# Patient Record
Sex: FEMALE | Race: WHITE | NOT HISPANIC OR LATINO | URBAN - METROPOLITAN AREA
[De-identification: names, ages, dates, MRNs, and addresses within clinical notes are randomized per-mention and may not be internally consistent; named-entity substitution may affect disease eponyms.]

---

## 2017-03-06 ENCOUNTER — OFFICE (AMBULATORY)
Dept: URBAN - METROPOLITAN AREA CLINIC 64 | Facility: CLINIC | Age: 77
End: 2017-03-06

## 2017-03-06 VITALS
HEART RATE: 57 BPM | WEIGHT: 145 LBS | DIASTOLIC BLOOD PRESSURE: 84 MMHG | SYSTOLIC BLOOD PRESSURE: 153 MMHG | HEIGHT: 61 IN

## 2017-03-06 DIAGNOSIS — K21.9 GASTRO-ESOPHAGEAL REFLUX DISEASE WITHOUT ESOPHAGITIS: ICD-10-CM

## 2017-03-06 DIAGNOSIS — F45.8 OTHER SOMATOFORM DISORDERS: ICD-10-CM

## 2017-03-06 PROCEDURE — 99213 OFFICE O/P EST LOW 20 MIN: CPT | Performed by: INTERNAL MEDICINE

## 2017-03-06 RX ORDER — SUCRALFATE 1 G/1
TABLET ORAL
Qty: 90 | Refills: 1 | Status: COMPLETED
End: 2017-03-06

## 2017-03-21 ENCOUNTER — ON CAMPUS - OUTPATIENT (AMBULATORY)
Dept: URBAN - METROPOLITAN AREA HOSPITAL 2 | Facility: HOSPITAL | Age: 77
End: 2017-03-21

## 2017-03-21 VITALS
RESPIRATION RATE: 16 BRPM | DIASTOLIC BLOOD PRESSURE: 59 MMHG | DIASTOLIC BLOOD PRESSURE: 48 MMHG | HEART RATE: 65 BPM | OXYGEN SATURATION: 95 % | DIASTOLIC BLOOD PRESSURE: 60 MMHG | WEIGHT: 138 LBS | TEMPERATURE: 98.3 F | DIASTOLIC BLOOD PRESSURE: 54 MMHG | OXYGEN SATURATION: 94 % | OXYGEN SATURATION: 99 % | OXYGEN SATURATION: 100 % | RESPIRATION RATE: 18 BRPM | HEART RATE: 49 BPM | HEART RATE: 61 BPM | SYSTOLIC BLOOD PRESSURE: 125 MMHG | OXYGEN SATURATION: 97 % | SYSTOLIC BLOOD PRESSURE: 140 MMHG | DIASTOLIC BLOOD PRESSURE: 79 MMHG | SYSTOLIC BLOOD PRESSURE: 122 MMHG | HEART RATE: 58 BPM | SYSTOLIC BLOOD PRESSURE: 139 MMHG | HEIGHT: 59 IN | SYSTOLIC BLOOD PRESSURE: 130 MMHG | DIASTOLIC BLOOD PRESSURE: 63 MMHG | SYSTOLIC BLOOD PRESSURE: 120 MMHG

## 2017-03-21 DIAGNOSIS — F45.8 OTHER SOMATOFORM DISORDERS: ICD-10-CM

## 2017-03-21 DIAGNOSIS — K22.2 ESOPHAGEAL OBSTRUCTION: ICD-10-CM

## 2017-03-21 DIAGNOSIS — K44.9 DIAPHRAGMATIC HERNIA WITHOUT OBSTRUCTION OR GANGRENE: ICD-10-CM

## 2017-03-21 DIAGNOSIS — K31.89 OTHER DISEASES OF STOMACH AND DUODENUM: ICD-10-CM

## 2017-03-21 DIAGNOSIS — K20.9 ESOPHAGITIS, UNSPECIFIED: ICD-10-CM

## 2017-03-21 DIAGNOSIS — K21.9 GASTRO-ESOPHAGEAL REFLUX DISEASE WITHOUT ESOPHAGITIS: ICD-10-CM

## 2017-03-21 PROCEDURE — 43249 ESOPH EGD DILATION <30 MM: CPT | Performed by: INTERNAL MEDICINE

## 2017-03-21 RX ORDER — PANTOPRAZOLE SODIUM 40 MG/1
TABLET, DELAYED RELEASE ORAL
Qty: 30 | Refills: 11 | Status: COMPLETED
End: 2017-09-18

## 2017-03-21 RX ADMIN — PROPOFOL: 10 INJECTION, EMULSION INTRAVENOUS at 13:02

## 2017-03-23 ENCOUNTER — HOSPITAL ENCOUNTER (OUTPATIENT)
Dept: GENERAL RADIOLOGY | Facility: HOSPITAL | Age: 77
Discharge: HOME | End: 2017-03-23
Attending: INTERNAL MEDICINE | Admitting: INTERNAL MEDICINE

## 2017-03-27 ENCOUNTER — HOSPITAL ENCOUNTER (OUTPATIENT)
Dept: GENERAL RADIOLOGY | Facility: HOSPITAL | Age: 77
Discharge: HOME | End: 2017-03-27
Attending: INTERNAL MEDICINE | Admitting: INTERNAL MEDICINE

## 2017-05-02 ENCOUNTER — HOSPITAL ENCOUNTER (OUTPATIENT)
Dept: GENERAL RADIOLOGY | Facility: HOSPITAL | Age: 77
Discharge: HOME | End: 2017-05-02
Attending: INTERNAL MEDICINE | Admitting: INTERNAL MEDICINE

## 2017-06-12 ENCOUNTER — OFFICE (AMBULATORY)
Dept: URBAN - METROPOLITAN AREA CLINIC 64 | Facility: CLINIC | Age: 77
End: 2017-06-12

## 2017-06-12 VITALS
DIASTOLIC BLOOD PRESSURE: 81 MMHG | HEIGHT: 59 IN | SYSTOLIC BLOOD PRESSURE: 155 MMHG | HEART RATE: 56 BPM | WEIGHT: 145 LBS

## 2017-06-12 DIAGNOSIS — K44.9 DIAPHRAGMATIC HERNIA WITHOUT OBSTRUCTION OR GANGRENE: ICD-10-CM

## 2017-06-12 DIAGNOSIS — K59.00 CONSTIPATION, UNSPECIFIED: ICD-10-CM

## 2017-06-12 PROCEDURE — 99214 OFFICE O/P EST MOD 30 MIN: CPT | Performed by: INTERNAL MEDICINE

## 2017-07-25 ENCOUNTER — OFFICE (AMBULATORY)
Dept: URBAN - METROPOLITAN AREA CLINIC 64 | Facility: CLINIC | Age: 77
End: 2017-07-25

## 2017-07-25 VITALS
WEIGHT: 138 LBS | HEIGHT: 59 IN | SYSTOLIC BLOOD PRESSURE: 151 MMHG | HEART RATE: 59 BPM | DIASTOLIC BLOOD PRESSURE: 77 MMHG

## 2017-07-25 DIAGNOSIS — K31.84 GASTROPARESIS: ICD-10-CM

## 2017-07-25 DIAGNOSIS — K22.0 ACHALASIA OF CARDIA: ICD-10-CM

## 2017-07-25 PROCEDURE — 99213 OFFICE O/P EST LOW 20 MIN: CPT | Performed by: NURSE PRACTITIONER

## 2017-08-17 ENCOUNTER — OFFICE VISIT (OUTPATIENT)
Dept: GASTROENTEROLOGY | Facility: CLINIC | Age: 77
End: 2017-08-17

## 2017-08-17 VITALS
BODY MASS INDEX: 27.5 KG/M2 | WEIGHT: 136.4 LBS | HEIGHT: 59 IN | DIASTOLIC BLOOD PRESSURE: 80 MMHG | SYSTOLIC BLOOD PRESSURE: 126 MMHG | TEMPERATURE: 98.7 F

## 2017-08-17 DIAGNOSIS — R68.81 EARLY SATIETY: ICD-10-CM

## 2017-08-17 DIAGNOSIS — R10.13 DYSPEPSIA: Primary | ICD-10-CM

## 2017-08-17 PROCEDURE — 99203 OFFICE O/P NEW LOW 30 MIN: CPT | Performed by: INTERNAL MEDICINE

## 2017-08-17 RX ORDER — AMLODIPINE BESYLATE 5 MG/1
TABLET ORAL
Refills: 10 | COMMUNITY
Start: 2017-08-15

## 2017-08-17 RX ORDER — ESCITALOPRAM OXALATE 10 MG/1
TABLET ORAL
Refills: 3 | COMMUNITY
Start: 2017-08-03 | End: 2017-08-31 | Stop reason: DRUGHIGH

## 2017-08-17 RX ORDER — BRIMONIDINE TARTRATE AND TIMOLOL MALEATE 2; 5 MG/ML; MG/ML
SOLUTION OPHTHALMIC EVERY 12 HOURS
COMMUNITY
End: 2021-05-12 | Stop reason: SDUPTHER

## 2017-08-17 RX ORDER — LEVOTHYROXINE SODIUM 100 MCG
TABLET ORAL
COMMUNITY
Start: 2017-05-19 | End: 2017-08-31 | Stop reason: DRUGHIGH

## 2017-08-17 RX ORDER — CHLORAL HYDRATE 500 MG
1000 CAPSULE ORAL
COMMUNITY
End: 2021-05-12 | Stop reason: HOSPADM

## 2017-08-17 NOTE — PROGRESS NOTES
Chief Complaint   Patient presents with   • Hiatal Hernia   • Difficulty Swallowing     Anitra Nobles is a 76 y.o. female who presents with a History of GERD large hiatal hernia and dysphagia   HPI     Patient 76-year-old female with history of hypertension hypothyroid and GERD presenting for evaluation and second opinion concerning her issues.  Patient with early satiety and full feeling as well as occasional sensation of food in the esophagus.  Patient is oriented undergone EGD colonoscopy as well as esophageal manometry revealing a large hiatal hernia with a third of her stomach in the chest cavity as well as poor distal esophageal motility.  Apparently manometry performed to rule out achalasia and as far as patient knows no achalasia was seen.  Paperwork brought did not include the manometry so we will attempt to get the results when available to confirm no achalasia.  Patient reports significant early satiety and dyspepsia as well as occasional globus sensation.  Patient here for second opinion is told no further intervention may be indicated and has been hearing from other people concerning possible side effects of repair of the hiatal hernia.  Patient is scheduled to follow up with original gastroenterologist in [place] next month.    Past Medical History:   Diagnosis Date   • Anemia    • Anxiety    • Arthritis    • GERD (gastroesophageal reflux disease)    • Hypertension    • Raynauds disease    • Thyroid disorder        Current Outpatient Prescriptions:   •  amLODIPine (NORVASC) 5 MG tablet, TK 1 T PO QD, Disp: , Rfl: 10  •  Ascorbic Acid (VITAMIN C IMMUNE HEALTH PO), Take  by mouth., Disp: , Rfl:   •  brimonidine-timolol (COMBIGAN) 0.2-0.5 % ophthalmic solution, Every 12 (Twelve) Hours., Disp: , Rfl:   •  Calcium Citrate-Vitamin D (CALCIUM + D PO), Take  by mouth., Disp: , Rfl:   •  escitalopram (LEXAPRO) 10 MG tablet, TK 1 T PO QD, Disp: , Rfl: 3  •  esomeprazole (nexIUM) 20 MG capsule, Take 20 mg by  mouth Every Morning Before Breakfast., Disp: , Rfl:   •  Glucosamine-Chondroitin (GLUCOSAMINE CHONDR COMPLEX PO), Take  by mouth., Disp: , Rfl:   •  Multiple Vitamin (MULTI VITAMIN DAILY PO), Take  by mouth., Disp: , Rfl:   •  Omega-3 Fatty Acids (FISH OIL) 1000 MG capsule capsule, Take  by mouth Daily With Breakfast., Disp: , Rfl:   •  SYNTHROID 100 MCG tablet, , Disp: , Rfl:   •  Tetrahydrozoline HCl (EYE DROPS OP), Apply  to eye., Disp: , Rfl:   No Known Allergies  Social History     Social History   • Marital status:      Spouse name: N/A   • Number of children: N/A   • Years of education: N/A     Occupational History   • Not on file.     Social History Main Topics   • Smoking status: Not on file   • Smokeless tobacco: Not on file   • Alcohol use Not on file   • Drug use: Not on file   • Sexual activity: Not on file     Other Topics Concern   • Not on file     Social History Narrative   • No narrative on file     No family history on file.  Review of Systems   Constitutional: Negative.    HENT: Positive for trouble swallowing. Negative for sore throat and voice change.    Eyes: Negative.    Respiratory: Negative.    Cardiovascular: Negative.    Gastrointestinal: Negative.    Endocrine: Negative.    Skin: Negative.    Hematological: Negative.      Vitals:    08/17/17 1529   BP: 126/80   Temp: 98.7 °F (37.1 °C)     Physical Exam   Constitutional: She is oriented to person, place, and time. She appears well-developed and well-nourished.   HENT:   Head: Normocephalic and atraumatic.   Eyes: Pupils are equal, round, and reactive to light. No scleral icterus.   Cardiovascular: Normal rate, regular rhythm and normal heart sounds.  Exam reveals no gallop and no friction rub.    No murmur heard.  Pulmonary/Chest: Effort normal and breath sounds normal. She has no wheezes. She has no rales.   Abdominal: Soft. Bowel sounds are normal. She exhibits no shifting dullness, no distension, no pulsatile liver, no fluid  wave, no abdominal bruit, no ascites, no pulsatile midline mass and no mass. There is no hepatosplenomegaly. There is no tenderness. There is no rigidity and no guarding. No hernia.   Musculoskeletal: Normal range of motion.   Neurological: She is alert and oriented to person, place, and time.   Skin: Skin is warm and dry. No rash noted.   Psychiatric: She has a normal mood and affect. Her behavior is normal. Thought content normal.   Nursing note and vitals reviewed.    Diagnoses and all orders for this visit:    Dyspepsia  -     Ambulatory Referral to General Surgery    Early satiety  -     Ambulatory Referral to General Surgery    Other orders  -     amLODIPine (NORVASC) 5 MG tablet; TK 1 T PO QD  -     escitalopram (LEXAPRO) 10 MG tablet; TK 1 T PO QD  -     SYNTHROID 100 MCG tablet;   -     esomeprazole (nexIUM) 20 MG capsule; Take 20 mg by mouth Every Morning Before Breakfast.  -     Multiple Vitamin (MULTI VITAMIN DAILY PO); Take  by mouth.  -     Omega-3 Fatty Acids (FISH OIL) 1000 MG capsule capsule; Take  by mouth Daily With Breakfast.  -     Glucosamine-Chondroitin (GLUCOSAMINE CHONDR COMPLEX PO); Take  by mouth.  -     Calcium Citrate-Vitamin D (CALCIUM + D PO); Take  by mouth.  -     Ascorbic Acid (VITAMIN C IMMUNE HEALTH PO); Take  by mouth.  -     brimonidine-timolol (COMBIGAN) 0.2-0.5 % ophthalmic solution; Every 12 (Twelve) Hours.  -     Tetrahydrozoline HCl (EYE DROPS OP); Apply  to eye.    Patient 76-year-old female with history of hypertension hypothyroid GERD presenting with early satiety and dyspepsia.  Patient status post EGD colonoscopy esophageal manometry revealing large hiatal hernia with a third of the stomach in the chest with poor distal esophageal motility per report.  Unfortunately manometry results not currently available and packet she brought to us but will get the results when available.  View of above findings with patient with symptomatic early satiety and pain along with globus  sensation would recommend patient have hiatal hernia repair with toupee repair this will both improve esophageal emptying by straightening the esophagus as well as bring the stomach back into the abdominal cavity.  Modified repair due to poor distal motility will prevent esophageal impaction.  We'll refer to Dr. Hassan for further evaluation and discussion of the repair.

## 2017-08-31 ENCOUNTER — OFFICE VISIT (OUTPATIENT)
Dept: SURGERY | Facility: CLINIC | Age: 77
End: 2017-08-31

## 2017-08-31 VITALS — WEIGHT: 137.8 LBS | HEIGHT: 59 IN | OXYGEN SATURATION: 95 % | BODY MASS INDEX: 27.78 KG/M2 | HEART RATE: 58 BPM

## 2017-08-31 DIAGNOSIS — K44.9 PARAESOPHAGEAL HERNIA: Primary | ICD-10-CM

## 2017-08-31 PROCEDURE — 99204 OFFICE O/P NEW MOD 45 MIN: CPT | Performed by: SURGERY

## 2017-08-31 RX ORDER — LEVOTHYROXINE SODIUM 75 MCG
75 TABLET ORAL DAILY
COMMUNITY
Start: 2017-08-17

## 2017-08-31 RX ORDER — ESCITALOPRAM OXALATE 20 MG/1
20 TABLET ORAL DAILY
COMMUNITY

## 2017-09-02 NOTE — PROGRESS NOTES
SUMMARY (A/P):    76-year-old lady with principal symptom of early satiety and finding of large paraesophageal hiatal hernia on barium swallow and EGD.  I am not certain that she would really benefit in any meaningful way from repair of this paraesophageal hernia.  I did describe to her and her daughter the nature of the procedure and the risks including but not limited to bleeding, infection, conversion to open procedure, postoperative dysphagia, and the very real risk of recurrence.  I have requested copies of her manometry and gastric emptying scan which she did not have with her today.  I have also requested a disc with images from her upper GI and barium swallow from [facility].  Addendum will be issued after I had an opportunity to review these studies.    Addendum (9/14/17):  -Gastric emptying scan 6/30/2017 mildly prolonged gastric emptying  -Esophageal manometry 6/27/2017 indicates lower esophageal sphincter pressure of 20.5 mmHg, residual minimal pressure 7.7 mmHg.  Distal esophageal pressures 0 mmHg.  Effective esophageal peristalsis 0%.  This information is from the raw data, actual interpretation was not included with the report and I am requesting this.  -I reviewed the images from her upper GI and esophagram and her esophagus does appear distended but certainly there is no bird's beak appearance suggestive of achalasia.    Additional addendum will be issued once I get the actual manometry interpretation.    Addendum (9/14/17): obtained report which demonstrates normotensive lower esophageal sphincter with adequate relaxation. Poor esophageal body peristalsis.    Spoke with her at length regarding all results and feel she does not have a strong indication to proceed with repair.      CC:  Paraesophageal hernia    HPI:  76-year-old lady reports mild gastroesophageal reflux disease symptoms that are essentially completely relieved with over-the-counter dose of Nexium daily.  She also reports associated early  satiety since January of this year.  She has had some unexpected weight loss.  No abdominal pain, no chest pain, no dysphagia.    PHYSICAL EXAM:   Constitutional: Well-developed well-nourished, no acute distress   Heart rate 58   Weight (pounds) 137   BMI 27.8   Height (inches) 59  Eyes: Conjunctiva normal, sclera nonicteric  ENMT: Hearing grossly normal, oral mucosa moist  Neck: Supple, no palpable mass, normal thyroid, trachea midline  Respiratory: Clear to auscultation, normal inspiratory effort  Cardiovascular: Regular rate, no murmur, no carotid bruit, no peripheral edema, no jugular venous distention  Gastrointestinal: Soft, nontender, no palpable mass, no hepatosplenomegaly, negative for hernia, bowel sounds normal  Lymphatics (palpable nodes):  cervical-negative, axillary-negative  Skin:  Warm, dry, no rash on visualized skin surfaces  Musculoskeletal: Symmetric strength, normal gait  Psychiatric: Alert and oriented ×3, normal affect     ALLERGIES: reviewed, in Epic    MEDICATIONS: reviewed, in Epic    PMH:    Hypertension  Hypothyroidism  Gastroesophageal reflux disease  Arthritis  Raynaud's disease  Depression    PSH:    Thyroidectomy 2002  Cataract 2008  Breast biopsy, left, 2012  Colonoscopy 2014  Appendectomy 1985    FAMILY HISTORY:    Brother with colon cancer  Sr. with ovarian cancer    SOCIAL HISTORY:   Quit tobacco use in 1979  Denies alcohol use    ROS:  No chest pain or shortness of air.  All other systems reviewed and negative other than presenting complaints.    RADIOLOGY/ENDOSCOPY:    -EGD 3/21/2017 Dr. Huerta at [place]: Sukhdeep erosions, GE junction stricture which was dilated, large hiatal hernia.  -Barium swallow 3/23/17 [place]: Moderately distended distal esophagus with 16 cm hiatal hernia and free reflux.    HESHAM LOCO M.D.

## 2017-09-12 ENCOUNTER — TELEPHONE (OUTPATIENT)
Dept: SURGERY | Facility: CLINIC | Age: 77
End: 2017-09-12

## 2017-09-12 NOTE — TELEPHONE ENCOUNTER
Pt would like to know if you have had a chance to review upper GI and barium swallow images from  [facility]?

## 2017-09-14 NOTE — TELEPHONE ENCOUNTER
Please let her know that I have reviewed everything but the manometry report did not include a physician interpretation. I have requested this and am waiting for this report.

## 2017-09-18 ENCOUNTER — OFFICE (AMBULATORY)
Dept: URBAN - METROPOLITAN AREA CLINIC 64 | Facility: CLINIC | Age: 77
End: 2017-09-18

## 2017-09-18 VITALS
SYSTOLIC BLOOD PRESSURE: 135 MMHG | HEIGHT: 59 IN | HEART RATE: 70 BPM | DIASTOLIC BLOOD PRESSURE: 78 MMHG | WEIGHT: 141 LBS

## 2017-09-18 DIAGNOSIS — K22.0 ACHALASIA OF CARDIA: ICD-10-CM

## 2017-09-18 DIAGNOSIS — K31.84 GASTROPARESIS: ICD-10-CM

## 2017-09-18 DIAGNOSIS — K44.9 DIAPHRAGMATIC HERNIA WITHOUT OBSTRUCTION OR GANGRENE: ICD-10-CM

## 2017-09-18 DIAGNOSIS — K21.9 GASTRO-ESOPHAGEAL REFLUX DISEASE WITHOUT ESOPHAGITIS: ICD-10-CM

## 2017-09-18 PROCEDURE — 99213 OFFICE O/P EST LOW 20 MIN: CPT | Performed by: INTERNAL MEDICINE

## 2018-02-12 ENCOUNTER — HOSPITAL ENCOUNTER (OUTPATIENT)
Dept: CARDIOLOGY | Facility: HOSPITAL | Age: 78
Discharge: HOME | End: 2018-02-12
Attending: FAMILY MEDICINE | Admitting: FAMILY MEDICINE

## 2018-03-07 ENCOUNTER — HOSPITAL ENCOUNTER (OUTPATIENT)
Dept: RESPIRATORY THERAPY | Facility: HOSPITAL | Age: 78
Discharge: HOME | End: 2018-03-07
Attending: INTERNAL MEDICINE | Admitting: INTERNAL MEDICINE

## 2018-03-20 ENCOUNTER — HOSPITAL ENCOUNTER (OUTPATIENT)
Dept: OTHER | Facility: HOSPITAL | Age: 78
Discharge: HOME | End: 2018-03-20
Attending: INTERNAL MEDICINE | Admitting: INTERNAL MEDICINE

## 2018-08-20 ENCOUNTER — OFFICE (AMBULATORY)
Dept: URBAN - METROPOLITAN AREA CLINIC 64 | Facility: CLINIC | Age: 78
End: 2018-08-20

## 2018-08-20 VITALS
HEIGHT: 59 IN | HEART RATE: 74 BPM | SYSTOLIC BLOOD PRESSURE: 138 MMHG | DIASTOLIC BLOOD PRESSURE: 76 MMHG | WEIGHT: 150 LBS

## 2018-08-20 DIAGNOSIS — K62.3 RECTAL PROLAPSE: ICD-10-CM

## 2018-08-20 PROCEDURE — 99213 OFFICE O/P EST LOW 20 MIN: CPT | Performed by: NURSE PRACTITIONER

## 2019-04-09 ENCOUNTER — HOSPITAL ENCOUNTER (OUTPATIENT)
Dept: CARDIOLOGY | Facility: HOSPITAL | Age: 79
Discharge: HOME | End: 2019-04-09

## 2019-10-17 ENCOUNTER — OFFICE VISIT (OUTPATIENT)
Dept: CARDIOLOGY | Facility: CLINIC | Age: 79
End: 2019-10-17

## 2019-10-17 VITALS
HEIGHT: 59 IN | DIASTOLIC BLOOD PRESSURE: 74 MMHG | OXYGEN SATURATION: 97 % | SYSTOLIC BLOOD PRESSURE: 127 MMHG | WEIGHT: 134 LBS | HEART RATE: 68 BPM | BODY MASS INDEX: 27.01 KG/M2

## 2019-10-17 DIAGNOSIS — I34.0 NONRHEUMATIC MITRAL VALVE REGURGITATION: Primary | ICD-10-CM

## 2019-10-17 DIAGNOSIS — E03.9 HYPOTHYROIDISM (ACQUIRED): ICD-10-CM

## 2019-10-17 PROCEDURE — 93000 ELECTROCARDIOGRAM COMPLETE: CPT | Performed by: INTERNAL MEDICINE

## 2019-10-17 PROCEDURE — 99213 OFFICE O/P EST LOW 20 MIN: CPT | Performed by: INTERNAL MEDICINE

## 2019-10-17 RX ORDER — LATANOPROST 50 UG/ML
SOLUTION/ DROPS OPHTHALMIC
COMMUNITY
Start: 2012-12-13

## 2019-10-17 RX ORDER — FERROUS SULFATE 325(65) MG
325 TABLET ORAL
COMMUNITY
End: 2021-05-12 | Stop reason: HOSPADM

## 2019-10-17 RX ORDER — BRIMONIDINE TARTRATE AND TIMOLOL MALEATE 2; 5 MG/ML; MG/ML
SOLUTION OPHTHALMIC
COMMUNITY
Start: 2018-03-19

## 2019-10-17 NOTE — PROGRESS NOTES
Encounter Date:10/17/2019  Last seen 4/15/2019      Patient ID: Anitra Nobles is a 79 y.o. female.    Chief Complaint:  Mitral regurgitation  History of shortness of breath      History of Present Illness    Since I have last seen, the patient has been without any chest discomfort ,shortness of breath, palpitations, dizziness or syncope.  Denies having any headache ,abdominal pain ,nausea, vomiting , diarrhea constipation, loss of weight or loss of appetite.  Denies having any excessive bruising ,hematuria or blood in the stool.    Review of all systems negative except as indicated  Assessment and Plan         /////////////////////////////    Impression  ====================   -Shortness of breath-better    - history of 2 to 3+ mitral regurgitation.    Cardiac catheterization 03/23/2018 revealed normal coronary arteries.  No evidence for pulmonary hypertension.  Two to 3+ mitral regurgitation normal left ventricular function.    Fantasma 03/23/2018 revealed left atrial enlargement moderate mitral and tricuspid regurgitation mild aortic valve stenosis and pulmonary artery pressure of 50 mm of mercury.    Echocardiogram 02/12/2018 showed normal left ventricular function significant pulmonary hypertension with pulmonary artery pressure of 60 mm of mercury.  Mild aortic valve stenosis.  Left atrial enlargement.    -anemia    -anxiety depression    -History of esophageal stricture.    - status post partial thyroidectomy ganglionectomy and appendectomy    -former smoker    -hypothyroidism    -family history of coronary artery disease    - history of Raynaud's phenomena  ====================   Plan  ================  Patient is not having any angina pectoris or congestive heart failure.    EKG showed sinus rhythm without any ischemic changes  Medications were reviewed and updated.  Followup in the office in 6 months.  //////////////////////////////////////////       Diagnosis Plan   1. Nonrheumatic mitral valve  regurgitation  ECG 12 Lead   2. Hypothyroidism (acquired)     LAB RESULTS (LAST 7 DAYS)    CBC        BMP        CMP         BNP        TROPONIN        CoAg        Creatinine Clearance  CrCl cannot be calculated (Patient's most recent lab result is older than the maximum 30 days allowed.).    ABG        Radiology  No radiology results for the last day                The following portions of the patient's history were reviewed and updated as appropriate: allergies, current medications, past family history, past medical history, past social history, past surgical history and problem list.    Review of Systems   Constitution: Negative for chills, fever and malaise/fatigue.   Cardiovascular: Negative for chest pain, dyspnea on exertion, leg swelling and palpitations.   Respiratory: Negative for shortness of breath.    Skin: Negative for rash.   Neurological: Negative for dizziness, light-headedness and numbness.         Current Outpatient Medications:   •  amLODIPine (NORVASC) 5 MG tablet, TK 1 T PO QD, Disp: , Rfl: 10  •  Ascorbic Acid (VITAMIN C IMMUNE HEALTH PO), Take 1,000 mg by mouth Daily., Disp: , Rfl:   •  brimonidine-timolol (COMBIGAN) 0.2-0.5 % ophthalmic solution, Every 12 (Twelve) Hours., Disp: , Rfl:   •  brimonidine-timolol (COMBIGAN) 0.2-0.5 % ophthalmic solution, COMBIGORN, Disp: , Rfl:   •  Calcium Citrate-Vitamin D (CALCIUM + D PO), Take 1,200 mg by mouth Daily., Disp: , Rfl:   •  CRANBERRY PO, Take 1 tablet by mouth Daily., Disp: , Rfl:   •  escitalopram (LEXAPRO) 20 MG tablet, Take 20 mg by mouth Daily., Disp: , Rfl:   •  esomeprazole (nexIUM) 20 MG capsule, Take 20 mg by mouth Every Morning Before Breakfast., Disp: , Rfl:   •  ferrous sulfate 325 (65 FE) MG tablet, Take 325 mg by mouth Daily With Breakfast., Disp: , Rfl:   •  Glucosamine-Chondroitin (GLUCOSAMINE CHONDR COMPLEX PO), Take 2 tablets by mouth Daily., Disp: , Rfl:   •  latanoprost (XALATAN) 0.005 % ophthalmic solution, LATANOPROST 0.005  % SOLN, Disp: , Rfl:   •  Multiple Vitamin (MULTI VITAMIN DAILY PO), Take 1 tablet by mouth Daily., Disp: , Rfl:   •  Omega-3 Fatty Acids (FISH OIL) 1000 MG capsule capsule, Take 1,000 mg by mouth Daily With Breakfast., Disp: , Rfl:   •  SYNTHROID 75 MCG tablet, Take 75 mcg by mouth Daily., Disp: , Rfl:   •  Tetrahydrozoline HCl (EYE DROPS OP), Apply 1 drop to eye Daily., Disp: , Rfl:     No Known Allergies    Family History   Problem Relation Age of Onset   • Heart disease Father    • Ovarian cancer Sister    • Colon polyps Sister    • Colon cancer Brother    • Colon polyps Brother        Past Surgical History:   Procedure Laterality Date   • APPENDECTOMY N/A     Dr. Adame   • BLADDER REPAIR  2019   • BREAST BIOPSY Left     BENIGN   • CATARACT EXTRACTION      Dr. Shaffer   • COLONOSCOPY N/A    • RECTAL PROLAPSE REPAIR  2019   • THYROIDECTOMY, PARTIAL     • UPPER GASTROINTESTINAL ENDOSCOPY N/A     Dr. Huerta       Past Medical History:   Diagnosis Date   • Anemia    • Anxiety    • Arthritis    • Depression    • GERD (gastroesophageal reflux disease)    • Hypertension    • Raynauds disease    • Thyroid disorder        Family History   Problem Relation Age of Onset   • Heart disease Father    • Ovarian cancer Sister    • Colon polyps Sister    • Colon cancer Brother    • Colon polyps Brother        Social History     Socioeconomic History   • Marital status:      Spouse name: Not on file   • Number of children: Not on file   • Years of education: Not on file   • Highest education level: Not on file   Tobacco Use   • Smoking status: Former Smoker     Last attempt to quit: 1979     Years since quittin.8   • Smokeless tobacco: Never Used   Substance and Sexual Activity   • Alcohol use: No   • Sexual activity: Defer           ECG 12 Lead  Date/Time: 10/17/2019 1:57 PM  Performed by: Brian Malone MD  Authorized by: Brian Malone MD   Comparison: compared with previous ECG  "  Comments: Normal sinus rhythm nonspecific ST-T wave changes left anterior fascicular block 60/min no ectopy normal intervals.  No change from 4/15/2019              Objective:       Physical Exam    /74 (BP Location: Left arm, Patient Position: Sitting, Cuff Size: Adult)   Pulse 68   Ht 149.9 cm (59\")   Wt 60.8 kg (134 lb)   SpO2 97%   BMI 27.06 kg/m²   The patient is alert, oriented and in no distress.    Vital signs as noted above.    Head and neck revealed no carotid bruits or jugular venous distension.  No thyromegaly or lymphadenopathy is present.    Lungs clear.  No wheezing.  Breath sounds are normal bilaterally.    Heart normal first and second heart sounds.  No murmur..  No pericardial rub is present.  No gallop is present.    Abdomen soft and nontender.  No organomegaly is present.    Extremities revealed good peripheral pulses without any pedal edema.    Skin warm and dry.    Musculoskeletal system is grossly normal except for kyphoscoliosis.    CNS grossly normal.        "

## 2021-04-06 ENCOUNTER — OFFICE VISIT (OUTPATIENT)
Dept: CARDIOLOGY | Facility: CLINIC | Age: 81
End: 2021-04-06

## 2021-04-06 VITALS
OXYGEN SATURATION: 90 % | TEMPERATURE: 97.7 F | BODY MASS INDEX: 27.17 KG/M2 | SYSTOLIC BLOOD PRESSURE: 145 MMHG | WEIGHT: 134.5 LBS | HEART RATE: 70 BPM | DIASTOLIC BLOOD PRESSURE: 74 MMHG

## 2021-04-06 DIAGNOSIS — I34.0 NONRHEUMATIC MITRAL VALVE REGURGITATION: Primary | ICD-10-CM

## 2021-04-06 DIAGNOSIS — R06.02 SHORTNESS OF BREATH: ICD-10-CM

## 2021-04-06 PROCEDURE — 99214 OFFICE O/P EST MOD 30 MIN: CPT | Performed by: INTERNAL MEDICINE

## 2021-04-06 PROCEDURE — 93000 ELECTROCARDIOGRAM COMPLETE: CPT | Performed by: INTERNAL MEDICINE

## 2021-04-06 NOTE — PROGRESS NOTES
Encounter Date:04/06/2021  Last seen 10/17/2019    Patient ID: Anitra Nobles is a 80 y.o. female.    Chief Complaint:  Mitral regurgitation  History of shortness of breath  Hypothyroidism     History of Present Illness     Since I have last seen, the patient has been without any chest discomfort ,shortness of breath, palpitations, dizziness or syncope.  Denies having any headache ,abdominal pain ,nausea, vomiting , diarrhea constipation, loss of weight or loss of appetite.  Denies having any excessive bruising ,hematuria or blood in the stool.    Review of all systems negative except as indicated.    Reviewed ROS.    Assessment and Plan            /////////////////////////////    Impression  ====================   -Shortness of breath-better     - history of 2 to 3+ mitral regurgitation.     Cardiac catheterization 03/23/2018 revealed normal coronary arteries.  No evidence for pulmonary hypertension.  Two to 3+ mitral regurgitation normal left ventricular function.     Fantasma 03/23/2018 revealed left atrial enlargement moderate mitral and tricuspid regurgitation mild aortic valve stenosis and pulmonary artery pressure of 50 mm of mercury.     Echocardiogram 02/12/2018 showed normal left ventricular function significant pulmonary hypertension with pulmonary artery pressure of 60 mm of mercury.  Mild aortic valve stenosis.  Left atrial enlargement.     -anemia     -anxiety depression     -History of esophageal stricture.     - status post partial thyroidectomy ganglionectomy and appendectomy     -former smoker     -hypothyroidism     -family history of coronary artery disease     - history of Raynaud's phenomena  ====================   Plan  ================  Shortness of breath-better.  Patient has pulmonary consultation through [facility] pulmonary department.    Mitral regurgitation  Patient is not having any angina pectoris or congestive heart failure.    EKG showed sinus rhythm without any ischemic  changes.    Hypothyroidism-continue levothyroxine    Medications were reviewed and updated.  Followup in the office in 6 months with an echocardiogram.  Further plan will depend on patient's progress.  //////////////////////////////////////////               Diagnosis Plan   1. Nonrheumatic mitral valve regurgitation  ECG 12 Lead    Adult Transthoracic Echo Complete W/ Cont if Necessary Per Protocol   2. Shortness of breath  ECG 12 Lead    Adult Transthoracic Echo Complete W/ Cont if Necessary Per Protocol   LAB RESULTS (LAST 7 DAYS)    CBC        BMP        CMP         BNP        TROPONIN        CoAg        Creatinine Clearance  CrCl cannot be calculated (Patient's most recent lab result is older than the maximum 30 days allowed.).    ABG        Radiology  No radiology results for the last day                The following portions of the patient's history were reviewed and updated as appropriate: allergies, current medications, past family history, past medical history, past social history, past surgical history and problem list.    Review of Systems   Constitutional: Negative for malaise/fatigue.   Cardiovascular: Negative for chest pain, leg swelling, palpitations and syncope.   Respiratory: Positive for shortness of breath.    Skin: Negative for rash.   Gastrointestinal: Negative for nausea and vomiting.   Neurological: Negative for dizziness, light-headedness and numbness.         Current Outpatient Medications:   •  amLODIPine (NORVASC) 5 MG tablet, TK 1 T PO QD, Disp: , Rfl: 10  •  Ascorbic Acid (VITAMIN C IMMUNE HEALTH PO), Take 1,000 mg by mouth Daily., Disp: , Rfl:   •  brimonidine-timolol (COMBIGAN) 0.2-0.5 % ophthalmic solution, Every 12 (Twelve) Hours., Disp: , Rfl:   •  brimonidine-timolol (COMBIGAN) 0.2-0.5 % ophthalmic solution, COMBIGORN, Disp: , Rfl:   •  Calcium Citrate-Vitamin D (CALCIUM + D PO), Take 1,200 mg by mouth Daily., Disp: , Rfl:   •  CRANBERRY PO, Take 1 tablet by mouth Daily., Disp: ,  Rfl:   •  escitalopram (LEXAPRO) 20 MG tablet, Take 20 mg by mouth Daily., Disp: , Rfl:   •  esomeprazole (nexIUM) 20 MG capsule, Take 20 mg by mouth Every Morning Before Breakfast., Disp: , Rfl:   •  ferrous sulfate 325 (65 FE) MG tablet, Take 325 mg by mouth Daily With Breakfast., Disp: , Rfl:   •  Glucosamine-Chondroitin (GLUCOSAMINE CHONDR COMPLEX PO), Take 2 tablets by mouth Daily., Disp: , Rfl:   •  latanoprost (XALATAN) 0.005 % ophthalmic solution, LATANOPROST 0.005 % SOLN, Disp: , Rfl:   •  Multiple Vitamin (MULTI VITAMIN DAILY PO), Take 1 tablet by mouth Daily., Disp: , Rfl:   •  SYNTHROID 75 MCG tablet, Take 75 mcg by mouth Daily., Disp: , Rfl:   •  Tetrahydrozoline HCl (EYE DROPS OP), Apply 1 drop to eye Daily., Disp: , Rfl:   •  Omega-3 Fatty Acids (FISH OIL) 1000 MG capsule capsule, Take 1,000 mg by mouth Daily With Breakfast., Disp: , Rfl:     No Known Allergies    Family History   Problem Relation Age of Onset   • Heart disease Father    • Ovarian cancer Sister    • Colon polyps Sister    • Colon cancer Brother    • Colon polyps Brother        Past Surgical History:   Procedure Laterality Date   • APPENDECTOMY N/A 1985    Dr. Adame   • BLADDER REPAIR  04/2019   • BREAST BIOPSY Left 2012    BENIGN   • CATARACT EXTRACTION  2008    Dr. Shaffer   • COLONOSCOPY N/A 2014   • RECTAL PROLAPSE REPAIR  04/2019   • THYROIDECTOMY, PARTIAL  2002   • UPPER GASTROINTESTINAL ENDOSCOPY N/A 2017    Dr. Huerta       Past Medical History:   Diagnosis Date   • Anemia    • Anxiety    • Arthritis    • Depression    • GERD (gastroesophageal reflux disease)    • Hypertension    • Raynauds disease    • Thyroid disorder        Family History   Problem Relation Age of Onset   • Heart disease Father    • Ovarian cancer Sister    • Colon polyps Sister    • Colon cancer Brother    • Colon polyps Brother        Social History     Socioeconomic History   • Marital status:      Spouse name: Not on file   • Number of children: Not  on file   • Years of education: Not on file   • Highest education level: Not on file   Tobacco Use   • Smoking status: Former Smoker     Quit date: 1979     Years since quittin.2   • Smokeless tobacco: Never Used   Substance and Sexual Activity   • Alcohol use: No   • Sexual activity: Defer           ECG 12 Lead    Date/Time: 2021 1:53 PM  Performed by: Brian Malone MD  Authorized by: Brian Malone MD   Comparison: compared with previous ECG   Similar to previous ECG  Comparison to previous ECG: Normal sinus rhythm left anterior fascicular block 67/min nonspecific ST-T wave changes no ectopy no significant change from 10/17/2019                Objective:       Physical Exam    /74   Pulse 70   Temp 97.7 °F (36.5 °C)   Wt 61 kg (134 lb 8 oz)   SpO2 90%   BMI 27.17 kg/m²   The patient is alert, oriented and in no distress.    Vital signs as noted above.    Head and neck revealed no carotid bruits or jugular venous distension.  No thyromegaly or lymphadenopathy is present.    Lungs clear.  No wheezing.  Breath sounds are normal bilaterally.    Heart normal first and second heart sounds.  Grade 2/6 systolic murmur..  No pericardial rub is present.  No gallop is present.    Abdomen soft and nontender.  No organomegaly is present.    Extremities revealed good peripheral pulses without any pedal edema.    Skin warm and dry.    Musculoskeletal system-kyphoscoliosis    CNS grossly normal.    Reviewed and unchanged from last visit.

## 2021-04-07 ENCOUNTER — TELEPHONE (OUTPATIENT)
Dept: CARDIOLOGY | Facility: CLINIC | Age: 81
End: 2021-04-07

## 2021-04-07 DIAGNOSIS — R06.02 SHORTNESS OF BREATH: Primary | ICD-10-CM

## 2021-05-12 ENCOUNTER — OFFICE VISIT (OUTPATIENT)
Dept: PULMONOLOGY | Facility: HOSPITAL | Age: 81
End: 2021-05-12

## 2021-05-12 VITALS
WEIGHT: 134 LBS | SYSTOLIC BLOOD PRESSURE: 122 MMHG | HEIGHT: 59 IN | BODY MASS INDEX: 27.01 KG/M2 | DIASTOLIC BLOOD PRESSURE: 74 MMHG | TEMPERATURE: 97.5 F | OXYGEN SATURATION: 94 % | RESPIRATION RATE: 16 BRPM | HEART RATE: 58 BPM

## 2021-05-12 DIAGNOSIS — I27.20 PULMONARY HYPERTENSION (HCC): Primary | ICD-10-CM

## 2021-05-12 DIAGNOSIS — E03.9 ACQUIRED HYPOTHYROIDISM: ICD-10-CM

## 2021-05-12 PROBLEM — I35.0 AORTIC STENOSIS: Status: ACTIVE | Noted: 2018-03-19

## 2021-05-12 PROBLEM — K21.9 GASTROESOPHAGEAL REFLUX DISEASE: Status: ACTIVE | Noted: 2021-05-12

## 2021-05-12 PROBLEM — M19.90 OSTEOARTHRITIS: Status: ACTIVE | Noted: 2021-05-12

## 2021-05-12 PROBLEM — Z82.3 FAMILY HISTORY OF CEREBROVASCULAR ACCIDENT (CVA): Status: ACTIVE | Noted: 2021-05-12

## 2021-05-12 PROBLEM — E78.5 HYPERLIPIDEMIA: Status: ACTIVE | Noted: 2021-05-12

## 2021-05-12 PROBLEM — Z83.3 FAMILY HISTORY OF DIABETES MELLITUS: Status: ACTIVE | Noted: 2021-05-12

## 2021-05-12 PROCEDURE — G0463 HOSPITAL OUTPT CLINIC VISIT: HCPCS

## 2021-05-12 NOTE — PROGRESS NOTES
PULMONARY  CONSULT NOTE      PATIENT IDENTIFICATION:  Name: Anitra Nobles  Age: 80 y.o.  Sex: female  :  1940  MRN: [id number]    DATE OF CONSULTATION:  2021                     CHIEF COMPLAINT: Shortness of breath  History of Present Illness:   Anitra Nobles is a 80 y.o. female nice lady who smoked like for 8 years when she is it was in 20s, and she lived around smoker, over the last few years start having some difficulty breathing she was diagnosed with scleroderma and Raynaud's phenomena and later found that the patient has pulmonary hypertension we do not have previous records from [place], and she been followed there and she did not start on any medication,    Currently patient she is walking from one room to the other she is getting short winded, she does not hear any wheezing no significant coughing, no significant change in her weight, no nausea no vomiting    Review of Systems:   Constitutional:  As above   Eyes: negative   ENT/oropharynx: negative   Cardiovascular: negative   Respiratory:  As above   Gastrointestinal: negative   Genitourinary: negative   Neurological: negative   Musculoskeletal: negative   Integument/breast: negative   Endocrine: negative   Allergic/Immunologic: negative     Past Medical History:  Past Medical History:   Diagnosis Date   • Anemia    • Anxiety    • Arthritis    • Depression    • GERD (gastroesophageal reflux disease)    • Hypertension    • Raynauds disease    • Thyroid disorder        Past Surgical History:  Past Surgical History:   Procedure Laterality Date   • APPENDECTOMY N/A     Dr. Adame   • BLADDER REPAIR  2019   • BREAST BIOPSY Left     BENIGN   • CATARACT EXTRACTION      Dr. Shaffer   • COLONOSCOPY N/A    • RECTAL PROLAPSE REPAIR  2019   • THYROIDECTOMY, PARTIAL     • UPPER GASTROINTESTINAL ENDOSCOPY N/A     Dr. Huerta        Family History:  Family History   Problem Relation Age of Onset   • Heart disease Father     • Ovarian cancer Sister    • Colon polyps Sister    • Colon cancer Brother    • Colon polyps Brother         Social History:   Social History     Tobacco Use   • Smoking status: Former Smoker     Quit date: 1979     Years since quittin.3   • Smokeless tobacco: Never Used   Substance Use Topics   • Alcohol use: No        Allergies:  No Known Allergies    Home Meds:  (Not in a hospital admission)      Objective:    Vitals Ranges:   Temp:  [97.5 °F (36.4 °C)] 97.5 °F (36.4 °C)  Heart Rate:  [58] 58  Resp:  [16] 16  BP: (122)/(74) 122/74  Body mass index is 27.06 kg/m².     Exam:  General Appearance:  WDWN    HEENT:   without obvious abnormality,  Conjunctiva/corneas clear,  Normal external ear canals, no drainage    Clear orsalmucosa,  Mallampati score 3    Neck:  Supple, symmetrical, trachea midline. No JVD.  Lungs:   Bilateral basal rhonchi bilaterally, respirations unlabored symmetrical wall movement.    Chest wall:  No tenderness or deformity.    Heart:  Regular rate and rhythm, S1 and S2 normal.  Extremities: Trace edema no clubbing or Cyanosis        Data Review:  All labs (24hrs): No results found for this or any previous visit (from the past 24 hour(s)).     Imaging:  CT Head Without Contrast  DATE OF SERVICE:  2017 6:31 PM    PROCEDURE:  CT HEAD WITHOUT    HISTORY:  Dizziness    COMPARISON:  None.    TECHNIQUE:  Routine transaxial cuts were obtained through the head without the  administration of contrast.    DISCUSSION:  The cerebral sulci, fissures, ventricles, and basal cisterns are prominent  consistent with cerebral atrophy not unusual in a patient of this age.  No  intracranial mass, hemorrhage,or edema is apparent.  The orbital contents are  normal.  The visualized paranasal and mastoid sinuses are clear. No bony  abnormalities are seen.    IMPRESSION:    1. There is mild atrophy however there does not appear to be evidence to  suggest acute edema hemorrhage or gross mass effect.    Sincere  MD Rubi    DS: 07/16/2017 18:44  Report ID: [id number]  cc:  ALLEGRA MEYER (ARI)    FINAL AUTHENTICATED COPY  FL Upper GI Single Contrast Without KUB  DATE OF SERVICE:  5/2/2017 9:02 AM    PROCEDURE:  XR UGI (ROUTINE)    HISTORY:  globus sensation, gastroesophageal reflux disease , history of large hiatal  hernia.    COMPARISON:  Esophagram March 23, 2017.    TECHNIQUE:   radiograph of the abdomen was obtained. Patient subsequently ingested  effervescent crystals followed by high density barium for double contrast  imaging of the upper GI tract.  Patient then ingested medium density barium  for single-contrast imaging.    Fluoroscopic Time: 5 minutes 44 seconds.    DISCUSSION:  A  radiograph of the abdomen and pelvis was obtained.  There is retained  contrast material in the sigmoid colon region.  There is a nonobstructive  bowel gas pattern.  There is S-shaped scoliotic curvature of the thoracolumbar  spine.  Aortic vascular calcification is present.    The upper esophagus is normal caliber.  The distal esophagus is quite dilated,  and demonstrates limited to no peristalsis.  Barium flows freely from the  distal esophagus into a large hiatal hernia containing the proximal 3rd of the  stomach.  The majority of the barium holds up in the distal esophagus and  hiatal hernia, with sluggish passage into the large hiatal hernia, with some  hold up of barium before passing into the remainder of the stomach.  There is  somewhat limited coating of the stomach due to this.  The visualized portions  of the stomach demonstrate a grossly normal mucosal fold pattern.  There is no  evidence of peptic ulcer disease.  There is prominent gastroesophageal reflux  due to the hiatal hernia which occurred frequently and continuously regardless  of patient position.  There is a large multi lobed diverticulum or multiple  overlapping diverticula arising from the 2nd or 3rd portion of the duodenum.    IMPRESSION:    1. There is marked  dilatation of the distal esophagus again present, with  minimal to no peristalsis again demonstrated.  2. A very large hiatal hernia is again noted, as described in detail above.  There is continuous gastroesophageal reflux demonstrated during the exam.  3. Large duodenal diverticula.    Chino Hernandez MD    DS: 05/02/2017 14:39  Report ID: [id number]  cc: CAN MCCORMACK  FINAL AUTHENTICATED COPY  XR Abdomen 1 View  DATE OF SERVICE:  4/3/2017 8:14 AM    PROCEDURE:  KUB AT NO CHARGE    HISTORY:  76-year-old female with hiatal hernia and GERD.    COMPARISON:  Abdominal radiograph dated 03/27/2017.    TECHNIQUE:  Single radiographic view of the abdomen was obtained.    DISCUSSION:  The patient was scheduled for an upper GI series.  The previous study  demonstrated a very large amount of retained barium throughout the colon.  There is significant improvement on today's film but there is still  considerable barium in the region of the transverse colon and splenic flexure  which would likely interfere with upper GI series.  The patient will be  rescheduled for upper GI series.    IMPRESSION:  Upper GI series has again been rescheduled due to residual barium in the colon.    Pradeep Avery MD    DS: 04/03/2017 10:23  Report ID: [id number]  cc: CAN MCCORMACK  FINAL AUTHENTICATED COPY  XR Abdomen 1 View  DATE OF SERVICE:  3/27/2017 8:31 AM    PROCEDURE:  KUB AT NO CHARGE    HISTORY:  Hiatal hernia.    COMPARISON:  None.    TECHNIQUE:  Single radiographic view of the abdomen was obtained.    DISCUSSION:  Patient was scheduled for an upper GI series.  There is a very large amount of  retained barium throughout the colon which would preclude upper GI series at  this time.  Note is made of diverticulosis in the left side of the colon.    IMPRESSION:  The upper GI series was canceled due to large amount of retained barium  throughout the colon.   Physician's office will be notified and the exam will  be rescheduled.    Pradeep Avery MD    DS: 03/27/2017 09:21  Report ID: [id number]  cc: CAN BUCIO ZAYDA  CAN MCCORMACK  FINAL AUTHENTICATED COPY  FL Esophagram Complete  DATE OF SERVICE:  3/23/2017 10:45 AM    PROCEDURE:  XR BA SWALLOW/ESOPHAGUS    HISTORY:  hiatal hernia history of thyroid surgery 25 years ago, history of left breast  biopsy 5 years ago.  Patient feels like there is a lump in throw which fills  up quickly and eating solids quickly is a problem.  Patient states she can eat  breakfast with no problems.    COMPARISON:  None.    TECHNIQUE:  Patient ingested effervescent crystals followed by high density barium for  double contrast imaging of the esophagus. Patient subsequently ingested medium  density barium for single-contrast evaluation.    Fluoroscopic Time: 4.0 minutes    DISCUSSION:  Today's study the patient was able to swallow barium without difficulty a  large hiatal hernia is present along with a distended distal esophagus which  drapes over the hiatal hernia an then spills into the hiatal hernia.  There is  rapid spill from the hiatal hernia into the stomach.    The distal esophagus is distended to over 3-4 cm in diameter there is no  peristaltic activity seen in the entire esophagus during today's study.  The  barium fills the distended distal esophagus and after the that spaces filled  there is spill into the hiatal hernia which measures over 16 cm in diameter  and from the hiatal hernia there is spill into the stomach.  When the patient  was given a barium tablet the tablet would fall into the distal dilated  esophagus draped over the hiatal hernia and despite golfing multiple golfs of  water the tablet would not spill into the more distal esophagus and into the  hiatal hernia.  No obstruction was seen radiographically at this level or from  the hiatal hernia into the intra-abdominal stomach.    No ulceration  was seen within the esophagus.  There was free reflux to the  upper esophagus when patient was placed in Trendelenburg from the hiatal  hernia.    IMPRESSION:    1. Abnormal esophagram  2. Moderately distended distal esophagus which drapes over the large hiatal  hernia measuring 16 cm  3. No esophageal peristalsis noted on today's exam  4. Free reflux was seen marked in degree to the upper esophagus.  5. Series 10 demonstrates flow into the dilated distal esophagus an then  flowing into the hiatal hernia with flow into the intraabdominal stomach for  positioning of the openings for endoscopy.  6. Upper GI has been scheduled for Monday to allow this barium to pass through  the bowel.  7. No esophageal ulceration or change suggesting solid mass within the  esophagus is seen    Dave Meredith Jr., MD    DS: 03/23/2017 12:08  Report ID: [id number]  cc: CAN MCCORMACK  FINAL AUTHENTICATED COPY       ASSESSMENT:  Diagnoses and all orders for this visit:    Pulmonary hypertension (CMS/HCC)    Acquired hypothyroidism    Obstructive sleep apnea    PLAN:  Patient with pulmonary hypertension most likely related to the scleroderma and she received to get symptoms of shortness of breath going to arrange for cardiac echo, get PFT, get 6-minute walk  In the meanwhile might consider adding oxygen if needed    Patient has possibility of underlying obstructive sleep apnea will evaluate her later    It is recommended to keep thyroid function optimized to improve quality of sleep    Follow-up after the test    Kayla Diaz MD. D, ABSM.  5/12/2021  13:30 EDT

## 2021-06-03 ENCOUNTER — HOSPITAL ENCOUNTER (OUTPATIENT)
Dept: CARDIOLOGY | Facility: HOSPITAL | Age: 81
Discharge: HOME | End: 2021-06-03

## 2021-06-03 ENCOUNTER — HOSPITAL ENCOUNTER (OUTPATIENT)
Dept: RESPIRATORY THERAPY | Facility: HOSPITAL | Age: 81
Discharge: HOME | End: 2021-06-03

## 2021-06-03 VITALS — RESPIRATION RATE: 16 BRPM | HEART RATE: 64 BPM

## 2021-06-03 DIAGNOSIS — I27.20 PULMONARY HYPERTENSION (HCC): ICD-10-CM

## 2021-06-03 LAB
BH CV ECHO MEAS - ACS: 1.5 CM
BH CV ECHO MEAS - AO MAX PG (FULL): 15.7 MMHG
BH CV ECHO MEAS - AO MAX PG: 20.9 MMHG
BH CV ECHO MEAS - AO MEAN PG (FULL): 9.8 MMHG
BH CV ECHO MEAS - AO MEAN PG: 12.1 MMHG
BH CV ECHO MEAS - AO ROOT AREA (BSA CORRECTED): 2.2
BH CV ECHO MEAS - AO ROOT AREA: 9.3 CM^2
BH CV ECHO MEAS - AO ROOT DIAM: 3.4 CM
BH CV ECHO MEAS - AO V2 MAX: 228.6 CM/SEC
BH CV ECHO MEAS - AO V2 MEAN: 166 CM/SEC
BH CV ECHO MEAS - AO V2 VTI: 56.9 CM
BH CV ECHO MEAS - ASC AORTA: 3.5 CM
BH CV ECHO MEAS - AVA(I,A): 1.6 CM^2
BH CV ECHO MEAS - AVA(I,D): 1.6 CM^2
BH CV ECHO MEAS - AVA(V,A): 1.6 CM^2
BH CV ECHO MEAS - AVA(V,D): 1.6 CM^2
BH CV ECHO MEAS - BSA(HAYCOCK): 1.6 M^2
BH CV ECHO MEAS - BSA: 1.6 M^2
BH CV ECHO MEAS - BZI_BMI: 26.2 KILOGRAMS/M^2
BH CV ECHO MEAS - BZI_METRIC_HEIGHT: 152.4 CM
BH CV ECHO MEAS - BZI_METRIC_WEIGHT: 60.8 KG
BH CV ECHO MEAS - EDV(CUBED): 64 ML
BH CV ECHO MEAS - EDV(MOD-SP4): 54.8 ML
BH CV ECHO MEAS - EDV(TEICH): 70 ML
BH CV ECHO MEAS - EF(CUBED): 72.7 %
BH CV ECHO MEAS - EF(MOD-BP): 75 %
BH CV ECHO MEAS - EF(MOD-SP4): 75.5 %
BH CV ECHO MEAS - EF(TEICH): 65 %
BH CV ECHO MEAS - ESV(CUBED): 17.5 ML
BH CV ECHO MEAS - ESV(MOD-SP4): 13.5 ML
BH CV ECHO MEAS - ESV(TEICH): 24.5 ML
BH CV ECHO MEAS - FS: 35.1 %
BH CV ECHO MEAS - IVS/LVPW: 0.64
BH CV ECHO MEAS - IVSD: 1 CM
BH CV ECHO MEAS - LA DIMENSION(2D): 4.1 CM
BH CV ECHO MEAS - LV DIASTOLIC VOL/BSA (35-75): 34.8 ML/M^2
BH CV ECHO MEAS - LV MASS(C)D: 190.2 GRAMS
BH CV ECHO MEAS - LV MASS(C)DI: 120.8 GRAMS/M^2
BH CV ECHO MEAS - LV MAX PG: 5.2 MMHG
BH CV ECHO MEAS - LV MEAN PG: 2.4 MMHG
BH CV ECHO MEAS - LV SYSTOLIC VOL/BSA (12-30): 8.5 ML/M^2
BH CV ECHO MEAS - LV V1 MAX: 114.2 CM/SEC
BH CV ECHO MEAS - LV V1 MEAN: 69.8 CM/SEC
BH CV ECHO MEAS - LV V1 VTI: 28 CM
BH CV ECHO MEAS - LVIDD: 4 CM
BH CV ECHO MEAS - LVIDS: 2.6 CM
BH CV ECHO MEAS - LVOT AREA: 3.2 CM^2
BH CV ECHO MEAS - LVOT DIAM: 2 CM
BH CV ECHO MEAS - LVPWD: 1.6 CM
BH CV ECHO MEAS - MR MAX PG: 108.2 MMHG
BH CV ECHO MEAS - MR MAX VEL: 519.1 CM/SEC
BH CV ECHO MEAS - MV A MAX VEL: 45.4 CM/SEC
BH CV ECHO MEAS - MV DEC SLOPE: 673.1 CM/SEC^2
BH CV ECHO MEAS - MV DEC TIME: 0.18 SEC
BH CV ECHO MEAS - MV E MAX VEL: 119.8 CM/SEC
BH CV ECHO MEAS - MV E/A: 2.6
BH CV ECHO MEAS - MV MAX PG: 6.4 MMHG
BH CV ECHO MEAS - MV MEAN PG: 1.6 MMHG
BH CV ECHO MEAS - MV V2 MAX: 126.3 CM/SEC
BH CV ECHO MEAS - MV V2 MEAN: 54.9 CM/SEC
BH CV ECHO MEAS - MV V2 VTI: 28.3 CM
BH CV ECHO MEAS - MVA(VTI): 3.1 CM^2
BH CV ECHO MEAS - PA ACC TIME: 0.13 SEC
BH CV ECHO MEAS - PA MAX PG (FULL): 0.76 MMHG
BH CV ECHO MEAS - PA MAX PG: 3.1 MMHG
BH CV ECHO MEAS - PA MEAN PG (FULL): 0.59 MMHG
BH CV ECHO MEAS - PA MEAN PG: 1.7 MMHG
BH CV ECHO MEAS - PA PR(ACCEL): 19.8 MMHG
BH CV ECHO MEAS - PA V2 MAX: 87.5 CM/SEC
BH CV ECHO MEAS - PA V2 MEAN: 60.8 CM/SEC
BH CV ECHO MEAS - PA V2 VTI: 22.3 CM
BH CV ECHO MEAS - PI END-D VEL: 85.5 CM/SEC
BH CV ECHO MEAS - PULM A REVS VEL: 20.4 CM/SEC
BH CV ECHO MEAS - PULM DIAS VEL: 46.1 CM/SEC
BH CV ECHO MEAS - PULM S/D: 1.1
BH CV ECHO MEAS - PULM SYS VEL: 51.4 CM/SEC
BH CV ECHO MEAS - RAP SYSTOLE: 3 MMHG
BH CV ECHO MEAS - RV MAX PG: 2.3 MMHG
BH CV ECHO MEAS - RV MEAN PG: 1.1 MMHG
BH CV ECHO MEAS - RV V1 MAX: 75.9 CM/SEC
BH CV ECHO MEAS - RV V1 MEAN: 48.6 CM/SEC
BH CV ECHO MEAS - RV V1 VTI: 19.7 CM
BH CV ECHO MEAS - RVDD: 2.4 CM
BH CV ECHO MEAS - RVSP: 53.4 MMHG
BH CV ECHO MEAS - SI(AO): 335.5 ML/M^2
BH CV ECHO MEAS - SI(CUBED): 29.5 ML/M^2
BH CV ECHO MEAS - SI(LVOT): 56.2 ML/M^2
BH CV ECHO MEAS - SI(MOD-SP4): 26.3 ML/M^2
BH CV ECHO MEAS - SI(TEICH): 28.9 ML/M^2
BH CV ECHO MEAS - SV(AO): 528.3 ML
BH CV ECHO MEAS - SV(CUBED): 46.5 ML
BH CV ECHO MEAS - SV(LVOT): 88.4 ML
BH CV ECHO MEAS - SV(MOD-SP4): 41.4 ML
BH CV ECHO MEAS - SV(TEICH): 45.5 ML
BH CV ECHO MEAS - TR MAX VEL: 354.7 CM/SEC
LV EF 2D ECHO EST: 60 %

## 2021-06-03 PROCEDURE — 93306 TTE W/DOPPLER COMPLETE: CPT | Performed by: INTERNAL MEDICINE

## 2021-06-03 PROCEDURE — 93306 TTE W/DOPPLER COMPLETE: CPT

## 2021-06-03 PROCEDURE — A9270 NON-COVERED ITEM OR SERVICE: HCPCS | Performed by: INTERNAL MEDICINE

## 2021-06-03 PROCEDURE — 94060 EVALUATION OF WHEEZING: CPT

## 2021-06-03 PROCEDURE — 63710000001 ALBUTEROL SULFATE HFA 108 (90 BASE) MCG/ACT AEROSOL SOLUTION 6.7 G INHALER: Performed by: INTERNAL MEDICINE

## 2021-06-03 PROCEDURE — 94729 DIFFUSING CAPACITY: CPT

## 2021-06-03 PROCEDURE — 94726 PLETHYSMOGRAPHY LUNG VOLUMES: CPT

## 2021-06-03 RX ORDER — ALBUTEROL SULFATE 90 UG/1
2 AEROSOL, METERED RESPIRATORY (INHALATION) ONCE
Status: COMPLETED | OUTPATIENT
Start: 2021-06-03 | End: 2021-06-03

## 2021-06-03 RX ADMIN — ALBUTEROL SULFATE 2 PUFF: 108 AEROSOL, METERED RESPIRATORY (INHALATION) at 12:49

## 2021-06-03 NOTE — NURSING NOTE
Exercise Oximetry    Patient Name:Anitra Nobles   MRN: [id number]   Date: 06/03/21             ROOM AIR BASELINE   SpO2% 94   Heart Rate 64   Blood Pressure      EXERCISE ON ROOM AIR SpO2% EXERCISE ON O2 @  LPM SpO2%   1 MINUTE 94 1 MINUTE    2 MINUTES 95 2 MINUTES    3 MINUTES 93 3 MINUTES    4 MINUTES 93 4 MINUTES    5 MINUTES 92 5 MINUTES    6 MINUTES 92 6 MINUTES               Distance Walked   Distance Walked   Dyspnea (Rafa Scale)   Dyspnea (Rafa Scale)   Fatigue (Rafa Scale)   Fatigue (Rafa Scale)   SpO2% Post Exercise  95 SpO2% Post Exercise   HR Post Exercise  60 HR Post Exercise   Time to Recovery  IMMEDIATELY Time to Recovery     Comments: PATIENT WALKED ON ROOM AIR, O2 SAT REMAINED 92% AND ABOVE THRU-OUT THE WALK

## 2021-06-10 ENCOUNTER — OFFICE VISIT (OUTPATIENT)
Dept: PULMONOLOGY | Facility: HOSPITAL | Age: 81
End: 2021-06-10

## 2021-06-10 VITALS
TEMPERATURE: 98.4 F | WEIGHT: 134 LBS | RESPIRATION RATE: 13 BRPM | SYSTOLIC BLOOD PRESSURE: 121 MMHG | HEIGHT: 59 IN | OXYGEN SATURATION: 98 % | DIASTOLIC BLOOD PRESSURE: 73 MMHG | BODY MASS INDEX: 27.01 KG/M2 | HEART RATE: 64 BPM

## 2021-06-10 DIAGNOSIS — J96.21 ACUTE ON CHRONIC RESPIRATORY FAILURE WITH HYPOXIA (HCC): Primary | ICD-10-CM

## 2021-06-10 DIAGNOSIS — I27.20 PULMONARY HYPERTENSION (HCC): ICD-10-CM

## 2021-06-10 DIAGNOSIS — R06.02 SHORTNESS OF BREATH: ICD-10-CM

## 2021-06-10 PROBLEM — K62.3 RECTAL PROLAPSE: Status: ACTIVE | Noted: 2018-10-11

## 2021-06-10 PROBLEM — K44.9 HIATAL HERNIA: Status: ACTIVE | Noted: 2021-06-10

## 2021-06-10 PROBLEM — I73.00 RAYNAUD'S DISEASE: Status: ACTIVE | Noted: 2021-06-10

## 2021-06-10 PROBLEM — M34.1 CALCINOSIS, RAYNAUD PHENOMENON, ESOPHAGEAL DYSFUNCTION, SCLERODACTYLY, AND TELANGIECTASIA (CREST) SYNDROME: Status: ACTIVE | Noted: 2021-06-10

## 2021-06-10 PROBLEM — I10 HYPERTENSIVE DISORDER: Status: ACTIVE | Noted: 2018-06-19

## 2021-06-10 PROCEDURE — G0463 HOSPITAL OUTPT CLINIC VISIT: HCPCS

## 2021-06-10 RX ORDER — CHLORAL HYDRATE 500 MG
1 CAPSULE ORAL DAILY
COMMUNITY
End: 2021-10-13

## 2021-06-10 RX ORDER — DIPHENOXYLATE HYDROCHLORIDE AND ATROPINE SULFATE 2.5; .025 MG/1; MG/1
1 TABLET ORAL DAILY
COMMUNITY
End: 2021-10-13

## 2021-06-10 NOTE — PROGRESS NOTES
PULMONARY  CONSULT NOTE      PATIENT IDENTIFICATION:  Name: Anitra Nobles  Age: 80 y.o.  Sex: female  :  1940  MRN: [id number]    DATE OF CONSULTATION:  6/10/2021                     CHIEF COMPLAINT:  SOB    History of Present Illness:   Anitra Nobles is a 80 y.o. female patient came for follow-up on her pulmonary function test showing possible small airway obstructive lung disease patient still having significant dyspnea exertion, no chest pain no radiation no dizziness no lightheadedness no suspiciousness    She has pulmonary hypertension showing cardiac echo      Review of Systems:   Constitutional:  As above   Eyes: negative   ENT/oropharynx: negative   Cardiovascular: negative   Respiratory:  As above   Gastrointestinal: negative   Genitourinary: negative   Neurological: negative   Musculoskeletal: negative   Integument/breast: negative   Endocrine: negative   Allergic/Immunologic: negative     Past Medical History:  Past Medical History:   Diagnosis Date   • Anemia    • Anxiety    • Arthritis    • Depression    • GERD (gastroesophageal reflux disease)    • Hypertension    • Pulmonary hypertension (CMS/HCC) 3/19/2018   • Raynauds disease    • Shortness of breath 2016   • Thyroid disorder        Past Surgical History:  Past Surgical History:   Procedure Laterality Date   • APPENDECTOMY N/A     Dr. Adame   • BLADDER REPAIR  2019   • BREAST BIOPSY Left     BENIGN   • CATARACT EXTRACTION      Dr. Shaffer   • COLONOSCOPY N/A    • RECTAL PROLAPSE REPAIR  2019   • THYROIDECTOMY, PARTIAL     • UPPER GASTROINTESTINAL ENDOSCOPY N/A     Dr. Huerta        Family History:  Family History   Problem Relation Age of Onset   • Heart disease Father    • Ovarian cancer Sister    • Colon polyps Sister    • Colon cancer Brother    • Colon polyps Brother         Social History:   Social History     Tobacco Use   • Smoking status: Former Smoker     Quit date: 1979     Years since  "quittin.4   • Smokeless tobacco: Never Used   Substance Use Topics   • Alcohol use: No        Allergies:  No Known Allergies    Home Meds:  (Not in a hospital admission)      Objective:    Vitals Ranges:   Temp:  [98.4 °F (36.9 °C)] 98.4 °F (36.9 °C)  Heart Rate:  [64] 64  Resp:  [13] 13  BP: (121)/(73) 121/73  Body mass index is 27.06 kg/m².     Exam:  General Appearance:  WDWN    HEENT:   without obvious abnormality,  Conjunctiva/corneas clear,  Normal external ear canals, no drainage    Clear orsalmucosa,  Mallampati score 3    Neck:  Supple, symmetrical, trachea midline. No JVD.  Lungs:   Bilateral basal rhonchi bilaterally, respirations unlabored symmetrical wall movement.    Chest wall:  No tenderness or deformity.    Heart:  Regular rate and rhythm, S1 and S2 normal.  Extremities: Trace edema no clubbing or Cyanosis        Data Review:  All labs (24hrs): No results found for this or any previous visit (from the past 24 hour(s)).     Imaging:  Pulmonary Function Test  Procedure complete pulmonary function test    PATIENT IDENTIFICATION:  Name: Anitra Nobles  Age: 80 y.o.  Sex: female  :  1940  MRN: [id number]  Date Of Test: 6/3/2021  Indication: Shortness of breath      Weight 130 lb Height 59\" BSA 1.54    1. The spirometry showing the patient has   decreased FEV1 FVC ratio, FEV1   of 80% reflecting mild chronic obstructive airway disease there is no   significant improvement with bronchodilator, decrease minute ventilation   volume to 66% predicted    2. Lung volumes within normal limits     3. Diffusion capacity  decreased to 52 of predicted corrected to normal  For ventilated alveoli   4. Volume loops consistent with COPD    Impression: This pulmonary function test showing that  the patient has   mild chronic obstructive airway disease    Kayla Diaz MD. D, ABSM.  2021  19:26 EDT        ASSESSMENT:  Diagnoses and all orders for this visit:    Acute on chronic respiratory failure " with hypoxia (CMS/HCC)  -     CT Chest Without Contrast Diagnostic; Future    Pulmonary hypertension (CMS/HCC)    Shortness of breath    Other orders  -     Omega-3 Fatty Acids (fish oil) 1000 MG capsule capsule; Take 1 capsule by mouth Daily.  -     multivitamin (Multi Vitamin) tablet tablet; Take 1 tablet by mouth Daily.  -     Cranberry 450 MG tablet; Take 1 tablet by mouth Daily.  -     ascorbic acid (VITAMIN C) 1000 MG tablet; Take 1 tablet by mouth Daily.  -     Acetaminophen 500 MG capsule; Take  by mouth Every 6 (Six) Hours As Needed.        PLAN:  Get CT chest  Might consider starting a bronchodilator  And will start her on sildenafil next evaluation     It is recommended to keep thyroid function optimized to improve quality of sleep    Follow-up 3 months    Kayla Diaz MD. D, ABSM.  6/10/2021  14:44 EDT

## 2021-07-08 ENCOUNTER — OFFICE VISIT (OUTPATIENT)
Dept: PULMONOLOGY | Facility: HOSPITAL | Age: 81
End: 2021-07-08

## 2021-07-08 VITALS
WEIGHT: 132.8 LBS | OXYGEN SATURATION: 95 % | HEART RATE: 84 BPM | SYSTOLIC BLOOD PRESSURE: 109 MMHG | HEIGHT: 59 IN | RESPIRATION RATE: 15 BRPM | TEMPERATURE: 97.2 F | DIASTOLIC BLOOD PRESSURE: 67 MMHG | BODY MASS INDEX: 26.77 KG/M2

## 2021-07-08 DIAGNOSIS — I27.20 PULMONARY HYPERTENSION (HCC): Primary | ICD-10-CM

## 2021-07-08 DIAGNOSIS — J44.9 OBSTRUCTIVE CHRONIC BRONCHITIS WITHOUT EXACERBATION (HCC): ICD-10-CM

## 2021-07-08 PROBLEM — J44.89 OBSTRUCTIVE CHRONIC BRONCHITIS WITHOUT EXACERBATION: Status: ACTIVE | Noted: 2021-07-08

## 2021-07-08 PROCEDURE — G0463 HOSPITAL OUTPT CLINIC VISIT: HCPCS

## 2021-07-08 RX ORDER — SILDENAFIL CITRATE 20 MG/1
20 TABLET ORAL 3 TIMES DAILY
Qty: 90 TABLET | Refills: 10 | Status: SHIPPED | OUTPATIENT
Start: 2021-07-08 | End: 2021-10-13

## 2021-07-08 NOTE — PROGRESS NOTES
PULMONARY  CONSULT NOTE      PATIENT IDENTIFICATION:  Name: Anitra Nobles  Age: 80 y.o.  Sex: female  :  1940  MRN: [id number]    DATE OF CONSULTATION:  2021                     CHIEF COMPLAINT: Shortness of breath    History of Present Illness:   Anitra Nobles is a 80 y.o. female normocytic history of COPD, and increasing dyspnea on exertion and some dry coughing she had cardiac echo showed she has prior to pressure of 60 and she was diagnosed before with secondary pulmonary hypertension      Review of Systems:   Constitutional: negative   Eyes: negative   ENT/oropharynx: negative   Cardiovascular: negative   Respiratory: negative   Gastrointestinal: negative   Genitourinary: negative   Neurological: negative   Musculoskeletal: negative   Integument/breast: negative   Endocrine: negative   Allergic/Immunologic: negative     Past Medical History:  Past Medical History:   Diagnosis Date   • Anemia    • Anxiety    • Arthritis    • Depression    • GERD (gastroesophageal reflux disease)    • Hypertension    • Pulmonary hypertension (CMS/HCC) 3/19/2018   • Raynauds disease    • Shortness of breath 2016   • Thyroid disorder        Past Surgical History:  Past Surgical History:   Procedure Laterality Date   • APPENDECTOMY N/A     Dr. Adame   • BLADDER REPAIR  2019   • BREAST BIOPSY Left     BENIGN   • CATARACT EXTRACTION      Dr. Shaffer   • COLONOSCOPY N/A    • RECTAL PROLAPSE REPAIR  2019   • THYROIDECTOMY, PARTIAL     • UPPER GASTROINTESTINAL ENDOSCOPY N/A     Dr. Huerta        Family History:  Family History   Problem Relation Age of Onset   • Heart disease Father    • Ovarian cancer Sister    • Colon polyps Sister    • Colon cancer Brother    • Colon polyps Brother         Social History:   Social History     Tobacco Use   • Smoking status: Former Smoker     Quit date:      Years since quittin.5   • Smokeless tobacco: Never Used   Substance Use Topics   •  "Alcohol use: No        Allergies:  No Known Allergies    Home Meds:  (Not in a hospital admission)      Objective:    Vitals Ranges:   Temp:  [97.2 °F (36.2 °C)] 97.2 °F (36.2 °C)  Heart Rate:  [84] 84  Resp:  [15] 15  BP: (109)/(67) 109/67  Body mass index is 26.82 kg/m².     Exam:  General Appearance:  WDWN    HEENT:   without obvious abnormality,  Conjunctiva/corneas clear,  Normal external ear canals, no drainage    Clear orsalmucosa,  Mallampati score 3    Neck:  Supple, symmetrical, trachea midline. No JVD.  Lungs:   Bilateral basal rhonchi bilaterally, respirations unlabored symmetrical wall movement.    Chest wall:  No tenderness or deformity.    Heart:  Regular rate and rhythm, S1 and S2 normal.  Extremities: Trace edema no clubbing or Cyanosis        Data Review:  All labs (24hrs): No results found for this or any previous visit (from the past 24 hour(s)).     Imaging:  Pulmonary Function Test  Procedure complete pulmonary function test    PATIENT IDENTIFICATION:  Name: Anitra Nobles  Age: 80 y.o.  Sex: female  :  1940  MRN: [id number]  Date Of Test: 6/3/2021  Indication: Shortness of breath      Weight 130 lb Height 59\" BSA 1.54    1. The spirometry showing the patient has   decreased FEV1 FVC ratio, FEV1   of 80% reflecting mild chronic obstructive airway disease there is no   significant improvement with bronchodilator, decrease minute ventilation   volume to 66% predicted    2. Lung volumes within normal limits     3. Diffusion capacity  decreased to 52 of predicted corrected to normal  For ventilated alveoli   4. Volume loops consistent with COPD    Impression: This pulmonary function test showing that  the patient has   mild chronic obstructive airway disease    Kayla Diaz MD. D, ABSM.  2021  19:26 EDT        ASSESSMENT:  Diagnoses and all orders for this visit:    Pulmonary hypertension (CMS/HCC)    Obstructive chronic bronchitis without exacerbation (CMS/HCC)    Other " orders  -     sildenafil (REVATIO) 20 MG tablet; Take 1 tablet by mouth 3 (Three) Times a Day.        PLAN:  Discussed the patient about start using oxygen at home but she is very reluctant  We will add sildenafil 20 mg 3 times daily    Continue with pulmonary rehab    Follow-up 2 months    Kayla Diaz MD. D, ABSM.  7/8/2021  15:46 EDT

## 2021-10-13 ENCOUNTER — OFFICE VISIT (OUTPATIENT)
Dept: CARDIOLOGY | Facility: CLINIC | Age: 81
End: 2021-10-13

## 2021-10-13 ENCOUNTER — HOSPITAL ENCOUNTER (OUTPATIENT)
Dept: CARDIOLOGY | Facility: HOSPITAL | Age: 81
Discharge: HOME | End: 2021-10-13
Admitting: INTERNAL MEDICINE

## 2021-10-13 VITALS
HEIGHT: 59 IN | SYSTOLIC BLOOD PRESSURE: 158 MMHG | BODY MASS INDEX: 26.21 KG/M2 | DIASTOLIC BLOOD PRESSURE: 75 MMHG | HEART RATE: 141 BPM | WEIGHT: 130 LBS | OXYGEN SATURATION: 98 %

## 2021-10-13 VITALS
SYSTOLIC BLOOD PRESSURE: 140 MMHG | WEIGHT: 138 LBS | HEIGHT: 59 IN | BODY MASS INDEX: 27.82 KG/M2 | DIASTOLIC BLOOD PRESSURE: 65 MMHG

## 2021-10-13 DIAGNOSIS — E03.9 HYPOTHYROIDISM (ACQUIRED): ICD-10-CM

## 2021-10-13 DIAGNOSIS — R06.02 SHORTNESS OF BREATH: ICD-10-CM

## 2021-10-13 DIAGNOSIS — I27.20 PULMONARY HYPERTENSION (HCC): Primary | ICD-10-CM

## 2021-10-13 DIAGNOSIS — I34.0 NONRHEUMATIC MITRAL VALVE REGURGITATION: ICD-10-CM

## 2021-10-13 LAB
BH CV ECHO MEAS - ACS: 0.91 CM
BH CV ECHO MEAS - AI DEC SLOPE: 99.5 CM/SEC^2
BH CV ECHO MEAS - AI DEC TIME: 1.7 SEC
BH CV ECHO MEAS - AI MAX PG: 11 MMHG
BH CV ECHO MEAS - AI MAX VEL: 165.6 CM/SEC
BH CV ECHO MEAS - AI P1/2T: 487.5 MSEC
BH CV ECHO MEAS - AO MAX PG (FULL): 13.5 MMHG
BH CV ECHO MEAS - AO MAX PG: 19.4 MMHG
BH CV ECHO MEAS - AO MEAN PG (FULL): 7.6 MMHG
BH CV ECHO MEAS - AO MEAN PG: 10.6 MMHG
BH CV ECHO MEAS - AO ROOT AREA (BSA CORRECTED): 1.9
BH CV ECHO MEAS - AO ROOT AREA: 6.8 CM^2
BH CV ECHO MEAS - AO ROOT DIAM: 2.9 CM
BH CV ECHO MEAS - AO V2 MAX: 220.3 CM/SEC
BH CV ECHO MEAS - AO V2 MEAN: 152.7 CM/SEC
BH CV ECHO MEAS - AO V2 VTI: 53.4 CM
BH CV ECHO MEAS - ASC AORTA: 2.7 CM
BH CV ECHO MEAS - AVA(I,A): 1.4 CM^2
BH CV ECHO MEAS - AVA(I,D): 1.4 CM^2
BH CV ECHO MEAS - AVA(V,A): 1.5 CM^2
BH CV ECHO MEAS - AVA(V,D): 1.5 CM^2
BH CV ECHO MEAS - BSA(HAYCOCK): 1.6 M^2
BH CV ECHO MEAS - BSA: 1.5 M^2
BH CV ECHO MEAS - BZI_BMI: 26.7 KILOGRAMS/M^2
BH CV ECHO MEAS - BZI_METRIC_HEIGHT: 149.9 CM
BH CV ECHO MEAS - BZI_METRIC_WEIGHT: 59.9 KG
BH CV ECHO MEAS - EDV(CUBED): 125.1 ML
BH CV ECHO MEAS - EDV(MOD-SP4): 58.2 ML
BH CV ECHO MEAS - EDV(TEICH): 118.3 ML
BH CV ECHO MEAS - EF(CUBED): 47.9 %
BH CV ECHO MEAS - EF(MOD-SP4): 80.3 %
BH CV ECHO MEAS - EF(TEICH): 40 %
BH CV ECHO MEAS - ESV(CUBED): 65.2 ML
BH CV ECHO MEAS - ESV(MOD-SP4): 11.5 ML
BH CV ECHO MEAS - ESV(TEICH): 71 ML
BH CV ECHO MEAS - FS: 19.5 %
BH CV ECHO MEAS - IVS/LVPW: 0.88
BH CV ECHO MEAS - IVSD: 0.73 CM
BH CV ECHO MEAS - LA DIMENSION: 5.5 CM
BH CV ECHO MEAS - LA/AO: 1.9
BH CV ECHO MEAS - LV DIASTOLIC VOL/BSA (35-75): 37.7 ML/M^2
BH CV ECHO MEAS - LV MASS(C)D: 131 GRAMS
BH CV ECHO MEAS - LV MASS(C)DI: 84.8 GRAMS/M^2
BH CV ECHO MEAS - LV MAX PG: 5.9 MMHG
BH CV ECHO MEAS - LV MEAN PG: 3 MMHG
BH CV ECHO MEAS - LV SYSTOLIC VOL/BSA (12-30): 7.4 ML/M^2
BH CV ECHO MEAS - LV V1 MAX: 121.8 CM/SEC
BH CV ECHO MEAS - LV V1 MEAN: 82.4 CM/SEC
BH CV ECHO MEAS - LV V1 VTI: 27.7 CM
BH CV ECHO MEAS - LVIDD: 5 CM
BH CV ECHO MEAS - LVIDS: 4 CM
BH CV ECHO MEAS - LVOT AREA: 2.7 CM^2
BH CV ECHO MEAS - LVOT DIAM: 1.9 CM
BH CV ECHO MEAS - LVPWD: 0.83 CM
BH CV ECHO MEAS - MV A MAX VEL: 60.5 CM/SEC
BH CV ECHO MEAS - MV DEC SLOPE: 458.2 CM/SEC^2
BH CV ECHO MEAS - MV DEC TIME: 0.23 SEC
BH CV ECHO MEAS - MV E MAX VEL: 106.3 CM/SEC
BH CV ECHO MEAS - MV E/A: 1.8
BH CV ECHO MEAS - MV MAX PG: 5.9 MMHG
BH CV ECHO MEAS - MV MEAN PG: 1.6 MMHG
BH CV ECHO MEAS - MV V2 MAX: 121.1 CM/SEC
BH CV ECHO MEAS - MV V2 MEAN: 54.2 CM/SEC
BH CV ECHO MEAS - MV V2 VTI: 32.3 CM
BH CV ECHO MEAS - MVA(VTI): 2.3 CM^2
BH CV ECHO MEAS - PA ACC TIME: 0.11 SEC
BH CV ECHO MEAS - PA MAX PG (FULL): 0.47 MMHG
BH CV ECHO MEAS - PA MAX PG: 3.2 MMHG
BH CV ECHO MEAS - PA MEAN PG (FULL): 0.46 MMHG
BH CV ECHO MEAS - PA MEAN PG: 1.9 MMHG
BH CV ECHO MEAS - PA PR(ACCEL): 27.6 MMHG
BH CV ECHO MEAS - PA V2 MAX: 89.7 CM/SEC
BH CV ECHO MEAS - PA V2 MEAN: 67.1 CM/SEC
BH CV ECHO MEAS - PA V2 VTI: 20.4 CM
BH CV ECHO MEAS - RAP SYSTOLE: 3 MMHG
BH CV ECHO MEAS - RV MAX PG: 2.8 MMHG
BH CV ECHO MEAS - RV MEAN PG: 1.5 MMHG
BH CV ECHO MEAS - RV V1 MAX: 82.9 CM/SEC
BH CV ECHO MEAS - RV V1 MEAN: 57.9 CM/SEC
BH CV ECHO MEAS - RV V1 VTI: 18.4 CM
BH CV ECHO MEAS - RVDD: 2.6 CM
BH CV ECHO MEAS - RVSP: 49.8 MMHG
BH CV ECHO MEAS - SI(AO): 235.9 ML/M^2
BH CV ECHO MEAS - SI(CUBED): 38.8 ML/M^2
BH CV ECHO MEAS - SI(LVOT): 48.4 ML/M^2
BH CV ECHO MEAS - SI(MOD-SP4): 30.2 ML/M^2
BH CV ECHO MEAS - SI(TEICH): 30.6 ML/M^2
BH CV ECHO MEAS - SV(AO): 364.6 ML
BH CV ECHO MEAS - SV(CUBED): 59.9 ML
BH CV ECHO MEAS - SV(LVOT): 74.8 ML
BH CV ECHO MEAS - SV(MOD-SP4): 46.7 ML
BH CV ECHO MEAS - SV(TEICH): 47.3 ML
BH CV ECHO MEAS - TR MAX VEL: 340.8 CM/SEC
LV EF 2D ECHO EST: 60 %
MAXIMAL PREDICTED HEART RATE: 140 BPM
STRESS TARGET HR: 119 BPM

## 2021-10-13 PROCEDURE — 93306 TTE W/DOPPLER COMPLETE: CPT

## 2021-10-13 PROCEDURE — 99214 OFFICE O/P EST MOD 30 MIN: CPT | Performed by: INTERNAL MEDICINE

## 2021-10-13 PROCEDURE — 93306 TTE W/DOPPLER COMPLETE: CPT | Performed by: INTERNAL MEDICINE

## 2021-10-13 PROCEDURE — 93000 ELECTROCARDIOGRAM COMPLETE: CPT | Performed by: INTERNAL MEDICINE

## 2021-10-13 RX ORDER — ALBUTEROL SULFATE 90 UG/1
AEROSOL, METERED RESPIRATORY (INHALATION)
COMMUNITY
Start: 2021-09-01

## 2021-10-13 RX ORDER — IBUPROFEN 800 MG/1
TABLET ORAL
COMMUNITY
Start: 2021-08-30

## 2021-10-13 NOTE — PROGRESS NOTES
Encounter Date:10/13/2021  Last seen 4/6/2021      Patient ID: Anitra Nobles is a 81 y.o. female.    Chief Complaint:    Mitral regurgitation  History of shortness of breath  Hypothyroidism     History of Present Illness  Shortness of breath is better with being on nebulizer.    Since I have last seen, the patient has been without any chest discomfort ,shortness of breath, palpitations, dizziness or syncope.  Denies having any headache ,abdominal pain ,nausea, vomiting , diarrhea constipation, loss of weight or loss of appetite.  Denies having any excessive bruising ,hematuria or blood in the stool.     Review of all systems negative except as indicated.     Reviewed ROS.    Assessment and Plan            /////////////////////////////    Impression  ====================   -Shortness of breath-better on nebulizer     - history of 2 to 3+ mitral regurgitation.    Echocardiogram 10/13/2021 revealed  Mitral annular calcification.  Moderate mitral regurgitation  Mild tricuspid regurgitation  Pulmonary hypertension with pulmonary artery pressure of 56 mmHg.  Moderate aortic valve stenosis  Biatrial enlargement  Normal left ventricular size and contractility with ejection fraction of 60%.     Cardiac catheterization 03/23/2018 revealed normal coronary arteries.  No evidence for pulmonary hypertension.  Two to 3+ mitral regurgitation normal left ventricular function.     Fantasma 03/23/2018 revealed left atrial enlargement moderate mitral and tricuspid regurgitation mild aortic valve stenosis and pulmonary artery pressure of 50 mm of mercury.     Echocardiogram 02/12/2018 showed normal left ventricular function significant pulmonary hypertension with pulmonary artery pressure of 60 mm of mercury.  Mild aortic valve stenosis.  Left atrial enlargement.     -anemia     -anxiety depression     -History of esophageal stricture.     - status post partial thyroidectomy ganglionectomy and appendectomy     -former  smoker     -hypothyroidism     -family history of coronary artery disease     - history of Raynaud's phenomena  ====================   Plan  ================  Shortness of breath-better.  Patient is on nebulizer.    Mitral regurgitation  Patient is not having any angina pectoris or congestive heart failure.    EKG showed sinus rhythm without any ischemic changes.  Echocardiogram-as above 10/13/2021     Hypothyroidism-continue levothyroxine     Medications were reviewed and updated.  Followup in the office in 6 months .  Further plan will depend on patient's progress.  //////////////////////////////////////////                            Diagnosis Plan   1. Pulmonary hypertension (HCC)  ECG 12 Lead   2. Shortness of breath  ECG 12 Lead   3. Nonrheumatic mitral valve regurgitation  ECG 12 Lead   4. Hypothyroidism (acquired)  ECG 12 Lead   LAB RESULTS (LAST 7 DAYS)    CBC        BMP        CMP         BNP        TROPONIN        CoAg        Creatinine Clearance  CrCl cannot be calculated (Patient's most recent lab result is older than the maximum 30 days allowed.).    ABG        Radiology  No radiology results for the last day                The following portions of the patient's history were reviewed and updated as appropriate: allergies, current medications, past family history, past medical history, past social history, past surgical history and problem list.    Review of Systems   Constitutional: Negative for fever and malaise/fatigue.   HENT: Negative for ear pain and nosebleeds.    Eyes: Negative for blurred vision and double vision.   Cardiovascular: Negative for chest pain, dyspnea on exertion and palpitations.   Respiratory: Positive for shortness of breath. Negative for cough.    Skin: Negative for rash.   Musculoskeletal: Negative for joint pain.   Gastrointestinal: Negative for abdominal pain, nausea and vomiting.   Neurological: Negative for focal weakness and headaches.   Psychiatric/Behavioral: Negative  for depression. The patient is not nervous/anxious.    All other systems reviewed and are negative.        Current Outpatient Medications:   •  Acetaminophen 500 MG capsule, Take  by mouth Every 6 (Six) Hours As Needed., Disp: , Rfl:   •  albuterol sulfate  (90 Base) MCG/ACT inhaler, , Disp: , Rfl:   •  amLODIPine (NORVASC) 5 MG tablet, TK 1 T PO QD, Disp: , Rfl: 10  •  brimonidine-timolol (COMBIGAN) 0.2-0.5 % ophthalmic solution, COMBIGORN, Disp: , Rfl:   •  Calcium Citrate-Vitamin D (CALCIUM + D PO), Take 1,200 mg by mouth Daily., Disp: , Rfl:   •  Cranberry 450 MG tablet, Take 1 tablet by mouth Daily., Disp: , Rfl:   •  escitalopram (LEXAPRO) 20 MG tablet, Take 20 mg by mouth Daily., Disp: , Rfl:   •  esomeprazole (nexIUM) 20 MG capsule, Take 20 mg by mouth Every Morning Before Breakfast., Disp: , Rfl:   •  fluticasone (VERAMYST) 27.5 MCG/SPRAY nasal spray, 2 sprays into the nostril(s) as directed by provider Daily., Disp: , Rfl:   •  latanoprost (XALATAN) 0.005 % ophthalmic solution, LATANOPROST 0.005 % SOLN, Disp: , Rfl:   •  SYNTHROID 75 MCG tablet, Take 75 mcg by mouth Daily., Disp: , Rfl:   •  ibuprofen (ADVIL,MOTRIN) 800 MG tablet, , Disp: , Rfl:     No Known Allergies    Family History   Problem Relation Age of Onset   • Heart disease Father    • Ovarian cancer Sister    • Colon polyps Sister    • Colon cancer Brother    • Colon polyps Brother        Past Surgical History:   Procedure Laterality Date   • APPENDECTOMY N/A 1985    Dr. Adame   • BLADDER REPAIR  04/2019   • BREAST BIOPSY Left 2012    BENIGN   • CATARACT EXTRACTION  2008    Dr. Shaffer   • COLONOSCOPY N/A 2014   • RECTAL PROLAPSE REPAIR  04/2019   • THYROIDECTOMY, PARTIAL  2002   • UPPER GASTROINTESTINAL ENDOSCOPY N/A 2017    Dr. Huerta       Past Medical History:   Diagnosis Date   • Anemia    • Anxiety    • Arthritis    • Depression    • GERD (gastroesophageal reflux disease)    • Hypertension    • Pulmonary hypertension (HCC) 3/19/2018  "  • Raynauds disease    • Shortness of breath 2016   • Thyroid disorder        Family History   Problem Relation Age of Onset   • Heart disease Father    • Ovarian cancer Sister    • Colon polyps Sister    • Colon cancer Brother    • Colon polyps Brother        Social History     Socioeconomic History   • Marital status:    Tobacco Use   • Smoking status: Former Smoker     Quit date:      Years since quittin.8   • Smokeless tobacco: Never Used   Vaping Use   • Vaping Use: Never used   Substance and Sexual Activity   • Alcohol use: No   • Drug use: Never   • Sexual activity: Defer           ECG 12 Lead    Date/Time: 10/13/2021 2:48 PM  Performed by: Brian Malone MD  Authorized by: Brian Malone MD   Comparison: compared with previous ECG   Similar to previous ECG  Comparison to previous ECG: Sinus bradycardia left anterior fascicular block nonspecific ST-T wave changes no ectopy 54/min no significant change from 2021                Objective:       Physical Exam    /75   Pulse (!) 141   Ht 149.9 cm (59\")   Wt 59 kg (130 lb)   SpO2 98%   BMI 26.26 kg/m²   The patient is alert, oriented and in no distress.    Vital signs as noted above.    Head and neck revealed no carotid bruits or jugular venous distension.  No thyromegaly or lymphadenopathy is present.    Lungs clear.  No wheezing.  Breath sounds are normal bilaterally.    Heart normal first and second heart sounds.  No murmur..  No pericardial rub is present.  No gallop is present.    Abdomen soft and nontender.  No organomegaly is present.    Extremities revealed good peripheral pulses without any pedal edema.    Skin warm and dry.    Musculoskeletal system-kyphoscoliosis    CNS grossly normal.    Reviewed and unchanged from last visit.        "

## 2022-03-11 ENCOUNTER — OFFICE (AMBULATORY)
Dept: URBAN - METROPOLITAN AREA CLINIC 64 | Facility: CLINIC | Age: 82
End: 2022-03-11
Payer: COMMERCIAL

## 2022-03-11 VITALS
HEIGHT: 59 IN | SYSTOLIC BLOOD PRESSURE: 116 MMHG | WEIGHT: 129 LBS | DIASTOLIC BLOOD PRESSURE: 68 MMHG | HEART RATE: 66 BPM

## 2022-03-11 DIAGNOSIS — R13.10 DYSPHAGIA, UNSPECIFIED: ICD-10-CM

## 2022-03-11 PROCEDURE — 99204 OFFICE O/P NEW MOD 45 MIN: CPT | Performed by: INTERNAL MEDICINE

## 2022-03-11 RX ORDER — POLYETHYLENE GLYCOL 3350 17 G/17G
POWDER, FOR SOLUTION ORAL
Qty: 525 | Refills: 11 | Status: COMPLETED
Start: 2022-03-11 | End: 2023-07-25

## 2022-03-11 RX ORDER — OMEPRAZOLE 20 MG/1
CAPSULE, DELAYED RELEASE ORAL
Qty: 60 | Refills: 11 | Status: COMPLETED
Start: 2022-03-11 | End: 2023-07-25

## 2022-04-14 ENCOUNTER — OFFICE VISIT (OUTPATIENT)
Dept: CARDIOLOGY | Facility: CLINIC | Age: 82
End: 2022-04-14

## 2022-04-14 VITALS
BODY MASS INDEX: 25.6 KG/M2 | WEIGHT: 127 LBS | DIASTOLIC BLOOD PRESSURE: 83 MMHG | HEIGHT: 59 IN | HEART RATE: 91 BPM | OXYGEN SATURATION: 81 % | SYSTOLIC BLOOD PRESSURE: 144 MMHG

## 2022-04-14 DIAGNOSIS — I34.0 NONRHEUMATIC MITRAL VALVE REGURGITATION: Primary | ICD-10-CM

## 2022-04-14 DIAGNOSIS — I27.20 PULMONARY HYPERTENSION: ICD-10-CM

## 2022-04-14 DIAGNOSIS — R06.02 SHORTNESS OF BREATH: ICD-10-CM

## 2022-04-14 DIAGNOSIS — E03.9 HYPOTHYROIDISM (ACQUIRED): ICD-10-CM

## 2022-04-14 PROCEDURE — 93000 ELECTROCARDIOGRAM COMPLETE: CPT | Performed by: INTERNAL MEDICINE

## 2022-04-14 PROCEDURE — 99214 OFFICE O/P EST MOD 30 MIN: CPT | Performed by: INTERNAL MEDICINE

## 2022-04-14 RX ORDER — OMEPRAZOLE 20 MG/1
20 CAPSULE, DELAYED RELEASE ORAL DAILY
COMMUNITY

## 2022-04-14 NOTE — PROGRESS NOTES
Encounter Date:04/14/2022  Last seen 10/13/2021      Patient ID: Anitra Nobles is a 81 y.o. female.    Chief Complaint:  Mitral regurgitation  History of shortness of breath  Hypothyroidism     History of Present Illness  Since I have last seen, the patient has been without any chest discomfort ,shortness of breath, palpitations, dizziness or syncope.  Denies having any headache ,abdominal pain ,nausea, vomiting , diarrhea constipation, loss of weight or loss of appetite.  Denies having any excessive bruising ,hematuria or blood in the stool.    Review of all systems negative except as indicated.    Reviewed ROS.  Assessment and Plan            /////////////////////////////    Impression  ====================   -Shortness of breath-better on nebulizer     - history of 2 to 3+ mitral regurgitation.     Echocardiogram 10/13/2021 revealed  Mitral annular calcification.  Moderate mitral regurgitation  Mild tricuspid regurgitation  Pulmonary hypertension with pulmonary artery pressure of 56 mmHg.  Moderate aortic valve stenosis  Biatrial enlargement  Normal left ventricular size and contractility with ejection fraction of 60%.     Cardiac catheterization 03/23/2018 revealed normal coronary arteries.  No evidence for pulmonary hypertension.  Two to 3+ mitral regurgitation normal left ventricular function.     Fantasma 03/23/2018 revealed left atrial enlargement moderate mitral and tricuspid regurgitation mild aortic valve stenosis and pulmonary artery pressure of 50 mm of mercury.     Echocardiogram 02/12/2018 showed normal left ventricular function significant pulmonary hypertension with pulmonary artery pressure of 60 mm of mercury.  Mild aortic valve stenosis.  Left atrial enlargement.     -anemia     -anxiety depression     -History of esophageal stricture.     - status post partial thyroidectomy ganglionectomy and appendectomy     -former smoker     -hypothyroidism     -family history of coronary artery disease     -  history of Raynaud's phenomena  ====================   Plan  ================  Shortness of breath-better.  Patient is on nebulizer.     Mitral regurgitation  Patient is not having any angina pectoris or congestive heart failure.  EKG showed sinus rhythm without any ischemic changes.-4/14/2022     Hypothyroidism-continue levothyroxine     Medications were reviewed and updated.  Followup in the office in 6 months .  Further plan will depend on patient's progress.  //////////////////////////////////////////                        Diagnosis Plan   1. Nonrheumatic mitral valve regurgitation     2. Hypothyroidism (acquired)     3. Shortness of breath     4. Pulmonary hypertension (HCC)     LAB RESULTS (LAST 7 DAYS)    CBC        BMP        CMP         BNP        TROPONIN        CoAg        Creatinine Clearance  CrCl cannot be calculated (Patient's most recent lab result is older than the maximum 30 days allowed.).    ABG        Radiology  No radiology results for the last day                The following portions of the patient's history were reviewed and updated as appropriate: allergies, current medications, past family history, past medical history, past social history, past surgical history and problem list.    Review of Systems   Constitutional: Negative for malaise/fatigue.   Cardiovascular: Negative for chest pain, leg swelling, palpitations and syncope.   Respiratory: Negative for shortness of breath.    Skin: Negative for rash.   Gastrointestinal: Negative for nausea and vomiting.   Neurological: Negative for dizziness, light-headedness and numbness.   All other systems reviewed and are negative.        Current Outpatient Medications:   •  Acetaminophen 500 MG capsule, Take  by mouth Every 6 (Six) Hours As Needed., Disp: , Rfl:   •  albuterol sulfate  (90 Base) MCG/ACT inhaler, , Disp: , Rfl:   •  amLODIPine (NORVASC) 5 MG tablet, TK 1 T PO QD, Disp: , Rfl: 10  •  brimonidine-timolol (COMBIGAN) 0.2-0.5 %  ophthalmic solution, COMBIGORN, Disp: , Rfl:   •  Cranberry 450 MG tablet, Take 1 tablet by mouth Daily., Disp: , Rfl:   •  escitalopram (LEXAPRO) 20 MG tablet, Take 20 mg by mouth Daily., Disp: , Rfl:   •  fluticasone (VERAMYST) 27.5 MCG/SPRAY nasal spray, 2 sprays into the nostril(s) as directed by provider Daily., Disp: , Rfl:   •  latanoprost (XALATAN) 0.005 % ophthalmic solution, LATANOPROST 0.005 % SOLN, Disp: , Rfl:   •  omeprazole (priLOSEC) 20 MG capsule, Take 20 mg by mouth Daily., Disp: , Rfl:   •  SYNTHROID 75 MCG tablet, Take 75 mcg by mouth Daily., Disp: , Rfl:   •  Calcium Citrate-Vitamin D (CALCIUM + D PO), Take 1,200 mg by mouth Daily., Disp: , Rfl:   •  esomeprazole (nexIUM) 20 MG capsule, Take 20 mg by mouth Every Morning Before Breakfast., Disp: , Rfl:   •  ibuprofen (ADVIL,MOTRIN) 800 MG tablet, , Disp: , Rfl:     No Known Allergies    Family History   Problem Relation Age of Onset   • Heart disease Father    • Ovarian cancer Sister    • Colon polyps Sister    • Colon cancer Brother    • Colon polyps Brother        Past Surgical History:   Procedure Laterality Date   • APPENDECTOMY N/A 1985    Dr. Adame   • BLADDER REPAIR  04/2019   • BREAST BIOPSY Left 2012    BENIGN   • CATARACT EXTRACTION  2008    Dr. Shaffer   • COLONOSCOPY N/A 2014   • RECTAL PROLAPSE REPAIR  04/2019   • THYROIDECTOMY, PARTIAL  2002   • UPPER GASTROINTESTINAL ENDOSCOPY N/A 2017    Dr. Huerta       Past Medical History:   Diagnosis Date   • Anemia    • Anxiety    • Arthritis    • Depression    • GERD (gastroesophageal reflux disease)    • Hypertension    • Pulmonary hypertension (HCC) 3/19/2018   • Raynauds disease    • Shortness of breath 7/20/2016   • Thyroid disorder        Family History   Problem Relation Age of Onset   • Heart disease Father    • Ovarian cancer Sister    • Colon polyps Sister    • Colon cancer Brother    • Colon polyps Brother        Social History     Socioeconomic History   • Marital status:   "  Tobacco Use   • Smoking status: Former Smoker     Quit date:      Years since quittin.3   • Smokeless tobacco: Never Used   Vaping Use   • Vaping Use: Never used   Substance and Sexual Activity   • Alcohol use: No   • Drug use: Never   • Sexual activity: Defer           ECG 12 Lead    Date/Time: 2022 2:35 PM  Performed by: Brian Malone MD  Authorized by: Brian Malone MD   Comparison: compared with previous ECG   Similar to previous ECG  Comparison to previous ECG: Sinus bradycardia left anterior fascicular block 56/min nonspecific ST-T wave changes no ectopy no change from previous EKG.                Objective:       Physical Exam    /83 (BP Location: Left arm, Patient Position: Sitting, Cuff Size: Adult)   Pulse 91   Ht 149.9 cm (59\")   Wt 57.6 kg (127 lb)   SpO2 (!) 81%   BMI 25.65 kg/m²   The patient is alert, oriented and in no distress.    Vital signs as noted above.    Head and neck revealed no carotid bruits or jugular venous distension.  No thyromegaly or lymphadenopathy is present.    Lungs clear.  No wheezing.  Breath sounds are normal bilaterally.    Heart normal first and second heart sounds.  No murmur..  No pericardial rub is present.  No gallop is present.    Abdomen soft and nontender.  No organomegaly is present.    Extremities revealed good peripheral pulses without any pedal edema.    Skin warm and dry.    Musculoskeletal system is grossly normal.    CNS grossly normal.    Reviewed and updated.        "

## 2022-10-17 ENCOUNTER — OFFICE VISIT (OUTPATIENT)
Dept: CARDIOLOGY | Facility: CLINIC | Age: 82
End: 2022-10-17

## 2022-10-17 VITALS
DIASTOLIC BLOOD PRESSURE: 67 MMHG | BODY MASS INDEX: 25.91 KG/M2 | HEIGHT: 59 IN | WEIGHT: 128.5 LBS | HEART RATE: 62 BPM | SYSTOLIC BLOOD PRESSURE: 138 MMHG

## 2022-10-17 DIAGNOSIS — E03.9 HYPOTHYROIDISM (ACQUIRED): ICD-10-CM

## 2022-10-17 DIAGNOSIS — I34.0 NONRHEUMATIC MITRAL VALVE REGURGITATION: Primary | ICD-10-CM

## 2022-10-17 DIAGNOSIS — I27.20 PULMONARY HYPERTENSION: ICD-10-CM

## 2022-10-17 DIAGNOSIS — R06.02 SHORTNESS OF BREATH: ICD-10-CM

## 2022-10-17 PROCEDURE — 93000 ELECTROCARDIOGRAM COMPLETE: CPT | Performed by: INTERNAL MEDICINE

## 2022-10-17 PROCEDURE — 99214 OFFICE O/P EST MOD 30 MIN: CPT | Performed by: INTERNAL MEDICINE

## 2022-10-17 RX ORDER — LOSARTAN POTASSIUM 50 MG/1
TABLET ORAL
COMMUNITY
Start: 2022-08-28

## 2022-10-17 RX ORDER — CELECOXIB 50 MG/1
CAPSULE ORAL
COMMUNITY
Start: 2022-10-10

## 2023-04-02 NOTE — PROGRESS NOTES
Encounter Date:04/19/2023    Last seen 10/17/2022      Patient ID: Anitra Nobles is a 82 y.o. female.    Chief Complaint:    Mitral regurgitation  History of shortness of breath  Hypothyroidism     History of Present Illness  Lately patient has been having increased shortness of breath.    Since I have last seen, the patient has been without any chest discomfort palpitations, dizziness or syncope.  Denies having any headache ,abdominal pain ,nausea, vomiting , diarrhea constipation, loss of weight or loss of appetite.  Denies having any excessive bruising ,hematuria or blood in the stool.     Review of all systems negative except as indicated.     Reviewed ROS.  Assessment and Plan            /////////////////////////////    Impression  ====================   -Shortness of breath     - history of 2 to 3+ mitral regurgitation.     Echocardiogram 10/13/2021 revealed  Mitral annular calcification.  Moderate mitral regurgitation  Mild tricuspid regurgitation  Pulmonary hypertension with pulmonary artery pressure of 56 mmHg.  Moderate aortic valve stenosis  Biatrial enlargement  Normal left ventricular size and contractility with ejection fraction of 60%.     Cardiac catheterization 03/23/2018 revealed normal coronary arteries.  No evidence for pulmonary hypertension.  Two to 3+ mitral regurgitation normal left ventricular function.     Fantasma 03/23/2018 revealed left atrial enlargement moderate mitral and tricuspid regurgitation mild aortic valve stenosis and pulmonary artery pressure of 50 mm of mercury.     Echocardiogram 02/12/2018 showed normal left ventricular function significant pulmonary hypertension with pulmonary artery pressure of 60 mm of mercury.  Mild aortic valve stenosis.  Left atrial enlargement.     -anemia     -anxiety depression     -History of esophageal stricture.     - status post partial thyroidectomy ganglionectomy and appendectomy     -former smoker     -hypothyroidism     -family history of  coronary artery disease     - history of Raynaud's phenomena  ====================   Plan  ================  Increased shortness of breath lately.     Mitral regurgitation    Pulmonary hypertension probably secondary to mitral regurgitation.    EKG showed sinus rhythm without any ischemic changes.-  10/17/2022    Echocardiogram  Stress Cardiolite test.  Patient may need transesophageal echocardiogram.     Hypothyroidism-continue levothyroxine     Medications were reviewed and updated.     Followup in the office in 6 months on the same day.    Further plan will depend on patient's progress.  //////////////////////////////////////////                           Diagnosis Plan   1. Nonrheumatic mitral valve regurgitation        2. Pulmonary hypertension        3. Hypothyroidism (acquired)        4. Shortness of breath        LAB RESULTS (LAST 7 DAYS)    CBC        BMP        CMP         BNP        TROPONIN        CoAg        Creatinine Clearance  CrCl cannot be calculated (Patient's most recent lab result is older than the maximum 30 days allowed.).    ABG        Radiology  No radiology results for the last day                The following portions of the patient's history were reviewed and updated as appropriate: allergies, current medications, past family history, past medical history, past social history, past surgical history and problem list.    Review of Systems   Constitutional: Negative for malaise/fatigue.   Cardiovascular: Negative for chest pain, dyspnea on exertion, leg swelling and palpitations.   Respiratory: Positive for shortness of breath. Negative for cough.    Gastrointestinal: Negative for abdominal pain, nausea and vomiting.   Neurological: Negative for dizziness, focal weakness, headaches, light-headedness and numbness.   All other systems reviewed and are negative.        Current Outpatient Medications:   •  Acetaminophen 500 MG capsule, Take  by mouth Every 6 (Six) Hours As Needed., Disp: , Rfl:    •  albuterol sulfate  (90 Base) MCG/ACT inhaler, , Disp: , Rfl:   •  amLODIPine (NORVASC) 5 MG tablet, TK 1 T PO QD, Disp: , Rfl: 10  •  brimonidine-timolol (COMBIGAN) 0.2-0.5 % ophthalmic solution, COMBIGORN, Disp: , Rfl:   •  Calcium Citrate-Vitamin D (CALCIUM + D PO), Take 1,200 mg by mouth Daily., Disp: , Rfl:   •  celecoxib (CeleBREX) 50 MG capsule, , Disp: , Rfl:   •  Cranberry 450 MG tablet, Take 1 tablet by mouth Daily., Disp: , Rfl:   •  escitalopram (LEXAPRO) 20 MG tablet, Take 20 mg by mouth Daily., Disp: , Rfl:   •  esomeprazole (nexIUM) 20 MG capsule, Take 20 mg by mouth Every Morning Before Breakfast., Disp: , Rfl:   •  fluticasone (VERAMYST) 27.5 MCG/SPRAY nasal spray, 2 sprays into the nostril(s) as directed by provider Daily., Disp: , Rfl:   •  ibuprofen (ADVIL,MOTRIN) 800 MG tablet, , Disp: , Rfl:   •  latanoprost (XALATAN) 0.005 % ophthalmic solution, LATANOPROST 0.005 % SOLN, Disp: , Rfl:   •  losartan (COZAAR) 50 MG tablet, , Disp: , Rfl:   •  omeprazole (priLOSEC) 20 MG capsule, Take 20 mg by mouth Daily., Disp: , Rfl:   •  SYNTHROID 75 MCG tablet, Take 75 mcg by mouth Daily., Disp: , Rfl:     No Known Allergies    Family History   Problem Relation Age of Onset   • Heart disease Father    • Ovarian cancer Sister    • Colon polyps Sister    • Colon cancer Brother    • Colon polyps Brother        Past Surgical History:   Procedure Laterality Date   • APPENDECTOMY N/A 1985    Dr. Adame   • BLADDER REPAIR  04/2019   • BREAST BIOPSY Left 2012    BENIGN   • CATARACT EXTRACTION  2008    Dr. Shaffer   • COLONOSCOPY N/A 2014   • RECTAL PROLAPSE REPAIR  04/2019   • THYROIDECTOMY, PARTIAL  2002   • UPPER GASTROINTESTINAL ENDOSCOPY N/A 2017    Dr. Huerta       Past Medical History:   Diagnosis Date   • Anemia    • Anxiety    • Arthritis    • Depression    • GERD (gastroesophageal reflux disease)    • Hypertension    • Pulmonary hypertension 3/19/2018   • Raynauds disease    • Shortness of breath  2016   • Thyroid disorder        Family History   Problem Relation Age of Onset   • Heart disease Father    • Ovarian cancer Sister    • Colon polyps Sister    • Colon cancer Brother    • Colon polyps Brother        Social History     Socioeconomic History   • Marital status:    Tobacco Use   • Smoking status: Former     Types: Cigarettes     Quit date:      Years since quittin.2   • Smokeless tobacco: Never   Vaping Use   • Vaping Use: Never used   Substance and Sexual Activity   • Alcohol use: No   • Drug use: Never   • Sexual activity: Defer         Procedures      Objective:       Physical Exam    There were no vitals taken for this visit.  The patient is alert, oriented and in no distress.    Vital signs as noted above.    Head and neck revealed no carotid bruits or jugular venous distension.  No thyromegaly or lymphadenopathy is present.    Lungs clear.  No wheezing.  Breath sounds are normal bilaterally.    Heart normal first and second heart sounds.  No murmur..  No pericardial rub is present.  No gallop is present.    Abdomen soft and nontender.  No organomegaly is present.    Extremities revealed good peripheral pulses without any pedal edema.    Skin warm and dry.    Musculoskeletal system is grossly normal.    CNS grossly normal.    Reviewed and updated.

## 2023-04-18 ENCOUNTER — TRANSCRIBE ORDERS (OUTPATIENT)
Dept: ADMINISTRATIVE | Facility: HOSPITAL | Age: 83
End: 2023-04-18
Payer: MEDICARE

## 2023-04-18 DIAGNOSIS — J43.1 PANLOBULAR EMPHYSEMA: Primary | ICD-10-CM

## 2023-04-19 ENCOUNTER — OFFICE VISIT (OUTPATIENT)
Dept: CARDIOLOGY | Facility: CLINIC | Age: 83
End: 2023-04-19
Payer: MEDICARE

## 2023-04-19 VITALS
BODY MASS INDEX: 25.4 KG/M2 | WEIGHT: 126 LBS | HEIGHT: 59 IN | HEART RATE: 58 BPM | DIASTOLIC BLOOD PRESSURE: 66 MMHG | SYSTOLIC BLOOD PRESSURE: 125 MMHG

## 2023-04-19 DIAGNOSIS — I27.20 PULMONARY HYPERTENSION: ICD-10-CM

## 2023-04-19 DIAGNOSIS — E03.9 HYPOTHYROIDISM (ACQUIRED): ICD-10-CM

## 2023-04-19 DIAGNOSIS — R06.02 SHORTNESS OF BREATH: ICD-10-CM

## 2023-04-19 DIAGNOSIS — I34.0 NONRHEUMATIC MITRAL VALVE REGURGITATION: Primary | ICD-10-CM

## 2023-04-19 PROCEDURE — 99214 OFFICE O/P EST MOD 30 MIN: CPT | Performed by: INTERNAL MEDICINE

## 2023-04-19 PROCEDURE — 3074F SYST BP LT 130 MM HG: CPT | Performed by: INTERNAL MEDICINE

## 2023-04-19 PROCEDURE — 3078F DIAST BP <80 MM HG: CPT | Performed by: INTERNAL MEDICINE

## 2023-04-19 PROCEDURE — 1160F RVW MEDS BY RX/DR IN RCRD: CPT | Performed by: INTERNAL MEDICINE

## 2023-04-19 PROCEDURE — 1159F MED LIST DOCD IN RCRD: CPT | Performed by: INTERNAL MEDICINE

## 2023-04-19 RX ORDER — BIMATOPROST 0.01 %
DROPS OPHTHALMIC (EYE)
COMMUNITY
Start: 2023-04-10

## 2023-04-19 RX ORDER — ALENDRONATE SODIUM 70 MG/1
TABLET ORAL
COMMUNITY
Start: 2023-04-12

## 2023-04-19 RX ORDER — MECLIZINE HCL 12.5 MG/1
TABLET ORAL
COMMUNITY
Start: 2023-03-06

## 2023-04-19 RX ORDER — OMEPRAZOLE 40 MG/1
CAPSULE, DELAYED RELEASE ORAL
COMMUNITY
Start: 2023-04-15

## 2023-04-24 ENCOUNTER — HOSPITAL ENCOUNTER (OUTPATIENT)
Dept: RESPIRATORY THERAPY | Facility: HOSPITAL | Age: 83
Discharge: HOME | End: 2023-04-24
Admitting: FAMILY MEDICINE
Payer: MEDICARE

## 2023-04-24 DIAGNOSIS — J43.1 PANLOBULAR EMPHYSEMA: ICD-10-CM

## 2023-04-24 PROCEDURE — 94799 UNLISTED PULMONARY SVC/PX: CPT

## 2023-04-24 PROCEDURE — 94618 PULMONARY STRESS TESTING: CPT

## 2023-04-24 NOTE — NURSING NOTE
Exercise Oximetry    Patient Name:Anitra Nobles   MRN: [id number]   Date: 04/24/23             ROOM AIR BASELINE   SpO2% 94   Heart Rate 56   Blood Pressure      EXERCISE ON ROOM AIR SpO2% EXERCISE ON O2 @  LPM SpO2%   1 MINUTE 94 1 MINUTE    2 MINUTES 92 2 MINUTES    3 MINUTES 91 3 MINUTES    4 MINUTES 90 4 MINUTES    5 MINUTES 91 5 MINUTES    6 MINUTES 90 6 MINUTES               Distance Walked   Distance Walked   Dyspnea (Rafa Scale)   Dyspnea (Rafa Scale)   Fatigue (Rafa Scale)   Fatigue (Rafa Scale)   SpO2% Post Exercise  94 SpO2% Post Exercise   HR Post Exercise  60 HR Post Exercise   Time to Recovery  1-2 MINUTES Time to Recovery     Comments: PATIENT WALKED ON ROOM AIR, O2 SATS REMAINED 90% AND ABOVE THRU-OUT THE WALK.

## 2023-04-30 NOTE — PROGRESS NOTES
Encounter Date:05/01/2023    Last seen 4/19/2023      Patient ID: Anitra Nobles is a 82 y.o. female.    Chief Complaint:    Mitral regurgitation  History of shortness of breath  Hypothyroidism     History of Present Illness  Patient recently was seen with following history.  Reviewed and updated.    Lately patient has been having increased shortness of breath.     Since I have last seen, the patient has been without any chest discomfort palpitations, dizziness or syncope.  Denies having any headache ,abdominal pain ,nausea, vomiting , diarrhea constipation, loss of weight or loss of appetite.  Denies having any excessive bruising ,hematuria or blood in the stool.     Review of all systems negative except as indicated.     Reviewed ROS.  Assessment and Plan            /////////////////////////////    Impression  ====================   -Shortness of breath     - history of 2 to 3+ mitral regurgitation.    Lexiscan Cardiolite test-normal-5/1/2023    Echocardiogram 5/1/2023 revealed  Left atrial enlargement  Mitral annular calcification  Moderate aortic valve stenosis with gradient of 22/12 mmHg and valve area of 1.6 cm².  Normal left ventricular function.    Echocardiogram 10/13/2021 revealed  Mitral annular calcification.  Moderate mitral regurgitation  Mild tricuspid regurgitation  Pulmonary hypertension with pulmonary artery pressure of 56 mmHg.  Moderate aortic valve stenosis  Biatrial enlargement  Normal left ventricular size and contractility with ejection fraction of 60%.     Cardiac catheterization 03/23/2018 revealed normal coronary arteries.  No evidence for pulmonary hypertension.  Two to 3+ mitral regurgitation normal left ventricular function.     Fantasma 03/23/2018 revealed left atrial enlargement moderate mitral and tricuspid regurgitation mild aortic valve stenosis and pulmonary artery pressure of 50 mm of mercury.     Echocardiogram 02/12/2018 showed normal left ventricular function significant  pulmonary hypertension with pulmonary artery pressure of 60 mm of mercury.  Mild aortic valve stenosis.  Left atrial enlargement.     -anemia     -anxiety depression     -History of esophageal stricture.     - status post partial thyroidectomy ganglionectomy and appendectomy     -former smoker     -hypothyroidism     -family history of coronary artery disease     - history of Raynaud's phenomena  ====================   Plan  ================  Increased shortness of breath lately.     Mitral regurgitation     Pulmonary hypertension probably secondary to mitral regurgitation.     EKG showed sinus rhythm without any ischemic changes.-  10/17/2022     Lexiscan Cardiolite test-normal-5/1/2023    Echocardiogram 5/1/2023 revealed  Left atrial enlargement  Mitral annular calcification  Moderate aortic valve stenosis with gradient of 22/12 mmHg and valve area of 1.6 cm².  Normal left ventricular function.    Hypothyroidism-continue levothyroxine     Medications were reviewed and updated.     Followup in the office in 6 weeks.  Patient will be considered for QAMAR and right and left heart catheterization if patient has continued symptoms.    Further plan will depend on patient's progress.  //////////////////////////////////////////               Diagnosis Plan   1. Nonrheumatic mitral valve regurgitation        2. Shortness of breath        3. Pulmonary hypertension        4. Hypothyroidism (acquired)        LAB RESULTS (LAST 7 DAYS)    CBC        BMP        CMP         BNP        TROPONIN        CoAg        Creatinine Clearance  CrCl cannot be calculated (Patient's most recent lab result is older than the maximum 30 days allowed.).    ABG        Radiology  No radiology results for the last day                The following portions of the patient's history were reviewed and updated as appropriate: allergies, current medications, past family history, past medical history, past social history, past surgical history and problem  list.    Review of Systems   Constitutional: Negative for malaise/fatigue.   Cardiovascular: Negative for chest pain, leg swelling, palpitations and syncope.   Respiratory: Negative for shortness of breath.    Skin: Negative for rash.   Gastrointestinal: Negative for nausea and vomiting.   Neurological: Negative for dizziness, light-headedness and numbness.   All other systems reviewed and are negative.        Current Outpatient Medications:   •  Acetaminophen 500 MG capsule, Take  by mouth Every 6 (Six) Hours As Needed., Disp: , Rfl:   •  albuterol sulfate  (90 Base) MCG/ACT inhaler, , Disp: , Rfl:   •  alendronate (FOSAMAX) 70 MG tablet, , Disp: , Rfl:   •  amLODIPine (NORVASC) 5 MG tablet, TK 1 T PO QD, Disp: , Rfl: 10  •  brimonidine-timolol (COMBIGAN) 0.2-0.5 % ophthalmic solution, COMBIGORN, Disp: , Rfl:   •  Calcium Citrate-Vitamin D (CALCIUM + D PO), Take 1,200 mg by mouth Daily., Disp: , Rfl:   •  celecoxib (CeleBREX) 50 MG capsule, , Disp: , Rfl:   •  Cranberry 450 MG tablet, Take 1 tablet by mouth Daily., Disp: , Rfl:   •  escitalopram (LEXAPRO) 20 MG tablet, Take 1 tablet by mouth Daily., Disp: , Rfl:   •  fluticasone (VERAMYST) 27.5 MCG/SPRAY nasal spray, 2 sprays into the nostril(s) as directed by provider Daily., Disp: , Rfl:   •  ibuprofen (ADVIL,MOTRIN) 800 MG tablet, , Disp: , Rfl:   •  losartan (COZAAR) 50 MG tablet, , Disp: , Rfl:   •  Lumigan 0.01 % ophthalmic drops, , Disp: , Rfl:   •  meclizine (ANTIVERT) 12.5 MG tablet, , Disp: , Rfl:   •  omeprazole (priLOSEC) 40 MG capsule, , Disp: , Rfl:   •  SYNTHROID 75 MCG tablet, Take 1 tablet by mouth Daily., Disp: , Rfl:   No current facility-administered medications for this visit.    No Known Allergies    Family History   Problem Relation Age of Onset   • Heart disease Father    • Ovarian cancer Sister    • Colon polyps Sister    • Colon cancer Brother    • Colon polyps Brother        Past Surgical History:   Procedure Laterality Date   •  "APPENDECTOMY N/A     Dr. Adame   • BLADDER REPAIR  2019   • BREAST BIOPSY Left 2012    BENIGN   • CATARACT EXTRACTION      Dr. Shaffer   • COLONOSCOPY N/A    • RECTAL PROLAPSE REPAIR  2019   • THYROIDECTOMY, PARTIAL     • UPPER GASTROINTESTINAL ENDOSCOPY N/A     Dr. Huerta       Past Medical History:   Diagnosis Date   • Anemia    • Anxiety    • Arthritis    • Depression    • GERD (gastroesophageal reflux disease)    • Hypertension    • Pulmonary hypertension 3/19/2018   • Raynauds disease    • Shortness of breath 2016   • Thyroid disorder        Family History   Problem Relation Age of Onset   • Heart disease Father    • Ovarian cancer Sister    • Colon polyps Sister    • Colon cancer Brother    • Colon polyps Brother        Social History     Socioeconomic History   • Marital status:    Tobacco Use   • Smoking status: Former     Types: Cigarettes     Quit date:      Years since quittin.3     Passive exposure: Past   • Smokeless tobacco: Never   Vaping Use   • Vaping Use: Never used   Substance and Sexual Activity   • Alcohol use: No   • Drug use: Never   • Sexual activity: Defer         Procedures      Objective:       Physical Exam    /70   Pulse 77   Ht 149.9 cm (59\")   Wt 57.2 kg (126 lb)   BMI 25.45 kg/m²   The patient is alert, oriented and in no distress.    Vital signs as noted above.    Head and neck revealed no carotid bruits or jugular venous distension.  No thyromegaly or lymphadenopathy is present.    Lungs clear.  No wheezing.  Breath sounds are normal bilaterally.    Heart normal first and second heart sounds.  Grade 2/6 systolic murmur..  No pericardial rub is present.  No gallop is present.    Abdomen soft and nontender.  No organomegaly is present.    Extremities revealed good peripheral pulses without any pedal edema.    Skin warm and dry.    Musculoskeletal system is grossly normal.    CNS grossly normal.    Reviewed and updated.        "

## 2023-05-01 ENCOUNTER — HOSPITAL ENCOUNTER (OUTPATIENT)
Dept: CARDIOLOGY | Facility: HOSPITAL | Age: 83
Discharge: HOME | End: 2023-05-01
Payer: MEDICARE

## 2023-05-01 ENCOUNTER — OFFICE VISIT (OUTPATIENT)
Dept: CARDIOLOGY | Facility: CLINIC | Age: 83
End: 2023-05-01
Payer: MEDICARE

## 2023-05-01 VITALS
HEIGHT: 59 IN | SYSTOLIC BLOOD PRESSURE: 124 MMHG | DIASTOLIC BLOOD PRESSURE: 70 MMHG | WEIGHT: 126 LBS | BODY MASS INDEX: 25.4 KG/M2 | HEART RATE: 77 BPM

## 2023-05-01 DIAGNOSIS — I34.0 NONRHEUMATIC MITRAL VALVE REGURGITATION: ICD-10-CM

## 2023-05-01 DIAGNOSIS — E03.9 HYPOTHYROIDISM (ACQUIRED): ICD-10-CM

## 2023-05-01 DIAGNOSIS — I27.20 PULMONARY HYPERTENSION: ICD-10-CM

## 2023-05-01 DIAGNOSIS — R06.02 SHORTNESS OF BREATH: ICD-10-CM

## 2023-05-01 DIAGNOSIS — I34.0 NONRHEUMATIC MITRAL VALVE REGURGITATION: Primary | ICD-10-CM

## 2023-05-01 LAB
BH CV ECHO MEAS - ACS: 0.91 CM
BH CV ECHO MEAS - AO MAX PG: 16.9 MMHG
BH CV ECHO MEAS - AO MEAN PG: 13.2 MMHG
BH CV ECHO MEAS - AO ROOT DIAM: 3.2 CM
BH CV ECHO MEAS - AO V2 MAX: 196.2 CM/SEC
BH CV ECHO MEAS - AO V2 VTI: 50.5 CM
BH CV ECHO MEAS - AVA(I,D): 1.59 CM2
BH CV ECHO MEAS - EDV(CUBED): 55.7 ML
BH CV ECHO MEAS - EDV(MOD-SP4): 32 ML
BH CV ECHO MEAS - EF(MOD-BP): 69 %
BH CV ECHO MEAS - EF(MOD-SP4): 69.2 %
BH CV ECHO MEAS - ESV(CUBED): 17.1 ML
BH CV ECHO MEAS - ESV(MOD-SP4): 9.8 ML
BH CV ECHO MEAS - FS: 32.6 %
BH CV ECHO MEAS - IVS/LVPW: 0.58 CM
BH CV ECHO MEAS - IVSD: 0.76 CM
BH CV ECHO MEAS - LA DIMENSION: 4.3 CM
BH CV ECHO MEAS - LV DIASTOLIC VOL/BSA (35-75): 21.1 CM2
BH CV ECHO MEAS - LV MASS(C)D: 125.1 GRAMS
BH CV ECHO MEAS - LV MAX PG: 6.8 MMHG
BH CV ECHO MEAS - LV MEAN PG: 3.4 MMHG
BH CV ECHO MEAS - LV SYSTOLIC VOL/BSA (12-30): 6.5 CM2
BH CV ECHO MEAS - LV V1 MAX: 130.4 CM/SEC
BH CV ECHO MEAS - LV V1 VTI: 27 CM
BH CV ECHO MEAS - LVIDD: 3.8 CM
BH CV ECHO MEAS - LVIDS: 2.6 CM
BH CV ECHO MEAS - LVOT AREA: 3 CM2
BH CV ECHO MEAS - LVOT DIAM: 1.95 CM
BH CV ECHO MEAS - LVPWD: 1.32 CM
BH CV ECHO MEAS - MR MAX PG: 127.6 MMHG
BH CV ECHO MEAS - MR MAX VEL: 564.7 CM/SEC
BH CV ECHO MEAS - MV A MAX VEL: 90.7 CM/SEC
BH CV ECHO MEAS - MV DEC SLOPE: 382.6 CM/SEC2
BH CV ECHO MEAS - MV DEC TIME: 0.24 MSEC
BH CV ECHO MEAS - MV E MAX VEL: 90.7 CM/SEC
BH CV ECHO MEAS - MV E/A: 1
BH CV ECHO MEAS - MV MAX PG: 5.2 MMHG
BH CV ECHO MEAS - MV MEAN PG: 2.06 MMHG
BH CV ECHO MEAS - MV V2 VTI: 29.6 CM
BH CV ECHO MEAS - MVA(VTI): 2.7 CM2
BH CV ECHO MEAS - PA ACC TIME: 0.11 SEC
BH CV ECHO MEAS - PA PR(ACCEL): 27.6 MMHG
BH CV ECHO MEAS - PA V2 MAX: 101.6 CM/SEC
BH CV ECHO MEAS - PI END-D VEL: 118.6 CM/SEC
BH CV ECHO MEAS - PULM A REVS DUR: 0.1 SEC
BH CV ECHO MEAS - PULM A REVS VEL: 32 CM/SEC
BH CV ECHO MEAS - PULM DIAS VEL: 32.9 CM/SEC
BH CV ECHO MEAS - PULM S/D: 1.52
BH CV ECHO MEAS - PULM SYS VEL: 50.1 CM/SEC
BH CV ECHO MEAS - RV MAX PG: 1.64 MMHG
BH CV ECHO MEAS - RV V1 MAX: 64.1 CM/SEC
BH CV ECHO MEAS - RV V1 VTI: 15.3 CM
BH CV ECHO MEAS - RVDD: 2.8 CM
BH CV ECHO MEAS - SI(MOD-SP4): 14.6 ML/M2
BH CV ECHO MEAS - SV(LVOT): 80.2 ML
BH CV ECHO MEAS - SV(MOD-SP4): 22.2 ML
BH CV ECHO MEAS - TR MAX PG: 67.8 MMHG
BH CV ECHO MEAS - TR MAX VEL: 411.3 CM/SEC
BH CV REST NUCLEAR ISOTOPE DOSE: 10.3 MCI
BH CV STRESS BP STAGE 1: NORMAL
BH CV STRESS COMMENTS STAGE 1: NORMAL
BH CV STRESS DOSE REGADENOSON STAGE 1: 0.4
BH CV STRESS DURATION MIN STAGE 1: 0
BH CV STRESS DURATION SEC STAGE 1: 10
BH CV STRESS HR STAGE 1: 73
BH CV STRESS NUCLEAR ISOTOPE DOSE: 32.5 MCI
BH CV STRESS PROTOCOL 1: NORMAL
BH CV STRESS RECOVERY BP: NORMAL MMHG
BH CV STRESS RECOVERY HR: 77 BPM
BH CV STRESS STAGE 1: 1
MAXIMAL PREDICTED HEART RATE: 139 BPM
MAXIMAL PREDICTED HEART RATE: 139 BPM
STRESS BASELINE BP: NORMAL MMHG
STRESS BASELINE HR: 73 BPM
STRESS TARGET HR: 118 BPM
STRESS TARGET HR: 118 BPM

## 2023-05-01 PROCEDURE — 3074F SYST BP LT 130 MM HG: CPT | Performed by: INTERNAL MEDICINE

## 2023-05-01 PROCEDURE — 78452 HT MUSCLE IMAGE SPECT MULT: CPT

## 2023-05-01 PROCEDURE — 0 TECHNETIUM SESTAMIBI: Performed by: INTERNAL MEDICINE

## 2023-05-01 PROCEDURE — 93306 TTE W/DOPPLER COMPLETE: CPT | Performed by: INTERNAL MEDICINE

## 2023-05-01 PROCEDURE — 3078F DIAST BP <80 MM HG: CPT | Performed by: INTERNAL MEDICINE

## 2023-05-01 PROCEDURE — 93017 CV STRESS TEST TRACING ONLY: CPT

## 2023-05-01 PROCEDURE — 93306 TTE W/DOPPLER COMPLETE: CPT

## 2023-05-01 PROCEDURE — 25010000002 REGADENOSON 0.4 MG/5ML SOLUTION: Performed by: INTERNAL MEDICINE

## 2023-05-01 PROCEDURE — 99214 OFFICE O/P EST MOD 30 MIN: CPT | Performed by: INTERNAL MEDICINE

## 2023-05-01 PROCEDURE — A9500 TC99M SESTAMIBI: HCPCS | Performed by: INTERNAL MEDICINE

## 2023-05-01 PROCEDURE — 1160F RVW MEDS BY RX/DR IN RCRD: CPT | Performed by: INTERNAL MEDICINE

## 2023-05-01 PROCEDURE — 1159F MED LIST DOCD IN RCRD: CPT | Performed by: INTERNAL MEDICINE

## 2023-05-01 RX ORDER — REGADENOSON 0.08 MG/ML
0.4 INJECTION, SOLUTION INTRAVENOUS
Status: COMPLETED | OUTPATIENT
Start: 2023-05-01 | End: 2023-05-01

## 2023-05-01 RX ADMIN — TECHNETIUM TC 99M SESTAMIBI 1 DOSE: 1 INJECTION INTRAVENOUS at 12:29

## 2023-05-01 RX ADMIN — TECHNETIUM TC 99M SESTAMIBI 1 DOSE: 1 INJECTION INTRAVENOUS at 14:15

## 2023-05-01 RX ADMIN — REGADENOSON 0.4 MG: 0.08 INJECTION, SOLUTION INTRAVENOUS at 14:15

## 2023-06-29 ENCOUNTER — ANESTHESIA EVENT (OUTPATIENT)
Dept: CARDIOLOGY | Facility: HOSPITAL | Age: 83
End: 2023-06-29
Payer: MEDICARE

## 2023-06-30 ENCOUNTER — ANESTHESIA (OUTPATIENT)
Dept: CARDIOLOGY | Facility: HOSPITAL | Age: 83
End: 2023-06-30
Payer: MEDICARE

## 2023-06-30 ENCOUNTER — ON CAMPUS - OUTPATIENT (AMBULATORY)
Dept: URBAN - METROPOLITAN AREA HOSPITAL 85 | Facility: HOSPITAL | Age: 83
End: 2023-06-30

## 2023-06-30 DIAGNOSIS — T18.128A FOOD IN ESOPHAGUS CAUSING OTHER INJURY, INITIAL ENCOUNTER: ICD-10-CM

## 2023-06-30 DIAGNOSIS — K20.80 OTHER ESOPHAGITIS WITHOUT BLEEDING: ICD-10-CM

## 2023-06-30 DIAGNOSIS — R13.10 DYSPHAGIA, UNSPECIFIED: ICD-10-CM

## 2023-06-30 DIAGNOSIS — K22.89 OTHER SPECIFIED DISEASE OF ESOPHAGUS: ICD-10-CM

## 2023-06-30 DIAGNOSIS — K44.9 DIAPHRAGMATIC HERNIA WITHOUT OBSTRUCTION OR GANGRENE: ICD-10-CM

## 2023-06-30 DIAGNOSIS — K31.7 POLYP OF STOMACH AND DUODENUM: ICD-10-CM

## 2023-06-30 PROCEDURE — 25010000002 PHENYLEPHRINE 10 MG/ML SOLUTION: Performed by: NURSE ANESTHETIST, CERTIFIED REGISTERED

## 2023-06-30 PROCEDURE — 43235 EGD DIAGNOSTIC BRUSH WASH: CPT | Performed by: INTERNAL MEDICINE

## 2023-06-30 PROCEDURE — 25010000002 PROPOFOL 200 MG/20ML EMULSION: Performed by: NURSE ANESTHETIST, CERTIFIED REGISTERED

## 2023-06-30 RX ORDER — PHENYLEPHRINE HYDROCHLORIDE 10 MG/ML
INJECTION INTRAVENOUS AS NEEDED
Status: DISCONTINUED | OUTPATIENT
Start: 2023-06-30 | End: 2023-06-30 | Stop reason: SURG

## 2023-06-30 RX ORDER — SODIUM CHLORIDE 9 MG/ML
INJECTION, SOLUTION INTRAVENOUS CONTINUOUS PRN
Status: DISCONTINUED | OUTPATIENT
Start: 2023-06-30 | End: 2023-06-30 | Stop reason: SURG

## 2023-06-30 RX ORDER — PROPOFOL 10 MG/ML
INJECTION, EMULSION INTRAVENOUS AS NEEDED
Status: DISCONTINUED | OUTPATIENT
Start: 2023-06-30 | End: 2023-06-30 | Stop reason: SURG

## 2023-06-30 RX ORDER — LIDOCAINE HYDROCHLORIDE 20 MG/ML
INJECTION, SOLUTION EPIDURAL; INFILTRATION; INTRACAUDAL; PERINEURAL AS NEEDED
Status: DISCONTINUED | OUTPATIENT
Start: 2023-06-30 | End: 2023-06-30 | Stop reason: SURG

## 2023-06-30 RX ADMIN — PHENYLEPHRINE HYDROCHLORIDE 100 MCG: 10 INJECTION INTRAVENOUS at 09:24

## 2023-06-30 RX ADMIN — PROPOFOL 30 MG: 10 INJECTION, EMULSION INTRAVENOUS at 09:21

## 2023-06-30 RX ADMIN — PROPOFOL 10 MG: 10 INJECTION, EMULSION INTRAVENOUS at 08:30

## 2023-06-30 RX ADMIN — PHENYLEPHRINE HYDROCHLORIDE 100 MCG: 10 INJECTION INTRAVENOUS at 08:39

## 2023-06-30 RX ADMIN — PROPOFOL 20 MG: 10 INJECTION, EMULSION INTRAVENOUS at 09:17

## 2023-06-30 RX ADMIN — SODIUM CHLORIDE: 0.9 INJECTION, SOLUTION INTRAVENOUS at 08:23

## 2023-06-30 RX ADMIN — PROPOFOL 30 MG: 10 INJECTION, EMULSION INTRAVENOUS at 09:19

## 2023-06-30 RX ADMIN — PROPOFOL 50 MG: 10 INJECTION, EMULSION INTRAVENOUS at 08:25

## 2023-06-30 RX ADMIN — PROPOFOL 40 MG: 10 INJECTION, EMULSION INTRAVENOUS at 09:11

## 2023-06-30 RX ADMIN — PROPOFOL 20 MG: 10 INJECTION, EMULSION INTRAVENOUS at 08:28

## 2023-06-30 RX ADMIN — PHENYLEPHRINE HYDROCHLORIDE 200 MCG: 10 INJECTION INTRAVENOUS at 09:15

## 2023-06-30 RX ADMIN — PROPOFOL 20 MG: 10 INJECTION, EMULSION INTRAVENOUS at 09:14

## 2023-06-30 RX ADMIN — PROPOFOL 50 MG: 10 INJECTION, EMULSION INTRAVENOUS at 09:10

## 2023-06-30 RX ADMIN — LIDOCAINE HYDROCHLORIDE 100 MG: 20 INJECTION, SOLUTION EPIDURAL; INFILTRATION; INTRACAUDAL; PERINEURAL at 08:25

## 2023-06-30 RX ADMIN — PROPOFOL 30 MG: 10 INJECTION, EMULSION INTRAVENOUS at 08:26

## 2023-06-30 RX ADMIN — PROPOFOL 30 MG: 10 INJECTION, EMULSION INTRAVENOUS at 08:27

## 2023-06-30 RX ADMIN — PROPOFOL 30 MG: 10 INJECTION, EMULSION INTRAVENOUS at 09:12

## 2023-06-30 RX ADMIN — PHENYLEPHRINE HYDROCHLORIDE 100 MCG: 10 INJECTION INTRAVENOUS at 09:17

## 2023-06-30 NOTE — ANESTHESIA POSTPROCEDURE EVALUATION
Patient: Anitra Nobles    Procedure Summary     Date: 06/30/23 Room / Location: [facility]    Anesthesia Start: 0823 Anesthesia Stop: 0926    Procedure: ADULT TRANSESOPHAGEAL ECHO (QAMAR) W/ CONT IF NECESSARY PER PROTOCOL Diagnosis:       Nonrheumatic mitral valve regurgitation      Pulmonary hypertension      Hypothyroidism (acquired)      Shortness of breath      (Valvular Disease)    Scheduled Providers: Prerna Bird CRNA; Brian Malone MD Provider: Gilson Lynn MD    Anesthesia Type: general, MAC ASA Status: 4          Anesthesia Type: general, MAC    Vitals  Vitals Value Taken Time   BP 95/80 06/30/23 1400   Temp     Pulse 73 06/30/23 1400   Resp 12 06/30/23 1036   SpO2 95 % 06/30/23 1400           Post Anesthesia Care and Evaluation    Patient location during evaluation: bedside  Patient participation: complete - patient participated  Level of consciousness: awake  Pain scale: See nurse's notes for pain score.  Pain management: adequate    Airway patency: patent  Anesthetic complications: No anesthetic complications  PONV Status: none  Cardiovascular status: acceptable  Respiratory status: acceptable and spontaneous ventilation  Hydration status: acceptable    Comments: Patient seen and examined postoperatively; vital signs stable; SpO2 greater than or equal to 90%; cardiopulmonary status stable; nausea/vomiting adequately controlled; pain adequately controlled; no apparent anesthesia complications; patient discharged from anesthesia care when discharge criteria were met

## 2023-07-24 ENCOUNTER — OFFICE VISIT (OUTPATIENT)
Dept: CARDIOLOGY | Facility: CLINIC | Age: 83
End: 2023-07-24
Payer: MEDICARE

## 2023-07-24 VITALS
HEART RATE: 68 BPM | DIASTOLIC BLOOD PRESSURE: 72 MMHG | SYSTOLIC BLOOD PRESSURE: 131 MMHG | WEIGHT: 121 LBS | HEIGHT: 58 IN | BODY MASS INDEX: 25.4 KG/M2

## 2023-07-24 DIAGNOSIS — R06.02 SHORTNESS OF BREATH: ICD-10-CM

## 2023-07-24 DIAGNOSIS — I34.0 NONRHEUMATIC MITRAL VALVE REGURGITATION: Primary | ICD-10-CM

## 2023-07-24 DIAGNOSIS — I27.20 PULMONARY HYPERTENSION: ICD-10-CM

## 2023-07-24 DIAGNOSIS — E03.9 HYPOTHYROIDISM (ACQUIRED): ICD-10-CM

## 2023-07-24 PROBLEM — R93.1 ABNORMAL ECHOCARDIOGRAM: Status: ACTIVE | Noted: 2018-07-26

## 2023-07-24 PROCEDURE — 99214 OFFICE O/P EST MOD 30 MIN: CPT | Performed by: INTERNAL MEDICINE

## 2023-07-24 PROCEDURE — 3075F SYST BP GE 130 - 139MM HG: CPT | Performed by: INTERNAL MEDICINE

## 2023-07-24 PROCEDURE — 3078F DIAST BP <80 MM HG: CPT | Performed by: INTERNAL MEDICINE

## 2023-07-24 PROCEDURE — 1160F RVW MEDS BY RX/DR IN RCRD: CPT | Performed by: INTERNAL MEDICINE

## 2023-07-24 PROCEDURE — 1159F MED LIST DOCD IN RCRD: CPT | Performed by: INTERNAL MEDICINE

## 2023-07-24 NOTE — PROGRESS NOTES
Encounter Date:07/24/2023  Last seen 6/26/2023      Patient ID: Anitra Nobles is a 82 y.o. female.    Chief Complaint:  Mitral regurgitation  History of shortness of breath  Hypothyroidism  Large hiatal hernia    History of Present Illness    Patient has continued shortness of breath and chest heaviness.  Patient recently had attempted QAMAR but unsuccessful due to severe tortuosity of the esophagus and large hiatal hernia.     Since I have last seen, the patient has been without any chest discomfort , palpitations, dizziness or syncope.  Denies having any headache ,abdominal pain ,nausea, vomiting , diarrhea constipation, loss of weight or loss of appetite.  Denies having any excessive bruising ,hematuria or blood in the stool.     Review of all systems negative except as indicated.     Reviewed ROS.        Assessment and Plan         /////////////////////////////  History  ====================   -Shortness of breath and chest discomfort     - history of 2 to 3+ mitral regurgitation.     - Moderate aortic valve stenosis with gradient of 22/12 mmHg and valve area of 1.6 cm².  However at cardiac cath no gradient was noted across the aortic valve.    - Large hiatal hernia (see recent endoscopy)    Cardiac cath 6/30/2023  Pulmonary hypertension (pulmonary artery pressure 56/22 and mean pulmonary artery pressure 34.  Left ventricular size and contractility is normal with ejection fraction of 60%.  Significant mitral regurgitation is present.  No gradient was noted across the aortic valve.  Normal epicardial coronary arteries.     Lexiscan Cardiolite test-normal-5/1/2023     Echocardiogram 5/1/2023 revealed  Left atrial enlargement  Mitral annular calcification  Moderate aortic valve stenosis with gradient of 22/12 mmHg and valve area of 1.6 cm².  Normal left ventricular function.    Attempted QAMAR 6/30/2023-not successful due to large hiatal hernia and significant tortuosity of the esophagus.    Upper endoscopy results  as below.  Dr. Scooter Bailey.-6/30/2023  Impression:  1.  Esophagus was full of liquid and some semisolid food and was massively dilated with severe ulcerations and signs of stasis throughout the entire esophagus.  At 25 cm from the incisors, there is a very sharp turn at 90 degrees followed by another 90 degree turn indicating a very tortuous esophagus.  There is no stricture in the esophagus was widely patent into the stomach at 30 cm.  2.  Very large hiatal hernia with half of the stomach and the patient's chest.  There was food sitting in the hiatal hernia.  It was a very tight narrowing entering the distal stomach through the diaphragmatic hiatus which is likely why the hiatal hernia is pocketed with food  3.  Some larger gastric polyps with the appearance of hyperplastic polyps in the fundus were present and some in the hiatal hernia itself  4.  Normal duodenal mucosa visualized to D3     Recommendations:  Given the patient's shortness of breath and early satiety with changes of her esophagus, I would recommend surgery for her large hiatal hernia.  This will likely improve her respiratory status and her early satiety.  P.o. twice daily PPI  Follow-up in GI clinic    -anemia     -anxiety depression     -History of esophageal stricture.     - status post partial thyroidectomy ganglionectomy and appendectomy     -former smoker     -hypothyroidism     -family history of coronary artery disease     - history of Raynaud's phenomena  ====================   Plan  ================  Increased shortness of breath lately.  Chest discomfort.     Mitral regurgitation     Moderate aortic valve stenosis.  No gradient was noted across the aortic valve at cardiac catheterization.     Pulmonary hypertension probably secondary to mitral regurgitation.    Patient has significant shortness of breath.     Patient had attempted QAMAR prior to cardiac catheterization however patient has significant problems with dysphagia and GI  performed upper endoscopy that revealed severe tortuosity of the esophagus achalasia and severe hiatal hernia with half of the stomach in the chest cavity.  QAMAR was not performed.     Patient's symptoms could be from large hiatal hernia in combination with mitral regurgitation which appears to be significant on left ventricular angiogram although mild on transthoracic echocardiogram.  Performing QAMAR would be extremely difficult and risky due to upper endoscopy findings.     Patient has significant pulmonary hypertension likely from mitral regurgitation.     GI follow-up with Dr. Scooter Bailey or Dr. Huerta regarding large hiatal hernia and to discuss surgical options.    Hypothyroidism-continue levothyroxine     Medications were reviewed and updated.     Follow-up in the office in 3 months.     Further plan will depend on patient's progress.  //////////////////////////////////////////          Diagnosis Plan   1. Nonrheumatic mitral valve regurgitation        2. Hypothyroidism (acquired)        3. Pulmonary hypertension        4. Shortness of breath        LAB RESULTS (LAST 7 DAYS)    CBC        BMP        CMP         BNP        TROPONIN        CoAg        Creatinine Clearance  Estimated Creatinine Clearance: 65.9 mL/min (by C-G formula based on SCr of 0.58 mg/dL).    ABG        Radiology  No radiology results for the last day                The following portions of the patient's history were reviewed and updated as appropriate: allergies, current medications, past family history, past medical history, past social history, past surgical history, and problem list.    Review of Systems   Constitutional: Negative for malaise/fatigue.   Cardiovascular:  Negative for chest pain, leg swelling, palpitations and syncope.   Respiratory:  Negative for shortness of breath.    Skin:  Negative for rash.   Gastrointestinal:  Negative for nausea and vomiting.   Neurological:  Negative for dizziness, light-headedness and  numbness.   All other systems reviewed and are negative.      Current Outpatient Medications:     Acetaminophen 500 MG capsule, Take 1 capsule by mouth Every 6 (Six) Hours As Needed., Disp: , Rfl:     albuterol sulfate  (90 Base) MCG/ACT inhaler, Inhale 2 puffs Every 4 (Four) Hours As Needed., Disp: , Rfl:     alendronate (FOSAMAX) 70 MG tablet, Take 1 tablet by mouth Every 7 (Seven) Days., Disp: , Rfl:     brimonidine-timolol (COMBIGAN) 0.2-0.5 % ophthalmic solution, Administer 1 drop to both eyes Every 12 (Twelve) Hours., Disp: , Rfl:     budesonide (PULMICORT) 0.5 MG/2ML nebulizer solution, Take 2 mL by nebulization 2 (Two) Times a Day., Disp: , Rfl:     Calcium Citrate-Vitamin D (CALCIUM + D PO), Take 1,200 mg by mouth Daily., Disp: , Rfl:     Cranberry 450 MG tablet, Take 1 tablet by mouth Daily., Disp: , Rfl:     escitalopram (LEXAPRO) 20 MG tablet, Take 1 tablet by mouth Daily., Disp: , Rfl:     formoterol (PERFOROMIST) 20 MCG/2ML nebulizer solution, Take 2 mL by nebulization 2 (Two) Times a Day., Disp: , Rfl:     losartan (COZAAR) 50 MG tablet, Take 1 tablet by mouth Daily., Disp: , Rfl:     Lumigan 0.01 % ophthalmic drops, Administer 1 drop to both eyes Every Night., Disp: , Rfl:     omeprazole (priLOSEC) 40 MG capsule, Take 1 capsule by mouth Daily., Disp: , Rfl:     revefenacin (Yupelri) 175 MCG/3ML nebulizer solution, Take 3 mL by nebulization Daily., Disp: , Rfl:     SYNTHROID 75 MCG tablet, Take 1 tablet by mouth Daily., Disp: , Rfl:     No Known Allergies    Family History   Problem Relation Age of Onset    Heart disease Father     Ovarian cancer Sister     Colon polyps Sister     Colon cancer Brother     Colon polyps Brother        Past Surgical History:   Procedure Laterality Date    APPENDECTOMY N/A 1985    Dr. Adame    BLADDER REPAIR  04/2019    BREAST BIOPSY Left 2012    BENIGN    CARDIAC CATHETERIZATION N/A 6/30/2023    Procedure: Right and Left Heart Cath with Coronar Angiography;   "Surgeon: Brian Malone MD;  Location: [facility] CATH INVASIVE LOCATION;  Service: Cardiovascular;  Laterality: N/A;    CATARACT EXTRACTION      Dr. Shaffer    COLONOSCOPY N/A     ENDOSCOPY N/A 2023    Procedure: ESOPHAGOGASTRODUODENOSCOPY with Dilation;  Surgeon: Scooter Bailey MD;  Location: [facility] ENDOSCOPY;  Service: Gastroenterology;  Laterality: N/A;  Post- Hiatal hernia, achalasia, food in esophagus    RECTAL PROLAPSE REPAIR  2019    THYROIDECTOMY, PARTIAL  2002    UPPER GASTROINTESTINAL ENDOSCOPY N/A     Dr. Huerta       Past Medical History:   Diagnosis Date    Anemia     Anxiety     Arthritis     Depression     GERD (gastroesophageal reflux disease)     Hypertension     Pulmonary hypertension 3/19/2018    Raynauds disease     Shortness of breath 2016    Thyroid disorder        Family History   Problem Relation Age of Onset    Heart disease Father     Ovarian cancer Sister     Colon polyps Sister     Colon cancer Brother     Colon polyps Brother        Social History     Socioeconomic History    Marital status:    Tobacco Use    Smoking status: Former     Types: Cigarettes     Quit date:      Years since quittin.5     Passive exposure: Past    Smokeless tobacco: Never   Vaping Use    Vaping Use: Never used   Substance and Sexual Activity    Alcohol use: Yes     Alcohol/week: 7.0 standard drinks     Types: 7 Glasses of wine per week    Drug use: Never    Sexual activity: Defer         Procedures      Objective:       Physical Exam    /72 (BP Location: Right arm, Patient Position: Sitting, Cuff Size: Adult)   Pulse 68   Ht 147.3 cm (58\")   Wt 54.9 kg (121 lb)   BMI 25.29 kg/m²   The patient is alert, oriented and in no distress.    Vital signs as noted above.    Head and neck revealed no carotid bruits or jugular venous distension.  No thyromegaly or lymphadenopathy is present.    Lungs clear.  No wheezing.  Breath sounds are normal bilaterally.    Heart " normal first and second heart sounds.  No murmur..  No pericardial rub is present.  No gallop is present.    Abdomen soft and nontender.  No organomegaly is present.    Extremities revealed good peripheral pulses without any pedal edema.    Skin warm and dry.    Musculoskeletal system-kyphoscoliosis.    CNS grossly normal.    Reviewed and updated.

## 2023-07-25 ENCOUNTER — OFFICE (AMBULATORY)
Dept: URBAN - METROPOLITAN AREA CLINIC 64 | Facility: CLINIC | Age: 83
End: 2023-07-25

## 2023-07-25 VITALS
SYSTOLIC BLOOD PRESSURE: 80 MMHG | DIASTOLIC BLOOD PRESSURE: 40 MMHG | HEIGHT: 59 IN | SYSTOLIC BLOOD PRESSURE: 94 MMHG | WEIGHT: 120 LBS | DIASTOLIC BLOOD PRESSURE: 53 MMHG | HEART RATE: 54 BPM

## 2023-07-25 DIAGNOSIS — R13.10 DYSPHAGIA, UNSPECIFIED: ICD-10-CM

## 2023-07-25 DIAGNOSIS — K44.9 DIAPHRAGMATIC HERNIA WITHOUT OBSTRUCTION OR GANGRENE: ICD-10-CM

## 2023-07-25 DIAGNOSIS — K22.0 ACHALASIA OF CARDIA: ICD-10-CM

## 2023-07-25 DIAGNOSIS — K21.9 GASTRO-ESOPHAGEAL REFLUX DISEASE WITHOUT ESOPHAGITIS: ICD-10-CM

## 2023-07-25 PROCEDURE — 99214 OFFICE O/P EST MOD 30 MIN: CPT | Performed by: INTERNAL MEDICINE

## 2023-07-26 ENCOUNTER — TRANSCRIBE ORDERS (OUTPATIENT)
Dept: ADMINISTRATIVE | Facility: HOSPITAL | Age: 83
End: 2023-07-26
Payer: MEDICARE

## 2023-07-26 DIAGNOSIS — K44.9 DIAPHRAGMATIC HERNIA WITHOUT OBSTRUCTION OR GANGRENE: ICD-10-CM

## 2023-07-26 DIAGNOSIS — K22.0 APERISTALSIS OF ESOPHAGUS: Primary | ICD-10-CM

## 2023-07-26 DIAGNOSIS — K21.9 GASTROESOPHAGEAL REFLUX DISEASE, UNSPECIFIED WHETHER ESOPHAGITIS PRESENT: ICD-10-CM

## 2023-07-26 DIAGNOSIS — R13.10 DYSPHAGIA, UNSPECIFIED TYPE: ICD-10-CM

## 2023-08-07 ENCOUNTER — HOSPITAL ENCOUNTER (OUTPATIENT)
Dept: GENERAL RADIOLOGY | Facility: HOSPITAL | Age: 83
Discharge: HOME | End: 2023-08-07
Admitting: INTERNAL MEDICINE
Payer: MEDICARE

## 2023-08-07 DIAGNOSIS — R13.10 DYSPHAGIA, UNSPECIFIED TYPE: ICD-10-CM

## 2023-08-07 DIAGNOSIS — K22.0 APERISTALSIS OF ESOPHAGUS: ICD-10-CM

## 2023-08-07 DIAGNOSIS — K44.9 DIAPHRAGMATIC HERNIA WITHOUT OBSTRUCTION OR GANGRENE: ICD-10-CM

## 2023-08-07 DIAGNOSIS — K21.9 GASTROESOPHAGEAL REFLUX DISEASE, UNSPECIFIED WHETHER ESOPHAGITIS PRESENT: ICD-10-CM

## 2023-08-07 PROCEDURE — A9270 NON-COVERED ITEM OR SERVICE: HCPCS | Performed by: INTERNAL MEDICINE

## 2023-08-07 PROCEDURE — 74220 X-RAY XM ESOPHAGUS 1CNTRST: CPT

## 2023-08-07 PROCEDURE — 63710000001 BARIUM SULFATE 98 % RECONSTITUTED SUSPENSION: Performed by: INTERNAL MEDICINE

## 2023-08-07 PROCEDURE — 63710000001 BARIUM SULFATE 700 MG TABLET: Performed by: INTERNAL MEDICINE

## 2023-08-07 RX ADMIN — BARIUM SULFATE 700 MG: 700 TABLET ORAL at 09:33

## 2023-08-07 RX ADMIN — BARIUM SULFATE 135 ML: 980 POWDER, FOR SUSPENSION ORAL at 09:33

## 2023-10-04 NOTE — PROGRESS NOTES
Encounter Date:10/31/2023  Last seen 7/24/2023      Patient ID: Anitra Nobles is a 83 y.o. female.    Chief Complaint:  Mitral regurgitation  History of shortness of breath  Hypothyroidism  Large hiatal hernia     History of Present Illness     Since I have last seen, the patient has been without any chest discomfort , unusual shortness of breath, palpitations, dizziness or syncope.  Denies having any headache ,abdominal pain ,nausea, vomiting , diarrhea constipation, loss of weight or loss of appetite.  Denies having any excessive bruising ,hematuria or blood in the stool.    Review of all systems negative except as indicated.    Reviewed ROS.     Assessment and Plan         /////////////////////////////  History  ====================   -Shortness of breath and chest discomfort     - history of 2 to 3+ mitral regurgitation.     - Moderate aortic valve stenosis with gradient of 22/12 mmHg and valve area of 1.6 cm².  However at cardiac cath no gradient was noted across the aortic valve.     - Large hiatal hernia (see recent endoscopy)     Cardiac cath 6/30/2023  Pulmonary hypertension (pulmonary artery pressure 56/22 and mean pulmonary artery pressure 34.  Left ventricular size and contractility is normal with ejection fraction of 60%.  Significant mitral regurgitation is present.  No gradient was noted across the aortic valve.  Normal epicardial coronary arteries.     Lexiscan Cardiolite test-normal-5/1/2023     Echocardiogram 5/1/2023 revealed  Left atrial enlargement  Mitral annular calcification  Moderate aortic valve stenosis with gradient of 22/12 mmHg and valve area of 1.6 cm².  Normal left ventricular function.     Attempted QAMAR 6/30/2023-not successful due to large hiatal hernia and significant tortuosity of the esophagus.     Upper endoscopy results as below.  Dr. Scooter Bailey.-6/30/2023  Impression:  1.  Esophagus was full of liquid and some semisolid food and was massively dilated with severe  ulcerations and signs of stasis throughout the entire esophagus.  At 25 cm from the incisors, there is a very sharp turn at 90 degrees followed by another 90 degree turn indicating a very tortuous esophagus.  There is no stricture in the esophagus was widely patent into the stomach at 30 cm.  2.  Very large hiatal hernia with half of the stomach and the patient's chest.  There was food sitting in the hiatal hernia.  It was a very tight narrowing entering the distal stomach through the diaphragmatic hiatus which is likely why the hiatal hernia is pocketed with food  3.  Some larger gastric polyps with the appearance of hyperplastic polyps in the fundus were present and some in the hiatal hernia itself  4.  Normal duodenal mucosa visualized to D3     Recommendations:  Given the patient's shortness of breath and early satiety with changes of her esophagus, I would recommend surgery for her large hiatal hernia.  This will likely improve her respiratory status and her early satiety.  P.o. twice daily PPI  Follow-up in GI clinic    Seen by Dr. Huerta.  Did not think patient would be a surgical candidate for hiatal hernia.     -anemia     -anxiety depression     -History of esophageal stricture.     - status post partial thyroidectomy ganglionectomy and appendectomy     -former smoker     -hypothyroidism     -family history of coronary artery disease     - history of Raynaud's phenomena  ====================   Plan  ================  Increased shortness of breath lately.  Chest discomfort.     Mitral regurgitation     Moderate aortic valve stenosis.  No gradient was noted across the aortic valve at cardiac catheterization.     Pulmonary hypertension probably secondary to mitral regurgitation.     Patient has significant shortness of breath.     Patient had attempted QAMAR prior to cardiac catheterization however patient has significant problems with dysphagia and GI performed upper endoscopy that revealed severe tortuosity  of the esophagus achalasia and severe hiatal hernia with half of the stomach in the chest cavity.  QAMAR was not performed.     Patient's symptoms could be from large hiatal hernia in combination with mitral regurgitation which appears to be significant on left ventricular angiogram although mild on transthoracic echocardiogram.  Performing QAMAR would be extremely difficult and risky due to upper endoscopy findings.     Patient has significant pulmonary hypertension likely from mitral regurgitation.     GI follow-up with Dr. Scooter Bailey or Dr. Huerta regarding large hiatal hernia and to discuss surgical options.  Apparently Dr. Huerta did not think patient would be a surgical candidate.     Hypothyroidism-continue levothyroxine     Medications were reviewed and updated.    Patient shortness of breath is multifactorial including pulmonary hypertension mitral regurgitation hiatal hernia significant kyphoscoliosis etc.    Pulmonary consultation.     Follow-up in the office in 6 months.     Further plan will depend on patient's progress.    Reviewed and updated-10/31/2023  //////////////////////////////////////////              Diagnosis Plan   1. Nonrheumatic mitral valve regurgitation        2. Hypothyroidism (acquired)        3. Pulmonary hypertension        4. Shortness of breath        LAB RESULTS (LAST 7 DAYS)    CBC        BMP        CMP         BNP        TROPONIN        CoAg        Creatinine Clearance  CrCl cannot be calculated (Patient's most recent lab result is older than the maximum 30 days allowed.).    ABG        Radiology  No radiology results for the last day                The following portions of the patient's history were reviewed and updated as appropriate: allergies, current medications, past family history, past medical history, past social history, past surgical history, and problem list.    Review of Systems   Constitutional: Negative for malaise/fatigue.   Cardiovascular:  Negative for  chest pain, leg swelling, palpitations and syncope.   Respiratory:  Negative for shortness of breath.    Skin:  Negative for rash.   Gastrointestinal:  Negative for nausea and vomiting.   Neurological:  Negative for dizziness, light-headedness and numbness.   All other systems reviewed and are negative.        Current Outpatient Medications:     albuterol sulfate  (90 Base) MCG/ACT inhaler, Inhale 2 puffs Every 4 (Four) Hours As Needed., Disp: , Rfl:     alendronate (FOSAMAX) 70 MG tablet, Take 1 tablet by mouth Every 7 (Seven) Days., Disp: , Rfl:     brimonidine-timolol (COMBIGAN) 0.2-0.5 % ophthalmic solution, Administer 1 drop to both eyes Every 12 (Twelve) Hours., Disp: , Rfl:     budesonide (PULMICORT) 0.5 MG/2ML nebulizer solution, Take 2 mL by nebulization 2 (Two) Times a Day., Disp: , Rfl:     Calcium Citrate-Vitamin D (CALCIUM + D PO), Take 1,200 mg by mouth Daily., Disp: , Rfl:     Cranberry 450 MG tablet, Take 1 tablet by mouth Daily., Disp: , Rfl:     escitalopram (LEXAPRO) 20 MG tablet, Take 1 tablet by mouth Daily., Disp: , Rfl:     formoterol (PERFOROMIST) 20 MCG/2ML nebulizer solution, Take 2 mL by nebulization 2 (Two) Times a Day., Disp: , Rfl:     losartan (COZAAR) 50 MG tablet, Take 1 tablet by mouth Daily., Disp: , Rfl:     Lumigan 0.01 % ophthalmic drops, Administer 1 drop to both eyes Every Night., Disp: , Rfl:     O2 (OXYGEN), Inhale 3 L/min Continuous., Disp: , Rfl:     omeprazole (priLOSEC) 40 MG capsule, Take 1 capsule by mouth Daily., Disp: , Rfl:     revefenacin (Yupelri) 175 MCG/3ML nebulizer solution, Take 3 mL by nebulization Daily., Disp: , Rfl:     SYNTHROID 75 MCG tablet, Take 1 tablet by mouth Daily., Disp: , Rfl:     Acetaminophen 500 MG capsule, Take 1 capsule by mouth Every 6 (Six) Hours As Needed., Disp: , Rfl:     No Known Allergies    Family History   Problem Relation Age of Onset    Heart disease Father     Ovarian cancer Sister     Colon polyps Sister     Colon  "cancer Brother     Colon polyps Brother        Past Surgical History:   Procedure Laterality Date    APPENDECTOMY N/A     Dr. Adame    BLADDER REPAIR  2019    BREAST BIOPSY Left 2012    BENIGN    CARDIAC CATHETERIZATION N/A 2023    Procedure: Right and Left Heart Cath with Coronar Angiography;  Surgeon: Brian Malone MD;  Location: [facility] CATH INVASIVE LOCATION;  Service: Cardiovascular;  Laterality: N/A;    CATARACT EXTRACTION      Dr. Shaffer    COLONOSCOPY N/A     ENDOSCOPY N/A 2023    Procedure: ESOPHAGOGASTRODUODENOSCOPY with Dilation;  Surgeon: Scooter Bailey MD;  Location: [facility] ENDOSCOPY;  Service: Gastroenterology;  Laterality: N/A;  Post- Hiatal hernia, achalasia, food in esophagus    RECTAL PROLAPSE REPAIR  2019    THYROIDECTOMY, PARTIAL  2002    UPPER GASTROINTESTINAL ENDOSCOPY N/A     Dr. Huerta       Past Medical History:   Diagnosis Date    Anemia     Anxiety     Arthritis     Depression     GERD (gastroesophageal reflux disease)     Hypertension     Pulmonary hypertension 3/19/2018    Raynauds disease     Shortness of breath 2016    Thyroid disorder        Family History   Problem Relation Age of Onset    Heart disease Father     Ovarian cancer Sister     Colon polyps Sister     Colon cancer Brother     Colon polyps Brother        Social History     Socioeconomic History    Marital status:    Tobacco Use    Smoking status: Former     Types: Cigarettes     Quit date:      Years since quittin.8     Passive exposure: Past    Smokeless tobacco: Never   Vaping Use    Vaping Use: Never used   Substance and Sexual Activity    Alcohol use: Yes     Alcohol/week: 7.0 standard drinks of alcohol     Types: 7 Glasses of wine per week    Drug use: Never    Sexual activity: Defer         Procedures      Objective:       Physical Exam    /70 (BP Location: Right arm, Patient Position: Sitting, Cuff Size: Adult)   Pulse 64   Ht 147.3 cm (58\")   Wt 54 kg " (119 lb)   BMI 24.87 kg/m²   The patient is alert, oriented and in no distress.    Vital signs as noted above.    Head and neck revealed no carotid bruits or jugular venous distension.  No thyromegaly or lymphadenopathy is present.    Lungs clear.  No wheezing.  Breath sounds are normal bilaterally.    Heart normal first and second heart sounds.  Grade 2 or 6 systolic murmur..  No pericardial rub is present.  No gallop is present.    Abdomen soft and nontender.  No organomegaly is present.    Extremities revealed good peripheral pulses without any pedal edema.    Skin warm and dry.    Musculoskeletal system-significant kyphoscoliosis.    CNS grossly normal.    Reviewed and updated.

## 2023-10-19 ENCOUNTER — OFFICE (AMBULATORY)
Dept: URBAN - METROPOLITAN AREA CLINIC 64 | Facility: CLINIC | Age: 83
End: 2023-10-19

## 2023-10-19 VITALS
HEIGHT: 59 IN | SYSTOLIC BLOOD PRESSURE: 129 MMHG | HEART RATE: 56 BPM | DIASTOLIC BLOOD PRESSURE: 59 MMHG | WEIGHT: 120 LBS

## 2023-10-19 DIAGNOSIS — K44.9 DIAPHRAGMATIC HERNIA WITHOUT OBSTRUCTION OR GANGRENE: ICD-10-CM

## 2023-10-19 DIAGNOSIS — R13.10 DYSPHAGIA, UNSPECIFIED: ICD-10-CM

## 2023-10-19 DIAGNOSIS — K22.0 ACHALASIA OF CARDIA: ICD-10-CM

## 2023-10-19 PROCEDURE — 99213 OFFICE O/P EST LOW 20 MIN: CPT | Performed by: INTERNAL MEDICINE

## 2023-10-31 ENCOUNTER — OFFICE VISIT (OUTPATIENT)
Dept: CARDIOLOGY | Facility: CLINIC | Age: 83
End: 2023-10-31
Payer: MEDICARE

## 2023-10-31 VITALS
BODY MASS INDEX: 24.98 KG/M2 | HEART RATE: 64 BPM | DIASTOLIC BLOOD PRESSURE: 70 MMHG | HEIGHT: 58 IN | WEIGHT: 119 LBS | SYSTOLIC BLOOD PRESSURE: 124 MMHG

## 2023-10-31 DIAGNOSIS — I34.0 NONRHEUMATIC MITRAL VALVE REGURGITATION: Primary | ICD-10-CM

## 2023-10-31 DIAGNOSIS — E03.9 HYPOTHYROIDISM (ACQUIRED): ICD-10-CM

## 2023-10-31 DIAGNOSIS — R06.02 SHORTNESS OF BREATH: ICD-10-CM

## 2023-10-31 DIAGNOSIS — I27.20 PULMONARY HYPERTENSION: ICD-10-CM

## 2023-10-31 PROCEDURE — 99214 OFFICE O/P EST MOD 30 MIN: CPT | Performed by: INTERNAL MEDICINE

## 2023-10-31 PROCEDURE — 1159F MED LIST DOCD IN RCRD: CPT | Performed by: INTERNAL MEDICINE

## 2023-10-31 PROCEDURE — 3078F DIAST BP <80 MM HG: CPT | Performed by: INTERNAL MEDICINE

## 2023-10-31 PROCEDURE — 3074F SYST BP LT 130 MM HG: CPT | Performed by: INTERNAL MEDICINE

## 2023-10-31 PROCEDURE — 1160F RVW MEDS BY RX/DR IN RCRD: CPT | Performed by: INTERNAL MEDICINE

## 2023-11-21 ENCOUNTER — TELEPHONE (OUTPATIENT)
Dept: CARDIOLOGY | Facility: CLINIC | Age: 83
End: 2023-11-21
Payer: MEDICARE

## 2023-11-21 DIAGNOSIS — R06.02 SHORTNESS OF BREATH: Primary | ICD-10-CM

## 2023-11-21 NOTE — TELEPHONE ENCOUNTER
Caller: CORNELIO    Relationship: SELF    Best call back number: [phone]    What is the best time to reach you: ANY        What was the call regarding: PT NEEDS A REFERRAL SENT OVER TO DR. DUEÑAS, PULMONOLOGY, FOR THE LUNG ISSUES SHE DISCUSSED WITH DR. BRAY.  SHE STATED SHE CALLED THE OFFICE AND THE REFERRAL IS REQUIRED

## 2023-11-21 NOTE — TELEPHONE ENCOUNTER
Called and informed patient referral is in and Dr. Steel's office should reach out to schedule apt.   Understood

## 2023-11-30 ENCOUNTER — OFFICE VISIT (OUTPATIENT)
Dept: PULMONOLOGY | Facility: HOSPITAL | Age: 83
End: 2023-11-30
Payer: MEDICARE

## 2023-11-30 VITALS
DIASTOLIC BLOOD PRESSURE: 73 MMHG | SYSTOLIC BLOOD PRESSURE: 131 MMHG | WEIGHT: 119 LBS | HEIGHT: 58 IN | BODY MASS INDEX: 24.98 KG/M2

## 2023-11-30 DIAGNOSIS — K44.9 HIATAL HERNIA: ICD-10-CM

## 2023-11-30 DIAGNOSIS — I34.0 NONRHEUMATIC MITRAL VALVE REGURGITATION: ICD-10-CM

## 2023-11-30 DIAGNOSIS — J96.11 CHRONIC RESPIRATORY FAILURE WITH HYPOXIA: Primary | ICD-10-CM

## 2023-11-30 DIAGNOSIS — I27.20 PULMONARY HYPERTENSION: ICD-10-CM

## 2023-11-30 DIAGNOSIS — R10.13 DYSPEPSIA: ICD-10-CM

## 2023-11-30 DIAGNOSIS — I73.00 RAYNAUD'S DISEASE WITHOUT GANGRENE: ICD-10-CM

## 2023-11-30 PROCEDURE — G0463 HOSPITAL OUTPT CLINIC VISIT: HCPCS

## 2023-11-30 NOTE — PROGRESS NOTES
HPI:  New patient visit.  Patient reports progressive shortness of breath for the last 2 months even with the oxygen on.  She has mild dry cough.  She reports due to the hiatal hernia she takes small multiple meals, her appetite is not great, she lost few pounds in the last 6-month    Past Medical History:   Diagnosis Date    Anemia     Anxiety     Arthritis     Depression     GERD (gastroesophageal reflux disease)     Hypertension     Pulmonary hypertension 3/19/2018    Raynauds disease     Shortness of breath 7/20/2016    Thyroid disorder         Current Outpatient Medications on File Prior to Visit   Medication Sig Dispense Refill    Acetaminophen 500 MG capsule Take 1 capsule by mouth Every 6 (Six) Hours As Needed.      albuterol sulfate  (90 Base) MCG/ACT inhaler Inhale 2 puffs Every 4 (Four) Hours As Needed.      alendronate (FOSAMAX) 70 MG tablet Take 1 tablet by mouth Every 7 (Seven) Days.      brimonidine-timolol (COMBIGAN) 0.2-0.5 % ophthalmic solution Administer 1 drop to both eyes Every 12 (Twelve) Hours.      budesonide (PULMICORT) 0.5 MG/2ML nebulizer solution Take 2 mL by nebulization 2 (Two) Times a Day.      Calcium Citrate-Vitamin D (CALCIUM + D PO) Take 1,200 mg by mouth Daily.      Cranberry 450 MG tablet Take 1 tablet by mouth Daily.      escitalopram (LEXAPRO) 20 MG tablet Take 1 tablet by mouth Daily.      formoterol (PERFOROMIST) 20 MCG/2ML nebulizer solution Take 2 mL by nebulization 2 (Two) Times a Day.      losartan (COZAAR) 50 MG tablet Take 1 tablet by mouth Daily.      Lumigan 0.01 % ophthalmic drops Administer 1 drop to both eyes Every Night.      O2 (OXYGEN) Inhale 3 L/min Continuous.      omeprazole (priLOSEC) 40 MG capsule Take 1 capsule by mouth Daily.      revefenacin (Yupelri) 175 MCG/3ML nebulizer solution Take 3 mL by nebulization Daily.      SYNTHROID 75 MCG tablet Take 1 tablet by mouth Daily.       No current facility-administered medications on file prior to visit.  "       Social History     Tobacco Use    Smoking status: Former     Types: Cigarettes     Quit date:      Years since quittin.9     Passive exposure: Past    Smokeless tobacco: Never   Vaping Use    Vaping Use: Never used   Substance Use Topics    Alcohol use: Yes     Alcohol/week: 7.0 standard drinks of alcohol     Types: 7 Glasses of wine per week    Drug use: Never        Family History   Problem Relation Age of Onset    Heart disease Father     Ovarian cancer Sister     Colon polyps Sister     Colon cancer Brother     Colon polyps Brother         Review of system:  Constitutional: Negative for chills, fever and malaise/fatigue.   HENT: Negative.    Eyes: Negative.    Cardiovascular: Negative.    Respiratory: negative for cough and shortness of breath.    Skin: Negative.    Musculoskeletal: Raynaud's phenomena  Gastrointestinal: Negative.    Genitourinary: Negative.    Neurological: Negative.    Psychiatric/Behavioral: Negative.    Physical exam:  Blood pressure 131/73, height 147.3 cm (58\"), weight 54 kg (119 lb).    General Appearance:  Alert   HEENT:  Normocephalic, without obvious abnormality, Conjunctiva/corneas clear,.   Nares normal, no drainage     Neck:  Supple, symmetrical, trachea midline. No JVD.  Lungs /Chest wall:   good air entry Bilaterlly, respirations unlabored, symmetrical wall movement.     Heart:  Regular rate and rhythm, S1 S2 normal  Abdomen: Soft, non-tender, no masses, no organomegaly.    Extremities: No edema, no clubbing or cyanosis, digits are cold    No radiology results for the last 90 days.   Results for orders placed during the hospital encounter of 23    Adult Transesophageal Echo (QAMAR) W/ Cont if Necessary Per Protocol    Interpretation Summary  Date of procedure  2023    Indications  Mitral regurgitation  Aortic valve stenosis  Shortness of breath    Procedure performed  Transesophageal echocardiogram (attempted) and Doppler study    Procedure  Anesthesia was " provided by anesthesiologist with intravenous Diprivan.  An attempt was made to pass QAMAR probe but could not be advanced beyond 25 cm and patient has history of dysphagia and only gentle pressure was used but could not advance the QAMAR probe.  QAMAR was abandoned at this time and GI consultation for upper endoscopy was obtained.  Patient had upper endoscopy and revealed the following results.  Proceed with cardiac catheterization.      Impression:  1.  Esophagus was full of liquid and some semisolid food and was massively dilated with severe ulcerations and signs of stasis throughout the entire esophagus.  At 25 cm from the incisors, there is a very sharp turn at 90 degrees followed by another 90 degree turn indicating a very tortuous esophagus.  There is no stricture in the esophagus was widely patent into the stomach at 30 cm.  2.  Very large hiatal hernia with half of the stomach and the patient's chest.  There was food sitting in the hiatal hernia.  It was a very tight narrowing entering the distal stomach through the diaphragmatic hiatus which is likely why the hiatal hernia is pocketed with food  3.  Some larger gastric polyps with the appearance of hyperplastic polyps in the fundus were present and some in the hiatal hernia itself  4.  Normal duodenal mucosa visualized to D3      Recommendations:  Given the patient's shortness of breath and early satiety with changes of her esophagus, I would recommend surgery for her large hiatal hernia.  This will likely improve her respiratory status and her early satiety.  P.o. twice daily PPI  Follow-up in GI clinic      Scooter Bailey MD        Assessment and plan:  Shortness of breath on exertion  Hypoxemia: PFTs 2021 showed preserved lung volumes but DLCO was down to 52%  Pulmonary hypertension on cardiac catheter June 2022 PA pressure 56/22 with mean pressure of 34 and wedge pressure 14  Valvular heart disease, significant mitral regurgitation and moderate aortic  stenosis  Large hiatal hernia  Scoliosis  Former smoker: Patient smoked only for 10 years  Hypothyroidism  Raynaud's phenomena    Plan:  From the pulmonary standpoint patient is at high risk for cardiac/thoracic surgery, the pulmonary hypertension is likely due to valvular heart disease.  Recovery could be hampered by hypoxia, large hiatal hernia and scoliosis  Continue with oxygen 2 L at bedtime and as needed, patient is compliant and benefiting from treatment  Repeat PFTs  Patient currently on budesonide and Perforomist nebulizer, she is not sure if these are helping her symptoms, we will try to stop them and if she gets more short of breath to resume them again  Sildenafil is unlikely to benefit the pulmonary hypertension until the valvular disease has been corrected    Follow-up in 3 months      I personally reviewed the latest radiological study  Patient is advised to stay up-to-date on immunizations for flu, pneumococcal and COVID-19

## 2023-12-07 ENCOUNTER — HOSPITAL ENCOUNTER (OUTPATIENT)
Dept: RESPIRATORY THERAPY | Facility: HOSPITAL | Age: 83
Discharge: HOME | End: 2023-12-07
Payer: MEDICARE

## 2023-12-07 VITALS — OXYGEN SATURATION: 99 % | RESPIRATION RATE: 18 BRPM | HEART RATE: 61 BPM

## 2023-12-07 DIAGNOSIS — I27.20 PULMONARY HYPERTENSION: ICD-10-CM

## 2023-12-07 DIAGNOSIS — R10.13 DYSPEPSIA: ICD-10-CM

## 2023-12-07 PROCEDURE — A9270 NON-COVERED ITEM OR SERVICE: HCPCS | Performed by: INTERNAL MEDICINE

## 2023-12-07 PROCEDURE — 94727 GAS DIL/WSHOT DETER LNG VOL: CPT

## 2023-12-07 PROCEDURE — 94060 EVALUATION OF WHEEZING: CPT

## 2023-12-07 PROCEDURE — 94729 DIFFUSING CAPACITY: CPT

## 2023-12-07 PROCEDURE — 94664 DEMO&/EVAL PT USE INHALER: CPT

## 2023-12-07 PROCEDURE — 63710000001 ALBUTEROL SULFATE HFA 108 (90 BASE) MCG/ACT AEROSOL SOLUTION 6.7 G INHALER: Performed by: INTERNAL MEDICINE

## 2023-12-07 PROCEDURE — 94799 UNLISTED PULMONARY SVC/PX: CPT

## 2023-12-07 RX ORDER — ALBUTEROL SULFATE 90 UG/1
2 AEROSOL, METERED RESPIRATORY (INHALATION) ONCE
Status: COMPLETED | OUTPATIENT
Start: 2023-12-07 | End: 2023-12-07

## 2023-12-07 RX ADMIN — ALBUTEROL SULFATE 2 PUFF: 108 AEROSOL, METERED RESPIRATORY (INHALATION) at 15:27

## 2023-12-12 ENCOUNTER — TELEPHONE (OUTPATIENT)
Dept: PULMONOLOGY | Facility: HOSPITAL | Age: 83
End: 2023-12-12
Payer: MEDICARE

## 2023-12-12 NOTE — TELEPHONE ENCOUNTER
Pt had her PFT 12/7/23 and is asking if she would benefit from doing Pulmonary rehab. Pt has Pulm Htn and valvular heart disease. Pt aware PFT is not read yet and that Dr. Nolan will be in clinic next week to advise on rehab.

## 2023-12-18 DIAGNOSIS — I27.20 PULMONARY HYPERTENSION: Primary | ICD-10-CM

## 2023-12-20 ENCOUNTER — TELEPHONE (OUTPATIENT)
Dept: CARDIAC REHAB | Facility: HOSPITAL | Age: 83
End: 2023-12-20
Payer: MEDICARE

## 2024-01-03 ENCOUNTER — TELEPHONE (OUTPATIENT)
Dept: CARDIAC REHAB | Facility: HOSPITAL | Age: 84
End: 2024-01-03
Payer: MEDICARE

## 2024-01-03 NOTE — TELEPHONE ENCOUNTER
Called and left voicemail to remind patient of their initial appt on 12/5 @12:30p    Anjelica, RRT

## 2024-01-05 ENCOUNTER — OFFICE VISIT (OUTPATIENT)
Dept: CARDIAC REHAB | Facility: HOSPITAL | Age: 84
End: 2024-01-05
Payer: MEDICARE

## 2024-01-05 DIAGNOSIS — J43.9 PULMONARY EMPHYSEMA, UNSPECIFIED EMPHYSEMA TYPE: Primary | ICD-10-CM

## 2024-01-05 PROCEDURE — 94626 PHY/QHP OP PULM RHB W/MNTR: CPT

## 2024-01-05 NOTE — PROGRESS NOTES
Pulmonary rehab initial visit. 6min walk test completed on 3lpm Patient walked a total of 8laps/600ft and stopped 3times due to SOA. PHQ-9 pre-test score was 11. Please see session report.    Anjelica, RRT

## 2024-01-08 ENCOUNTER — TREATMENT (OUTPATIENT)
Dept: CARDIAC REHAB | Facility: HOSPITAL | Age: 84
End: 2024-01-08
Payer: MEDICARE

## 2024-01-08 DIAGNOSIS — J43.9 PULMONARY EMPHYSEMA, UNSPECIFIED EMPHYSEMA TYPE: Primary | ICD-10-CM

## 2024-01-08 PROCEDURE — 94625 PHY/QHP OP PULM RHB W/O MNTR: CPT

## 2024-01-11 ENCOUNTER — TREATMENT (OUTPATIENT)
Dept: CARDIAC REHAB | Facility: HOSPITAL | Age: 84
End: 2024-01-11
Payer: MEDICARE

## 2024-01-11 DIAGNOSIS — J43.9 PULMONARY EMPHYSEMA, UNSPECIFIED EMPHYSEMA TYPE: Primary | ICD-10-CM

## 2024-01-11 PROCEDURE — 94625 PHY/QHP OP PULM RHB W/O MNTR: CPT

## 2024-01-15 ENCOUNTER — APPOINTMENT (OUTPATIENT)
Dept: CARDIAC REHAB | Facility: HOSPITAL | Age: 84
End: 2024-01-15
Payer: MEDICARE

## 2024-01-18 ENCOUNTER — TREATMENT (OUTPATIENT)
Dept: CARDIAC REHAB | Facility: HOSPITAL | Age: 84
End: 2024-01-18
Payer: MEDICARE

## 2024-01-18 DIAGNOSIS — J43.9 PULMONARY EMPHYSEMA, UNSPECIFIED EMPHYSEMA TYPE: Primary | ICD-10-CM

## 2024-01-18 PROCEDURE — 94625 PHY/QHP OP PULM RHB W/O MNTR: CPT

## 2024-01-22 ENCOUNTER — TREATMENT (OUTPATIENT)
Dept: CARDIAC REHAB | Facility: HOSPITAL | Age: 84
End: 2024-01-22
Payer: MEDICARE

## 2024-01-22 DIAGNOSIS — J43.9 PULMONARY EMPHYSEMA, UNSPECIFIED EMPHYSEMA TYPE: Primary | ICD-10-CM

## 2024-01-22 PROCEDURE — G0238 OTH RESP PROC, INDIV: HCPCS

## 2024-01-22 PROCEDURE — G0237 THERAPEUTIC PROCD STRG ENDUR: HCPCS

## 2024-01-25 ENCOUNTER — TREATMENT (OUTPATIENT)
Dept: CARDIAC REHAB | Facility: HOSPITAL | Age: 84
End: 2024-01-25
Payer: MEDICARE

## 2024-01-25 DIAGNOSIS — J43.9 PULMONARY EMPHYSEMA, UNSPECIFIED EMPHYSEMA TYPE: Primary | ICD-10-CM

## 2024-01-25 PROCEDURE — 94625 PHY/QHP OP PULM RHB W/O MNTR: CPT

## 2024-01-29 ENCOUNTER — TREATMENT (OUTPATIENT)
Dept: CARDIAC REHAB | Facility: HOSPITAL | Age: 84
End: 2024-01-29
Payer: MEDICARE

## 2024-01-29 DIAGNOSIS — J43.9 PULMONARY EMPHYSEMA, UNSPECIFIED EMPHYSEMA TYPE: Primary | ICD-10-CM

## 2024-01-29 PROCEDURE — 94625 PHY/QHP OP PULM RHB W/O MNTR: CPT

## 2024-02-05 ENCOUNTER — TREATMENT (OUTPATIENT)
Dept: CARDIAC REHAB | Facility: HOSPITAL | Age: 84
End: 2024-02-05
Payer: MEDICARE

## 2024-02-05 DIAGNOSIS — J43.9 PULMONARY EMPHYSEMA, UNSPECIFIED EMPHYSEMA TYPE: Primary | ICD-10-CM

## 2024-02-05 PROCEDURE — 94625 PHY/QHP OP PULM RHB W/O MNTR: CPT

## 2024-02-08 ENCOUNTER — TREATMENT (OUTPATIENT)
Dept: CARDIAC REHAB | Facility: HOSPITAL | Age: 84
End: 2024-02-08
Payer: MEDICARE

## 2024-02-08 DIAGNOSIS — I27.20 PULMONARY HYPERTENSION: Primary | ICD-10-CM

## 2024-02-08 PROCEDURE — G0238 OTH RESP PROC, INDIV: HCPCS

## 2024-02-08 PROCEDURE — G0237 THERAPEUTIC PROCD STRG ENDUR: HCPCS

## 2024-02-12 ENCOUNTER — TREATMENT (OUTPATIENT)
Dept: CARDIAC REHAB | Facility: HOSPITAL | Age: 84
End: 2024-02-12
Payer: MEDICARE

## 2024-02-12 DIAGNOSIS — J43.9 PULMONARY EMPHYSEMA, UNSPECIFIED EMPHYSEMA TYPE: ICD-10-CM

## 2024-02-12 DIAGNOSIS — I27.20 PULMONARY HYPERTENSION: Primary | ICD-10-CM

## 2024-02-12 PROCEDURE — G0237 THERAPEUTIC PROCD STRG ENDUR: HCPCS

## 2024-02-12 PROCEDURE — G0238 OTH RESP PROC, INDIV: HCPCS

## 2024-02-15 ENCOUNTER — TREATMENT (OUTPATIENT)
Dept: CARDIAC REHAB | Facility: HOSPITAL | Age: 84
End: 2024-02-15
Payer: MEDICARE

## 2024-02-15 DIAGNOSIS — I27.20 PULMONARY HYPERTENSION: Primary | ICD-10-CM

## 2024-02-15 PROCEDURE — G0238 OTH RESP PROC, INDIV: HCPCS

## 2024-02-15 PROCEDURE — G0237 THERAPEUTIC PROCD STRG ENDUR: HCPCS

## 2024-02-19 ENCOUNTER — TREATMENT (OUTPATIENT)
Dept: CARDIAC REHAB | Facility: HOSPITAL | Age: 84
End: 2024-02-19
Payer: MEDICARE

## 2024-02-19 DIAGNOSIS — J43.9 PULMONARY EMPHYSEMA, UNSPECIFIED EMPHYSEMA TYPE: Primary | ICD-10-CM

## 2024-02-19 PROCEDURE — G0237 THERAPEUTIC PROCD STRG ENDUR: HCPCS

## 2024-02-19 PROCEDURE — G0238 OTH RESP PROC, INDIV: HCPCS

## 2024-02-22 ENCOUNTER — OFFICE VISIT (OUTPATIENT)
Dept: PULMONOLOGY | Facility: HOSPITAL | Age: 84
End: 2024-02-22
Payer: MEDICARE

## 2024-02-22 ENCOUNTER — APPOINTMENT (OUTPATIENT)
Dept: CARDIAC REHAB | Facility: HOSPITAL | Age: 84
End: 2024-02-22
Payer: MEDICARE

## 2024-02-22 VITALS
DIASTOLIC BLOOD PRESSURE: 71 MMHG | SYSTOLIC BLOOD PRESSURE: 161 MMHG | HEART RATE: 55 BPM | OXYGEN SATURATION: 55 % | BODY MASS INDEX: 23.09 KG/M2 | RESPIRATION RATE: 16 BRPM | HEIGHT: 58 IN | WEIGHT: 110 LBS

## 2024-02-22 DIAGNOSIS — J98.4 RESTRICTIVE LUNG DISEASE: ICD-10-CM

## 2024-02-22 DIAGNOSIS — J96.11 CHRONIC RESPIRATORY FAILURE WITH HYPOXIA: ICD-10-CM

## 2024-02-22 DIAGNOSIS — I34.0 NONRHEUMATIC MITRAL VALVE REGURGITATION: ICD-10-CM

## 2024-02-22 DIAGNOSIS — I27.20 PULMONARY HYPERTENSION: Primary | ICD-10-CM

## 2024-02-22 PROCEDURE — G0463 HOSPITAL OUTPT CLINIC VISIT: HCPCS

## 2024-02-22 RX ORDER — CHOLECALCIFEROL (VITAMIN D3) 25 MCG
1 TABLET,CHEWABLE ORAL DAILY
COMMUNITY

## 2024-02-22 RX ORDER — LEVOCETIRIZINE DIHYDROCHLORIDE 5 MG/1
5 TABLET, FILM COATED ORAL EVERY EVENING
COMMUNITY

## 2024-02-22 NOTE — PROGRESS NOTES
HPI:  Patient here for pulmonary follow-up.  She reports her breathing is little bit better now that she is doing pulmonary rehab.  She still losing weight although that she is eating multiple small meals.    Initially patient reports progressive shortness of breath for the last 2 months even with the oxygen on.  She has mild dry cough.  She reports due to the hiatal hernia she takes small multiple meals, her appetite is not great, she lost few pounds in the last 6-month      Past Medical History:   Diagnosis Date    Anemia     Anxiety     Arthritis     Depression     GERD (gastroesophageal reflux disease)     Hypertension     Pulmonary hypertension 3/19/2018    Raynauds disease     Shortness of breath 7/20/2016    Thyroid disorder         Current Outpatient Medications on File Prior to Visit   Medication Sig Dispense Refill    Acetaminophen 500 MG capsule Take 1 capsule by mouth Every 6 (Six) Hours As Needed.      albuterol sulfate  (90 Base) MCG/ACT inhaler Inhale 2 puffs Every 4 (Four) Hours As Needed.      alendronate (FOSAMAX) 70 MG tablet Take 1 tablet by mouth Every 7 (Seven) Days.      budesonide (PULMICORT) 0.5 MG/2ML nebulizer solution Take 2 mL by nebulization 2 (Two) Times a Day.      Calcium Citrate-Vitamin D (CALCIUM + D PO) Take 1,200 mg by mouth Daily.      Cranberry 450 MG tablet Take 1 tablet by mouth Daily.      escitalopram (LEXAPRO) 20 MG tablet Take 1 tablet by mouth Daily.      formoterol (PERFOROMIST) 20 MCG/2ML nebulizer solution Take 2 mL by nebulization 2 (Two) Times a Day.      levocetirizine (XYZAL) 5 MG tablet Take 1 tablet by mouth Every Evening.      losartan (COZAAR) 50 MG tablet Take 1 tablet by mouth Daily.      Lumigan 0.01 % ophthalmic drops Administer 1 drop to both eyes Every Night.      O2 (OXYGEN) Inhale 3 L/min Continuous.      omeprazole (priLOSEC) 40 MG capsule Take 1 capsule by mouth Daily.      revefenacin (Yupelri) 175 MCG/3ML nebulizer solution Take 3 mL by  "nebulization Daily.      SYNTHROID 75 MCG tablet Take 1 tablet by mouth Daily.      [DISCONTINUED] brimonidine-timolol (COMBIGAN) 0.2-0.5 % ophthalmic solution Administer 1 drop to both eyes Every 12 (Twelve) Hours.      Cyanocobalamin (B-12) 1000 MCG capsule Take 1 capsule by mouth Daily.       No current facility-administered medications on file prior to visit.        Social History     Tobacco Use    Smoking status: Former     Types: Cigarettes     Quit date:      Years since quittin.1     Passive exposure: Past    Smokeless tobacco: Never   Vaping Use    Vaping Use: Never used   Substance Use Topics    Alcohol use: Yes     Alcohol/week: 7.0 standard drinks of alcohol     Types: 7 Glasses of wine per week    Drug use: Never        Family History   Problem Relation Age of Onset    Heart disease Father     Ovarian cancer Sister     Colon polyps Sister     Colon cancer Brother     Colon polyps Brother         Review of system:  Constitutional: Negative for chills, fever and malaise/fatigue.   HENT: Negative.    Eyes: Negative.    Cardiovascular: Negative.    Respiratory: Positive for mild chronic exertional shortness of breath.    Skin: Negative.    Musculoskeletal: Scoliosis  Gastrointestinal: Negative.    Genitourinary: Negative.    Neurological: Negative.    Psychiatric/Behavioral: Negative.    Physical exam:  Blood pressure 161/71, pulse 55, resp. rate 16, height 147.3 cm (58\"), weight 49.9 kg (110 lb), SpO2 (!) 55%.    General Appearance:  Alert   HEENT:  Normocephalic, without obvious abnormality, Conjunctiva/corneas clear,.   Nares normal, no drainage     Neck:  Supple, symmetrical, trachea midline. No JVD.  Lungs /Chest wall:   good air entry Bilaterlly, respirations unlabored, symmetrical wall movement.     Heart:  Regular rate and rhythm, S1 S2 normal  Abdomen: Soft, non-tender, no masses, no organomegaly.    Extremities: No edema, no clubbing or cyanosis    No radiology results for the last 90 " days.   Results for orders placed during the hospital encounter of 06/30/23    Adult Transesophageal Echo (QAMAR) W/ Cont if Necessary Per Protocol    Interpretation Summary  Date of procedure  6/30/2023    Indications  Mitral regurgitation  Aortic valve stenosis  Shortness of breath    Procedure performed  Transesophageal echocardiogram (attempted) and Doppler study    Procedure  Anesthesia was provided by anesthesiologist with intravenous Diprivan.  An attempt was made to pass QAMAR probe but could not be advanced beyond 25 cm and patient has history of dysphagia and only gentle pressure was used but could not advance the QAMAR probe.  QAMAR was abandoned at this time and GI consultation for upper endoscopy was obtained.  Patient had upper endoscopy and revealed the following results.  Proceed with cardiac catheterization.      Impression:  1.  Esophagus was full of liquid and some semisolid food and was massively dilated with severe ulcerations and signs of stasis throughout the entire esophagus.  At 25 cm from the incisors, there is a very sharp turn at 90 degrees followed by another 90 degree turn indicating a very tortuous esophagus.  There is no stricture in the esophagus was widely patent into the stomach at 30 cm.  2.  Very large hiatal hernia with half of the stomach and the patient's chest.  There was food sitting in the hiatal hernia.  It was a very tight narrowing entering the distal stomach through the diaphragmatic hiatus which is likely why the hiatal hernia is pocketed with food  3.  Some larger gastric polyps with the appearance of hyperplastic polyps in the fundus were present and some in the hiatal hernia itself  4.  Normal duodenal mucosa visualized to D3      Recommendations:  Given the patient's shortness of breath and early satiety with changes of her esophagus, I would recommend surgery for her large hiatal hernia.  This will likely improve her respiratory status and her early satiety.  P.o. twice  daily PPI  Follow-up in GI clinic      Scooter Bailey MD  Cardiac catheter 2023  Pulmonary hypertension (pulmonary artery pressure 56/22 and mean pulmonary artery pressure 34.     Left ventricular size and contractility is normal with ejection fraction of 60%.  Significant mitral regurgitation is present.     No gradient was noted across the aortic valve.     Normal epicardial coronary arterie      Assessment and plan:  hortness of breath on exertion  Hypoxemia: PFTs December 2023 showed restrictive lung disease with decreased DLCO, FEV1/FVC 70%, FVC 84%, FEV1 82%, TLC 73%, DLCO 47%  PFTs 2021 showed preserved lung volumes but DLCO was down to 52%  Pulmonary hypertension on cardiac catheter June 2022 PA pressure 56/22 with mean pressure of 34 and wedge pressure 14  Valvular heart disease, significant mitral regurgitation and moderate aortic stenosis  Large hiatal hernia  Scoliosis  Former smoker: Patient smoked only for 10 years  Hypothyroidism  Raynaud's phenomena     Plan:  From the pulmonary standpoint patient is at high risk for cardiac/thoracic surgery, the pulmonary hypertension is likely due to valvular heart disease.  Recovery could be hampered by hypoxia, large hiatal hernia and scoliosis  Patient is benefiting from pulmonary rehab  Continue with oxygen 2 L at bedtime and as needed, patient is compliant and benefiting from treatment  Repeat CT scan of the chest without contrast  Patient currently on budesonide and Perforomist nebulizer, she is not sure if these are helping her symptoms, we will try to stop them and if she gets more short of breath to resume them again  Sildenafil is unlikely to benefit the pulmonary hypertension until the valvular disease has been corrected     Follow-up in 6 months       I personally reviewed the latest radiological study  Patient is advised to stay up-to-date on immunizations for flu, pneumococcal and COVID-19

## 2024-02-26 ENCOUNTER — TREATMENT (OUTPATIENT)
Dept: CARDIAC REHAB | Facility: HOSPITAL | Age: 84
End: 2024-02-26
Payer: MEDICARE

## 2024-02-26 DIAGNOSIS — J43.9 PULMONARY EMPHYSEMA, UNSPECIFIED EMPHYSEMA TYPE: Primary | ICD-10-CM

## 2024-02-26 PROCEDURE — G0238 OTH RESP PROC, INDIV: HCPCS

## 2024-02-26 PROCEDURE — G0237 THERAPEUTIC PROCD STRG ENDUR: HCPCS

## 2024-02-28 ENCOUNTER — TREATMENT (OUTPATIENT)
Dept: CARDIAC REHAB | Facility: HOSPITAL | Age: 84
End: 2024-02-28
Payer: MEDICARE

## 2024-02-28 DIAGNOSIS — J43.9 PULMONARY EMPHYSEMA, UNSPECIFIED EMPHYSEMA TYPE: Primary | ICD-10-CM

## 2024-02-28 DIAGNOSIS — I27.20 PULMONARY HYPERTENSION: ICD-10-CM

## 2024-02-28 PROCEDURE — G0238 OTH RESP PROC, INDIV: HCPCS

## 2024-02-28 PROCEDURE — G0237 THERAPEUTIC PROCD STRG ENDUR: HCPCS

## 2024-02-29 ENCOUNTER — APPOINTMENT (OUTPATIENT)
Dept: CARDIAC REHAB | Facility: HOSPITAL | Age: 84
End: 2024-02-29
Payer: MEDICARE

## 2024-03-04 ENCOUNTER — TREATMENT (OUTPATIENT)
Dept: CARDIAC REHAB | Facility: HOSPITAL | Age: 84
End: 2024-03-04
Payer: MEDICARE

## 2024-03-04 DIAGNOSIS — J43.9 PULMONARY EMPHYSEMA, UNSPECIFIED EMPHYSEMA TYPE: Primary | ICD-10-CM

## 2024-03-04 PROCEDURE — 94625 PHY/QHP OP PULM RHB W/O MNTR: CPT

## 2024-03-07 ENCOUNTER — APPOINTMENT (OUTPATIENT)
Dept: CARDIAC REHAB | Facility: HOSPITAL | Age: 84
End: 2024-03-07
Payer: MEDICARE

## 2024-03-11 ENCOUNTER — TREATMENT (OUTPATIENT)
Dept: CARDIAC REHAB | Facility: HOSPITAL | Age: 84
End: 2024-03-11
Payer: MEDICARE

## 2024-03-11 DIAGNOSIS — J43.9 PULMONARY EMPHYSEMA, UNSPECIFIED EMPHYSEMA TYPE: Primary | ICD-10-CM

## 2024-03-11 PROCEDURE — 94625 PHY/QHP OP PULM RHB W/O MNTR: CPT

## 2024-03-14 ENCOUNTER — HOSPITAL ENCOUNTER (OUTPATIENT)
Dept: CT IMAGING | Facility: HOSPITAL | Age: 84
Discharge: HOME | End: 2024-03-14
Admitting: INTERNAL MEDICINE
Payer: MEDICARE

## 2024-03-14 ENCOUNTER — TREATMENT (OUTPATIENT)
Dept: CARDIAC REHAB | Facility: HOSPITAL | Age: 84
End: 2024-03-14
Payer: MEDICARE

## 2024-03-14 DIAGNOSIS — J98.4 RESTRICTIVE LUNG DISEASE: ICD-10-CM

## 2024-03-14 DIAGNOSIS — J43.9 PULMONARY EMPHYSEMA, UNSPECIFIED EMPHYSEMA TYPE: Primary | ICD-10-CM

## 2024-03-14 PROCEDURE — 94625 PHY/QHP OP PULM RHB W/O MNTR: CPT

## 2024-03-14 PROCEDURE — 71250 CT THORAX DX C-: CPT

## 2024-03-18 ENCOUNTER — TREATMENT (OUTPATIENT)
Dept: CARDIAC REHAB | Facility: HOSPITAL | Age: 84
End: 2024-03-18
Payer: MEDICARE

## 2024-03-18 DIAGNOSIS — J43.9 PULMONARY EMPHYSEMA, UNSPECIFIED EMPHYSEMA TYPE: Primary | ICD-10-CM

## 2024-03-18 PROCEDURE — G0238 OTH RESP PROC, INDIV: HCPCS

## 2024-03-18 PROCEDURE — G0237 THERAPEUTIC PROCD STRG ENDUR: HCPCS

## 2024-03-21 ENCOUNTER — APPOINTMENT (OUTPATIENT)
Dept: CARDIAC REHAB | Facility: HOSPITAL | Age: 84
End: 2024-03-21
Payer: MEDICARE

## 2024-03-25 ENCOUNTER — APPOINTMENT (OUTPATIENT)
Dept: CARDIAC REHAB | Facility: HOSPITAL | Age: 84
End: 2024-03-25
Payer: MEDICARE

## 2024-03-25 ENCOUNTER — APPOINTMENT (OUTPATIENT)
Dept: CT IMAGING | Facility: HOSPITAL | Age: 84
End: 2024-03-25
Payer: MEDICARE

## 2024-03-25 ENCOUNTER — HOSPITAL ENCOUNTER (EMERGENCY)
Facility: HOSPITAL | Age: 84
Discharge: HOME | End: 2024-03-25
Attending: EMERGENCY MEDICINE | Admitting: EMERGENCY MEDICINE
Payer: MEDICARE

## 2024-03-25 VITALS
BODY MASS INDEX: 21.57 KG/M2 | HEIGHT: 59 IN | WEIGHT: 107 LBS | TEMPERATURE: 98 F | DIASTOLIC BLOOD PRESSURE: 73 MMHG | OXYGEN SATURATION: 99 % | HEART RATE: 82 BPM | RESPIRATION RATE: 17 BRPM | SYSTOLIC BLOOD PRESSURE: 151 MMHG

## 2024-03-25 DIAGNOSIS — R19.7 DIARRHEA, UNSPECIFIED TYPE: ICD-10-CM

## 2024-03-25 DIAGNOSIS — R11.0 NAUSEA: ICD-10-CM

## 2024-03-25 DIAGNOSIS — R10.84 GENERALIZED ABDOMINAL PAIN: Primary | ICD-10-CM

## 2024-03-25 LAB
ALBUMIN SERPL-MCNC: 4.4 G/DL (ref 3.5–5.2)
ALBUMIN/GLOB SERPL: 1.7 G/DL
ALP SERPL-CCNC: 85 U/L (ref 39–117)
ALT SERPL W P-5'-P-CCNC: 9 U/L (ref 1–33)
ANION GAP SERPL CALCULATED.3IONS-SCNC: 15 MMOL/L (ref 5–15)
AST SERPL-CCNC: 19 U/L (ref 1–32)
BASOPHILS # BLD AUTO: 0.01 10*3/MM3 (ref 0–0.2)
BASOPHILS NFR BLD AUTO: 0.2 % (ref 0–1.5)
BILIRUB SERPL-MCNC: 0.4 MG/DL (ref 0–1.2)
BILIRUB UR QL STRIP: NEGATIVE
BUN SERPL-MCNC: 9 MG/DL (ref 8–23)
BUN/CREAT SERPL: 16.7 (ref 7–25)
CALCIUM SPEC-SCNC: 9.8 MG/DL (ref 8.6–10.5)
CHLORIDE SERPL-SCNC: 98 MMOL/L (ref 98–107)
CLARITY UR: CLEAR
CO2 SERPL-SCNC: 22 MMOL/L (ref 22–29)
COLOR UR: YELLOW
CREAT SERPL-MCNC: 0.54 MG/DL (ref 0.57–1)
DEPRECATED RDW RBC AUTO: 50.4 FL (ref 37–54)
EGFRCR SERPLBLD CKD-EPI 2021: 92.1 ML/MIN/1.73
EOSINOPHIL # BLD AUTO: 0 10*3/MM3 (ref 0–0.4)
EOSINOPHIL NFR BLD AUTO: 0 % (ref 0.3–6.2)
ERYTHROCYTE [DISTWIDTH] IN BLOOD BY AUTOMATED COUNT: 13.9 % (ref 12.3–15.4)
GLOBULIN UR ELPH-MCNC: 2.6 GM/DL
GLUCOSE SERPL-MCNC: 158 MG/DL (ref 65–99)
GLUCOSE UR STRIP-MCNC: NEGATIVE MG/DL
HCT VFR BLD AUTO: 41.4 % (ref 34–46.6)
HGB BLD-MCNC: 13 G/DL (ref 12–15.9)
HGB UR QL STRIP.AUTO: NEGATIVE
IMM GRANULOCYTES # BLD AUTO: 0.02 10*3/MM3 (ref 0–0.05)
IMM GRANULOCYTES NFR BLD AUTO: 0.4 % (ref 0–0.5)
KETONES UR QL STRIP: ABNORMAL
LEUKOCYTE ESTERASE UR QL STRIP.AUTO: NEGATIVE
LIPASE SERPL-CCNC: 11 U/L (ref 13–60)
LYMPHOCYTES # BLD AUTO: 0.3 10*3/MM3 (ref 0.7–3.1)
LYMPHOCYTES NFR BLD AUTO: 5.5 % (ref 19.6–45.3)
MCH RBC QN AUTO: 30.9 PG (ref 26.6–33)
MCHC RBC AUTO-ENTMCNC: 31.4 G/DL (ref 31.5–35.7)
MCV RBC AUTO: 98.3 FL (ref 79–97)
MONOCYTES # BLD AUTO: 0.21 10*3/MM3 (ref 0.1–0.9)
MONOCYTES NFR BLD AUTO: 3.8 % (ref 5–12)
NEUTROPHILS NFR BLD AUTO: 4.95 10*3/MM3 (ref 1.7–7)
NEUTROPHILS NFR BLD AUTO: 90.1 % (ref 42.7–76)
NITRITE UR QL STRIP: NEGATIVE
NRBC BLD AUTO-RTO: 0 /100 WBC (ref 0–0.2)
PH UR STRIP.AUTO: 5.5 [PH] (ref 5–8)
PLATELET # BLD AUTO: 309 10*3/MM3 (ref 140–450)
PMV BLD AUTO: 9.1 FL (ref 6–12)
POTASSIUM SERPL-SCNC: 3.6 MMOL/L (ref 3.5–5.2)
PROT SERPL-MCNC: 7 G/DL (ref 6–8.5)
PROT UR QL STRIP: ABNORMAL
RBC # BLD AUTO: 4.21 10*6/MM3 (ref 3.77–5.28)
SODIUM SERPL-SCNC: 135 MMOL/L (ref 136–145)
SP GR UR STRIP: 1.02 (ref 1–1.03)
UROBILINOGEN UR QL STRIP: ABNORMAL
WBC NRBC COR # BLD AUTO: 5.49 10*3/MM3 (ref 3.4–10.8)

## 2024-03-25 PROCEDURE — 85025 COMPLETE CBC W/AUTO DIFF WBC: CPT | Performed by: EMERGENCY MEDICINE

## 2024-03-25 PROCEDURE — 25010000002 ONDANSETRON PER 1 MG: Performed by: EMERGENCY MEDICINE

## 2024-03-25 PROCEDURE — P9612 CATHETERIZE FOR URINE SPEC: HCPCS

## 2024-03-25 PROCEDURE — 83690 ASSAY OF LIPASE: CPT | Performed by: EMERGENCY MEDICINE

## 2024-03-25 PROCEDURE — 80053 COMPREHEN METABOLIC PANEL: CPT | Performed by: EMERGENCY MEDICINE

## 2024-03-25 PROCEDURE — 81003 URINALYSIS AUTO W/O SCOPE: CPT | Performed by: EMERGENCY MEDICINE

## 2024-03-25 PROCEDURE — 99284 EMERGENCY DEPT VISIT MOD MDM: CPT

## 2024-03-25 PROCEDURE — 74176 CT ABD & PELVIS W/O CONTRAST: CPT

## 2024-03-25 PROCEDURE — 96374 THER/PROPH/DIAG INJ IV PUSH: CPT

## 2024-03-25 PROCEDURE — 96375 TX/PRO/DX INJ NEW DRUG ADDON: CPT

## 2024-03-25 RX ORDER — ONDANSETRON 2 MG/ML
4 INJECTION INTRAMUSCULAR; INTRAVENOUS ONCE
Status: COMPLETED | OUTPATIENT
Start: 2024-03-25 | End: 2024-03-25

## 2024-03-25 RX ORDER — SODIUM CHLORIDE 0.9 % (FLUSH) 0.9 %
10 SYRINGE (ML) INJECTION AS NEEDED
Status: DISCONTINUED | OUTPATIENT
Start: 2024-03-25 | End: 2024-03-25 | Stop reason: HOSPADM

## 2024-03-25 RX ORDER — ONDANSETRON 4 MG/1
4 TABLET, ORALLY DISINTEGRATING ORAL EVERY 8 HOURS PRN
Qty: 12 TABLET | Refills: 0 | Status: SHIPPED | OUTPATIENT
Start: 2024-03-25

## 2024-03-25 RX ADMIN — ONDANSETRON 4 MG: 2 INJECTION INTRAMUSCULAR; INTRAVENOUS at 10:17

## 2024-03-25 RX ADMIN — ONDANSETRON 4 MG: 2 INJECTION INTRAMUSCULAR; INTRAVENOUS at 08:49

## 2024-03-25 NOTE — ED PROVIDER NOTES
Subjective   History of Present Illness  Patient is an 83-year-old female complaining of abdominal cramping for the past 12 hours with nausea and diarrhea.  She denies fever dysuria or other associated complaints.      Review of Systems    Past Medical History:   Diagnosis Date    Anemia     Anxiety     Arthritis     Depression     GERD (gastroesophageal reflux disease)     Hypertension     Pulmonary hypertension 3/19/2018    Raynauds disease     Shortness of breath 2016    Thyroid disorder        No Known Allergies    Past Surgical History:   Procedure Laterality Date    APPENDECTOMY N/A     Dr. Adame    BLADDER REPAIR  2019    BREAST BIOPSY Left 2012    BENIGN    CARDIAC CATHETERIZATION N/A 2023    Procedure: Right and Left Heart Cath with Coronar Angiography;  Surgeon: Brian Malone MD;  Location: [facility] CATH INVASIVE LOCATION;  Service: Cardiovascular;  Laterality: N/A;    CATARACT EXTRACTION      Dr. Shaffer    COLONOSCOPY N/A     ENDOSCOPY N/A 2023    Procedure: ESOPHAGOGASTRODUODENOSCOPY with Dilation;  Surgeon: Scooter Bailey MD;  Location: [facility] ENDOSCOPY;  Service: Gastroenterology;  Laterality: N/A;  Post- Hiatal hernia, achalasia, food in esophagus    RECTAL PROLAPSE REPAIR  2019    THYROIDECTOMY, PARTIAL  2002    UPPER GASTROINTESTINAL ENDOSCOPY N/A     Dr. Huerta       Family History   Problem Relation Age of Onset    Heart disease Father     Ovarian cancer Sister     Colon polyps Sister     Colon cancer Brother     Colon polyps Brother        Social History     Socioeconomic History    Marital status:    Tobacco Use    Smoking status: Former     Current packs/day: 0.00     Types: Cigarettes     Quit date:      Years since quittin.2     Passive exposure: Past    Smokeless tobacco: Never   Vaping Use    Vaping status: Never Used   Substance and Sexual Activity    Alcohol use: Yes     Alcohol/week: 7.0 standard drinks of alcohol     Types: 7  Glasses of wine per week    Drug use: Never    Sexual activity: Defer           Objective   Physical Exam  Neck has no adenopathy JVD or bruits.  Lungs are clear.  Heart has regular rate rhythm without murmur rub or gallop.  Chest nontender.  Abdomen soft with no tenderness.  Patient has normal bowel sounds.  Back has no CVA tenderness Permenter exam unremarkable.  Procedures           ED Course      Results for orders placed or performed during the hospital encounter of 03/25/24   Urinalysis With Microscopic If Indicated (No Culture) - Straight Cath    Specimen: Straight Cath; Urine   Result Value Ref Range    Color, UA Yellow Yellow, Straw    Appearance, UA Clear Clear    pH, UA 5.5 5.0 - 8.0    Specific Gravity, UA 1.020 1.005 - 1.030    Glucose, UA Negative Negative    Ketones, UA 80 mg/dL (3+) (A) Negative    Bilirubin, UA Negative Negative    Blood, UA Negative Negative    Protein, UA Trace (A) Negative    Leuk Esterase, UA Negative Negative    Nitrite, UA Negative Negative    Urobilinogen, UA 1.0 E.U./dL 0.2 - 1.0 E.U./dL   Comprehensive Metabolic Panel    Specimen: Blood   Result Value Ref Range    Glucose 158 (H) 65 - 99 mg/dL    BUN 9 8 - 23 mg/dL    Creatinine 0.54 (L) 0.57 - 1.00 mg/dL    Sodium 135 (L) 136 - 145 mmol/L    Potassium 3.6 3.5 - 5.2 mmol/L    Chloride 98 98 - 107 mmol/L    CO2 22.0 22.0 - 29.0 mmol/L    Calcium 9.8 8.6 - 10.5 mg/dL    Total Protein 7.0 6.0 - 8.5 g/dL    Albumin 4.4 3.5 - 5.2 g/dL    ALT (SGPT) 9 1 - 33 U/L    AST (SGOT) 19 1 - 32 U/L    Alkaline Phosphatase 85 39 - 117 U/L    Total Bilirubin 0.4 0.0 - 1.2 mg/dL    Globulin 2.6 gm/dL    A/G Ratio 1.7 g/dL    BUN/Creatinine Ratio 16.7 7.0 - 25.0    Anion Gap 15.0 5.0 - 15.0 mmol/L    eGFR 92.1 >60.0 mL/min/1.73   Lipase    Specimen: Blood   Result Value Ref Range    Lipase 11 (L) 13 - 60 U/L   CBC Auto Differential    Specimen: Blood   Result Value Ref Range    WBC 5.49 3.40 - 10.80 10*3/mm3    RBC 4.21 3.77 - 5.28 10*6/mm3     Hemoglobin 13.0 12.0 - 15.9 g/dL    Hematocrit 41.4 34.0 - 46.6 %    MCV 98.3 (H) 79.0 - 97.0 fL    MCH 30.9 26.6 - 33.0 pg    MCHC 31.4 (L) 31.5 - 35.7 g/dL    RDW 13.9 12.3 - 15.4 %    RDW-SD 50.4 37.0 - 54.0 fl    MPV 9.1 6.0 - 12.0 fL    Platelets 309 140 - 450 10*3/mm3    Neutrophil % 90.1 (H) 42.7 - 76.0 %    Lymphocyte % 5.5 (L) 19.6 - 45.3 %    Monocyte % 3.8 (L) 5.0 - 12.0 %    Eosinophil % 0.0 (L) 0.3 - 6.2 %    Basophil % 0.2 0.0 - 1.5 %    Immature Grans % 0.4 0.0 - 0.5 %    Neutrophils, Absolute 4.95 1.70 - 7.00 10*3/mm3    Lymphocytes, Absolute 0.30 (L) 0.70 - 3.10 10*3/mm3    Monocytes, Absolute 0.21 0.10 - 0.90 10*3/mm3    Eosinophils, Absolute 0.00 0.00 - 0.40 10*3/mm3    Basophils, Absolute 0.01 0.00 - 0.20 10*3/mm3    Immature Grans, Absolute 0.02 0.00 - 0.05 10*3/mm3    nRBC 0.0 0.0 - 0.2 /100 WBC     CT Abdomen Pelvis Without Contrast    Result Date: 3/25/2024  Impression: 1.No acute process identified. Electronically Signed: Dominguez Quintanilla MD  3/25/2024 9:52 AM EDT  Workstation ID: [id number]                                          Medical Decision Making  My interpretation patient CT scan abdomen pelvis without contrast shows no perforation or obstruction.  UA shows no evidence of UTI.  BMP shows no renal insufficiency or electrolyte abnormality.  LFTs are normal with no evidence of hepatitis.  Lipase normal with no evidence of pancreatitis.  Patient has no leukocytosis no left shift and no anemia.  Patient was given Zofran IV with improvement.  She will be discharged with vitals abdominal pain diarrhea nausea.  She will be placed on Zofran.  She is Imodium A-D for diarrhea and see MD for recheck as needed.    Amount and/or Complexity of Data Reviewed  Labs: ordered. Decision-making details documented in ED Course.  Radiology: ordered and independent interpretation performed.    Risk  Prescription drug management.        Final diagnoses:   Generalized abdominal pain   Nausea   Diarrhea,  unspecified type       ED Disposition  ED Disposition       ED Disposition   Discharge    Condition   Stable    Comment   --               No follow-up provider specified.       Medication List        New Prescriptions      ondansetron ODT 4 MG disintegrating tablet  Commonly known as: ZOFRAN-ODT  Place 1 tablet on the tongue Every 8 (Eight) Hours As Needed for Vomiting or Nausea.               Where to Get Your Medications        Information about where to get these medications is not yet available    Ask your nurse or doctor about these medications  ondansetron ODT 4 MG disintegrating tablet            Chino Canales MD  03/25/24 [id number]

## 2024-03-28 ENCOUNTER — APPOINTMENT (OUTPATIENT)
Dept: CARDIAC REHAB | Facility: HOSPITAL | Age: 84
End: 2024-03-28
Payer: MEDICARE

## 2024-04-01 ENCOUNTER — APPOINTMENT (OUTPATIENT)
Dept: CARDIAC REHAB | Facility: HOSPITAL | Age: 84
End: 2024-04-01
Payer: MEDICARE

## 2024-04-04 ENCOUNTER — APPOINTMENT (OUTPATIENT)
Dept: CARDIAC REHAB | Facility: HOSPITAL | Age: 84
End: 2024-04-04
Payer: MEDICARE

## 2024-04-08 ENCOUNTER — APPOINTMENT (OUTPATIENT)
Dept: CARDIAC REHAB | Facility: HOSPITAL | Age: 84
End: 2024-04-08
Payer: MEDICARE

## 2024-04-11 ENCOUNTER — APPOINTMENT (OUTPATIENT)
Dept: CARDIAC REHAB | Facility: HOSPITAL | Age: 84
End: 2024-04-11
Payer: MEDICARE

## 2024-04-15 ENCOUNTER — APPOINTMENT (OUTPATIENT)
Dept: CARDIAC REHAB | Facility: HOSPITAL | Age: 84
End: 2024-04-15
Payer: MEDICARE

## 2024-04-18 ENCOUNTER — APPOINTMENT (OUTPATIENT)
Dept: CARDIAC REHAB | Facility: HOSPITAL | Age: 84
End: 2024-04-18
Payer: MEDICARE

## 2024-04-22 ENCOUNTER — TREATMENT (OUTPATIENT)
Dept: CARDIAC REHAB | Facility: HOSPITAL | Age: 84
End: 2024-04-22
Payer: MEDICARE

## 2024-04-22 DIAGNOSIS — J43.9 PULMONARY EMPHYSEMA, UNSPECIFIED EMPHYSEMA TYPE: Primary | ICD-10-CM

## 2024-04-22 DIAGNOSIS — I27.20 PULMONARY HYPERTENSION: ICD-10-CM

## 2024-04-22 PROCEDURE — G0238 OTH RESP PROC, INDIV: HCPCS

## 2024-04-22 PROCEDURE — G0237 THERAPEUTIC PROCD STRG ENDUR: HCPCS

## 2024-04-24 ENCOUNTER — TREATMENT (OUTPATIENT)
Dept: CARDIAC REHAB | Facility: HOSPITAL | Age: 84
End: 2024-04-24
Payer: MEDICARE

## 2024-04-24 DIAGNOSIS — J43.9 PULMONARY EMPHYSEMA, UNSPECIFIED EMPHYSEMA TYPE: Primary | ICD-10-CM

## 2024-04-24 PROCEDURE — 94625 PHY/QHP OP PULM RHB W/O MNTR: CPT

## 2024-04-25 ENCOUNTER — APPOINTMENT (OUTPATIENT)
Dept: CARDIAC REHAB | Facility: HOSPITAL | Age: 84
End: 2024-04-25
Payer: MEDICARE

## 2024-04-27 NOTE — PROGRESS NOTES
Encounter Date:05/02/2024  Last seen-10/31/2023      Patient ID: Anitra Nobles is a 83 y.o. female.      Chief Complaint:  Mitral regurgitation  Aortic stenosis  History of shortness of breath  Hypothyroidism  Large hiatal hernia     History of Present Illness  Patient is undergoing pulmonary rehab.    Since I have last seen, the patient has been without any chest discomfort ,shortness of breath, palpitations, dizziness or syncope.  Denies having any headache ,abdominal pain ,nausea, vomiting , diarrhea constipation, loss of weight or loss of appetite.  Denies having any excessive bruising ,hematuria or blood in the stool.    Review of all systems negative except as indicated.    Reviewed ROS.  Assessment and Plan         /////////////////////////////  History  ====================   -Shortness of breath and chest discomfort     - history of 2 to 3+ mitral regurgitation.     - Moderate aortic valve stenosis with gradient of 22/12 mmHg and valve area of 1.6 cm².  However at cardiac cath no gradient was noted across the aortic valve.     - Large hiatal hernia (see recent endoscopy)     Cardiac cath 6/30/2023  Pulmonary hypertension (pulmonary artery pressure 56/22 and mean pulmonary artery pressure 34.  Left ventricular size and contractility is normal with ejection fraction of 60%.  Significant mitral regurgitation is present.  No gradient was noted across the aortic valve.  Normal epicardial coronary arteries.     Lexiscan Cardiolite test-normal-5/1/2023     Echocardiogram 5/1/2023 revealed  Left atrial enlargement  Mitral annular calcification  Moderate aortic valve stenosis with gradient of 22/12 mmHg and valve area of 1.6 cm².  Normal left ventricular function.     Attempted QAMAR 6/30/2023-not successful due to large hiatal hernia and significant tortuosity of the esophagus.     Upper endoscopy results as below.  Dr. Scooter Bailey.-6/30/2023  Impression:  1.  Esophagus was full of liquid and some semisolid  food and was massively dilated with severe ulcerations and signs of stasis throughout the entire esophagus.  At 25 cm from the incisors, there is a very sharp turn at 90 degrees followed by another 90 degree turn indicating a very tortuous esophagus.  There is no stricture in the esophagus was widely patent into the stomach at 30 cm.  2.  Very large hiatal hernia with half of the stomach and the patient's chest.  There was food sitting in the hiatal hernia.  It was a very tight narrowing entering the distal stomach through the diaphragmatic hiatus which is likely why the hiatal hernia is pocketed with food  3.  Some larger gastric polyps with the appearance of hyperplastic polyps in the fundus were present and some in the hiatal hernia itself  4.  Normal duodenal mucosa visualized to D3     Recommendations:  Given the patient's shortness of breath and early satiety with changes of her esophagus, I would recommend surgery for her large hiatal hernia.  This will likely improve her respiratory status and her early satiety.  P.o. twice daily PPI  Follow-up in GI clinic     Seen by Dr. Huerta.  Did not think patient would be a surgical candidate for hiatal hernia.     -anemia     -anxiety depression     -History of esophageal stricture.     - status post partial thyroidectomy ganglionectomy and appendectomy     -former smoker     -hypothyroidism     -family history of coronary artery disease     - history of Raynaud's phenomena  ====================   Plan  ================  Shortness of breath l doing better.  Patient was seen by pulmonologist.    Patient is undergoing pulmonary rehabilitation.     Mitral regurgitation-     Moderate aortic valve stenosis.  No gradient was noted across the aortic valve at cardiac catheterization.     Pulmonary hypertension probably secondary to mitral regurgitation.      Patient had attempted QAMAR prior to cardiac catheterization however patient has significant problems with  dysphagia and GI performed upper endoscopy that revealed severe tortuosity of the esophagus achalasia and severe hiatal hernia with half of the stomach in the chest cavity.  QAMAR was not performed.     Patient's symptoms could be from large hiatal hernia in combination with mitral regurgitation which appears to be significant on left ventricular angiogram although mild on transthoracic echocardiogram.  Performing QAMAR would be extremely difficult and risky due to upper endoscopy findings.     Patient has significant pulmonary hypertension likely from mitral regurgitation.     GI follow-up with Dr. Scooter Bailey or Dr. Huerta regarding large hiatal hernia and to discuss surgical options.  Apparently Dr. Huerta did not think patient would be a surgical candidate.     Hypothyroidism-continue levothyroxine     Medications were reviewed and updated.     Patient shortness of breath is multifactorial including pulmonary hypertension mitral regurgitation hiatal hernia significant kyphoscoliosis etc.      Follow-up in the office in 6 months.     Further plan will depend on patient's progress.     Reviewed and updated-5/2/2024.  //////////////////////////////////////////          Diagnosis Plan   1. Mixed hyperlipidemia        2. Acquired hypothyroidism        3. Nonrheumatic mitral valve regurgitation        4. Nonrheumatic aortic valve stenosis        LAB RESULTS (LAST 7 DAYS)    CBC        BMP        CMP         BNP        TROPONIN        CoAg        Creatinine Clearance  CrCl cannot be calculated (Patient's most recent lab result is older than the maximum 30 days allowed.).    ABG        Radiology  No radiology results for the last day                The following portions of the patient's history were reviewed and updated as appropriate: allergies, current medications, past family history, past medical history, past social history, past surgical history, and problem list.    Review of Systems   Constitutional: Positive  for malaise/fatigue.   Cardiovascular:  Negative for chest pain, dyspnea on exertion, leg swelling and palpitations.   Respiratory:  Positive for shortness of breath. Negative for cough.    Gastrointestinal:  Negative for abdominal pain, nausea and vomiting.   Neurological:  Positive for numbness. Negative for dizziness, focal weakness, headaches and light-headedness.   All other systems reviewed and are negative.      Current Outpatient Medications:     Acetaminophen 500 MG capsule, Take 1 capsule by mouth Every 6 (Six) Hours As Needed., Disp: , Rfl:     albuterol sulfate  (90 Base) MCG/ACT inhaler, Inhale 2 puffs Every 4 (Four) Hours As Needed., Disp: , Rfl:     alendronate (FOSAMAX) 70 MG tablet, Take 1 tablet by mouth Every 7 (Seven) Days., Disp: , Rfl:     budesonide (PULMICORT) 0.5 MG/2ML nebulizer solution, Take 2 mL by nebulization 2 (Two) Times a Day., Disp: , Rfl:     Calcium Citrate-Vitamin D (CALCIUM + D PO), Take 1,200 mg by mouth Daily., Disp: , Rfl:     Cranberry 450 MG tablet, Take 1 tablet by mouth Daily., Disp: , Rfl:     Cyanocobalamin (B-12) 1000 MCG capsule, Take 1 capsule by mouth Daily., Disp: , Rfl:     escitalopram (LEXAPRO) 20 MG tablet, Take 1 tablet by mouth Daily., Disp: , Rfl:     formoterol (PERFOROMIST) 20 MCG/2ML nebulizer solution, Take 2 mL by nebulization 2 (Two) Times a Day., Disp: , Rfl:     levocetirizine (XYZAL) 5 MG tablet, Take 1 tablet by mouth Every Evening., Disp: , Rfl:     losartan (COZAAR) 50 MG tablet, Take 1 tablet by mouth Daily., Disp: , Rfl:     Lumigan 0.01 % ophthalmic drops, Administer 1 drop to both eyes Every Night., Disp: , Rfl:     O2 (OXYGEN), Inhale 3 L/min Continuous., Disp: , Rfl:     omeprazole (priLOSEC) 40 MG capsule, Take 1 capsule by mouth Daily., Disp: , Rfl:     ondansetron ODT (ZOFRAN-ODT) 4 MG disintegrating tablet, Place 1 tablet on the tongue Every 8 (Eight) Hours As Needed for Vomiting or Nausea., Disp: 12 tablet, Rfl: 0     revefenacin (Yupelri) 175 MCG/3ML nebulizer solution, Take 3 mL by nebulization Daily., Disp: , Rfl:     SYNTHROID 75 MCG tablet, Take 1 tablet by mouth Daily., Disp: , Rfl:     No Known Allergies    Family History   Problem Relation Age of Onset    Heart disease Father     Ovarian cancer Sister     Colon polyps Sister     Colon cancer Brother     Colon polyps Brother        Past Surgical History:   Procedure Laterality Date    APPENDECTOMY N/A     Dr. Adame    BLADDER REPAIR  2019    BREAST BIOPSY Left 2012    BENIGN    CARDIAC CATHETERIZATION N/A 2023    Procedure: Right and Left Heart Cath with Coronar Angiography;  Surgeon: Brian Malone MD;  Location: [facility] CATH INVASIVE LOCATION;  Service: Cardiovascular;  Laterality: N/A;    CATARACT EXTRACTION      Dr. Shaffer    COLONOSCOPY N/A     ENDOSCOPY N/A 2023    Procedure: ESOPHAGOGASTRODUODENOSCOPY with Dilation;  Surgeon: Scooter Bailey MD;  Location: [facility] ENDOSCOPY;  Service: Gastroenterology;  Laterality: N/A;  Post- Hiatal hernia, achalasia, food in esophagus    RECTAL PROLAPSE REPAIR  2019    THYROIDECTOMY, PARTIAL  2002    UPPER GASTROINTESTINAL ENDOSCOPY N/A     Dr. Huerta       Past Medical History:   Diagnosis Date    Anemia     Anxiety     Arthritis     Depression     GERD (gastroesophageal reflux disease)     Hypertension     Pulmonary hypertension 3/19/2018    Raynauds disease     Shortness of breath 2016    Thyroid disorder        Family History   Problem Relation Age of Onset    Heart disease Father     Ovarian cancer Sister     Colon polyps Sister     Colon cancer Brother     Colon polyps Brother        Social History     Socioeconomic History    Marital status:    Tobacco Use    Smoking status: Former     Current packs/day: 0.00     Types: Cigarettes     Quit date:      Years since quittin.3     Passive exposure: Past    Smokeless tobacco: Never   Vaping Use    Vaping status: Never Used    Substance and Sexual Activity    Alcohol use: Yes     Alcohol/week: 7.0 standard drinks of alcohol     Types: 7 Glasses of wine per week    Drug use: Never    Sexual activity: Defer           ECG 12 Lead    Date/Time: 5/2/2024 1:18 PM  Performed by: Brian Malone MD    Authorized by: Brian Malone MD  Comparison: compared with previous ECG   Similar to previous ECG  Comparison to previous ECG: Normal sinus rhythm indeterminate axis 70/min nonspecific ST-T wave changes no ectopy no significant change from previous EKG.        Objective:       Physical Exam    There were no vitals taken for this visit.  The patient is alert, oriented and in no distress.    Vital signs as noted above.    Head and neck revealed no carotid bruits or jugular venous distension.  No thyromegaly or lymphadenopathy is present.    Lungs clear.  No wheezing.  Breath sounds are normal bilaterally.    Heart normal first and second heart sounds..  2/6 to 3/6 systolic murmur..  No pericardial rub is present.  No gallop is present.    Abdomen soft and nontender.  No organomegaly is present.    Extremities revealed good peripheral pulses without any pedal edema.    Skin warm and dry.    Musculoskeletal system-kyphoscoliosis    CNS grossly normal.    Reviewed and updated.

## 2024-04-29 ENCOUNTER — TREATMENT (OUTPATIENT)
Dept: CARDIAC REHAB | Facility: HOSPITAL | Age: 84
End: 2024-04-29
Payer: MEDICARE

## 2024-04-29 DIAGNOSIS — J43.9 PULMONARY EMPHYSEMA, UNSPECIFIED EMPHYSEMA TYPE: Primary | ICD-10-CM

## 2024-04-29 PROCEDURE — G0237 THERAPEUTIC PROCD STRG ENDUR: HCPCS

## 2024-04-29 PROCEDURE — G0238 OTH RESP PROC, INDIV: HCPCS

## 2024-05-02 ENCOUNTER — OFFICE VISIT (OUTPATIENT)
Dept: CARDIOLOGY | Facility: CLINIC | Age: 84
End: 2024-05-02
Payer: MEDICARE

## 2024-05-02 ENCOUNTER — APPOINTMENT (OUTPATIENT)
Dept: CARDIAC REHAB | Facility: HOSPITAL | Age: 84
End: 2024-05-02
Payer: MEDICARE

## 2024-05-02 VITALS
SYSTOLIC BLOOD PRESSURE: 136 MMHG | DIASTOLIC BLOOD PRESSURE: 71 MMHG | OXYGEN SATURATION: 77 % | WEIGHT: 109 LBS | HEART RATE: 52 BPM | HEIGHT: 59 IN | BODY MASS INDEX: 21.97 KG/M2

## 2024-05-02 DIAGNOSIS — E03.9 ACQUIRED HYPOTHYROIDISM: ICD-10-CM

## 2024-05-02 DIAGNOSIS — I35.0 NONRHEUMATIC AORTIC VALVE STENOSIS: ICD-10-CM

## 2024-05-02 DIAGNOSIS — E78.2 MIXED HYPERLIPIDEMIA: Primary | ICD-10-CM

## 2024-05-02 DIAGNOSIS — I34.0 NONRHEUMATIC MITRAL VALVE REGURGITATION: ICD-10-CM

## 2024-05-02 PROCEDURE — 3078F DIAST BP <80 MM HG: CPT | Performed by: INTERNAL MEDICINE

## 2024-05-02 PROCEDURE — 1159F MED LIST DOCD IN RCRD: CPT | Performed by: INTERNAL MEDICINE

## 2024-05-02 PROCEDURE — 93000 ELECTROCARDIOGRAM COMPLETE: CPT | Performed by: INTERNAL MEDICINE

## 2024-05-02 PROCEDURE — 3075F SYST BP GE 130 - 139MM HG: CPT | Performed by: INTERNAL MEDICINE

## 2024-05-02 PROCEDURE — 1160F RVW MEDS BY RX/DR IN RCRD: CPT | Performed by: INTERNAL MEDICINE

## 2024-05-02 PROCEDURE — 99214 OFFICE O/P EST MOD 30 MIN: CPT | Performed by: INTERNAL MEDICINE

## 2024-05-02 RX ORDER — BRIMONIDINE TARTRATE 2 MG/ML
SOLUTION/ DROPS OPHTHALMIC
COMMUNITY
Start: 2024-02-06

## 2024-05-06 ENCOUNTER — TREATMENT (OUTPATIENT)
Dept: CARDIAC REHAB | Facility: HOSPITAL | Age: 84
End: 2024-05-06
Payer: MEDICARE

## 2024-05-06 DIAGNOSIS — J43.9 PULMONARY EMPHYSEMA, UNSPECIFIED EMPHYSEMA TYPE: Primary | ICD-10-CM

## 2024-05-06 PROCEDURE — 94625 PHY/QHP OP PULM RHB W/O MNTR: CPT

## 2024-05-09 ENCOUNTER — TREATMENT (OUTPATIENT)
Dept: CARDIAC REHAB | Facility: HOSPITAL | Age: 84
End: 2024-05-09
Payer: MEDICARE

## 2024-05-09 DIAGNOSIS — I27.20 PULMONARY HYPERTENSION: ICD-10-CM

## 2024-05-09 DIAGNOSIS — J43.9 PULMONARY EMPHYSEMA, UNSPECIFIED EMPHYSEMA TYPE: Primary | ICD-10-CM

## 2024-05-09 PROCEDURE — G0237 THERAPEUTIC PROCD STRG ENDUR: HCPCS

## 2024-05-09 PROCEDURE — G0238 OTH RESP PROC, INDIV: HCPCS

## 2024-05-13 ENCOUNTER — TREATMENT (OUTPATIENT)
Dept: CARDIAC REHAB | Facility: HOSPITAL | Age: 84
End: 2024-05-13
Payer: MEDICARE

## 2024-05-13 DIAGNOSIS — J43.9 PULMONARY EMPHYSEMA, UNSPECIFIED EMPHYSEMA TYPE: Primary | ICD-10-CM

## 2024-05-13 DIAGNOSIS — I27.20 PULMONARY HYPERTENSION: ICD-10-CM

## 2024-05-13 PROCEDURE — G0238 OTH RESP PROC, INDIV: HCPCS

## 2024-05-13 PROCEDURE — G0237 THERAPEUTIC PROCD STRG ENDUR: HCPCS

## 2024-05-16 ENCOUNTER — APPOINTMENT (OUTPATIENT)
Dept: CARDIAC REHAB | Facility: HOSPITAL | Age: 84
End: 2024-05-16
Payer: MEDICARE

## 2024-05-20 ENCOUNTER — TREATMENT (OUTPATIENT)
Dept: CARDIAC REHAB | Facility: HOSPITAL | Age: 84
End: 2024-05-20
Payer: MEDICARE

## 2024-05-20 DIAGNOSIS — J43.9 PULMONARY EMPHYSEMA, UNSPECIFIED EMPHYSEMA TYPE: Primary | ICD-10-CM

## 2024-05-20 DIAGNOSIS — I27.20 PULMONARY HYPERTENSION: ICD-10-CM

## 2024-05-20 PROCEDURE — G0238 OTH RESP PROC, INDIV: HCPCS

## 2024-05-20 PROCEDURE — G0237 THERAPEUTIC PROCD STRG ENDUR: HCPCS

## 2024-05-30 ENCOUNTER — TREATMENT (OUTPATIENT)
Dept: CARDIAC REHAB | Facility: HOSPITAL | Age: 84
End: 2024-05-30
Payer: MEDICARE

## 2024-05-30 DIAGNOSIS — J43.9 PULMONARY EMPHYSEMA, UNSPECIFIED EMPHYSEMA TYPE: Primary | ICD-10-CM

## 2024-05-30 PROCEDURE — G0237 THERAPEUTIC PROCD STRG ENDUR: HCPCS

## 2024-05-30 PROCEDURE — G0238 OTH RESP PROC, INDIV: HCPCS

## 2024-06-03 ENCOUNTER — TREATMENT (OUTPATIENT)
Dept: CARDIAC REHAB | Facility: HOSPITAL | Age: 84
End: 2024-06-03
Payer: MEDICARE

## 2024-06-03 DIAGNOSIS — J43.9 PULMONARY EMPHYSEMA, UNSPECIFIED EMPHYSEMA TYPE: Primary | ICD-10-CM

## 2024-06-03 PROCEDURE — G0237 THERAPEUTIC PROCD STRG ENDUR: HCPCS

## 2024-06-03 PROCEDURE — G0238 OTH RESP PROC, INDIV: HCPCS

## 2024-06-06 ENCOUNTER — APPOINTMENT (OUTPATIENT)
Dept: CARDIAC REHAB | Facility: HOSPITAL | Age: 84
End: 2024-06-06
Payer: MEDICARE

## 2024-06-10 ENCOUNTER — TREATMENT (OUTPATIENT)
Dept: CARDIAC REHAB | Facility: HOSPITAL | Age: 84
End: 2024-06-10
Payer: MEDICARE

## 2024-06-10 DIAGNOSIS — J43.9 PULMONARY EMPHYSEMA, UNSPECIFIED EMPHYSEMA TYPE: Primary | ICD-10-CM

## 2024-06-10 PROCEDURE — 94625 PHY/QHP OP PULM RHB W/O MNTR: CPT

## 2024-06-13 ENCOUNTER — TREATMENT (OUTPATIENT)
Dept: CARDIAC REHAB | Facility: HOSPITAL | Age: 84
End: 2024-06-13
Payer: MEDICARE

## 2024-06-13 DIAGNOSIS — J43.9 PULMONARY EMPHYSEMA, UNSPECIFIED EMPHYSEMA TYPE: Primary | ICD-10-CM

## 2024-06-13 PROCEDURE — G0238 OTH RESP PROC, INDIV: HCPCS

## 2024-06-13 PROCEDURE — G0237 THERAPEUTIC PROCD STRG ENDUR: HCPCS

## 2024-06-17 ENCOUNTER — APPOINTMENT (OUTPATIENT)
Dept: CARDIAC REHAB | Facility: HOSPITAL | Age: 84
End: 2024-06-17
Payer: MEDICARE

## 2024-06-20 ENCOUNTER — TREATMENT (OUTPATIENT)
Dept: CARDIAC REHAB | Facility: HOSPITAL | Age: 84
End: 2024-06-20
Payer: MEDICARE

## 2024-06-20 DIAGNOSIS — J43.9 PULMONARY EMPHYSEMA, UNSPECIFIED EMPHYSEMA TYPE: Primary | ICD-10-CM

## 2024-06-20 DIAGNOSIS — I27.20 PULMONARY HYPERTENSION: ICD-10-CM

## 2024-06-20 PROCEDURE — G0237 THERAPEUTIC PROCD STRG ENDUR: HCPCS

## 2024-06-20 PROCEDURE — 94625 PHY/QHP OP PULM RHB W/O MNTR: CPT

## 2024-06-20 PROCEDURE — G0238 OTH RESP PROC, INDIV: HCPCS

## 2024-06-24 ENCOUNTER — APPOINTMENT (OUTPATIENT)
Dept: CARDIAC REHAB | Facility: HOSPITAL | Age: 84
End: 2024-06-24
Payer: MEDICARE

## 2024-06-27 ENCOUNTER — APPOINTMENT (OUTPATIENT)
Dept: CARDIAC REHAB | Facility: HOSPITAL | Age: 84
End: 2024-06-27
Payer: MEDICARE

## 2024-07-01 ENCOUNTER — TREATMENT (OUTPATIENT)
Dept: CARDIAC REHAB | Facility: HOSPITAL | Age: 84
End: 2024-07-01
Payer: MEDICARE

## 2024-07-01 DIAGNOSIS — J43.9 PULMONARY EMPHYSEMA, UNSPECIFIED EMPHYSEMA TYPE: Primary | ICD-10-CM

## 2024-07-01 PROCEDURE — G0237 THERAPEUTIC PROCD STRG ENDUR: HCPCS

## 2024-07-01 PROCEDURE — G0238 OTH RESP PROC, INDIV: HCPCS

## 2024-07-08 ENCOUNTER — TREATMENT (OUTPATIENT)
Dept: CARDIAC REHAB | Facility: HOSPITAL | Age: 84
End: 2024-07-08
Payer: MEDICARE

## 2024-07-08 DIAGNOSIS — J43.9 PULMONARY EMPHYSEMA, UNSPECIFIED EMPHYSEMA TYPE: Primary | ICD-10-CM

## 2024-07-08 PROCEDURE — 94625 PHY/QHP OP PULM RHB W/O MNTR: CPT

## 2024-07-11 ENCOUNTER — APPOINTMENT (OUTPATIENT)
Dept: CARDIAC REHAB | Facility: HOSPITAL | Age: 84
End: 2024-07-11
Payer: MEDICARE

## 2024-07-15 ENCOUNTER — TREATMENT (OUTPATIENT)
Dept: CARDIAC REHAB | Facility: HOSPITAL | Age: 84
End: 2024-07-15
Payer: MEDICARE

## 2024-07-15 DIAGNOSIS — J98.4 RESTRICTIVE LUNG DISEASE: ICD-10-CM

## 2024-07-15 DIAGNOSIS — J43.9 PULMONARY EMPHYSEMA, UNSPECIFIED EMPHYSEMA TYPE: Primary | ICD-10-CM

## 2024-07-15 PROCEDURE — G0238 OTH RESP PROC, INDIV: HCPCS

## 2024-07-15 PROCEDURE — G0237 THERAPEUTIC PROCD STRG ENDUR: HCPCS

## 2024-07-18 ENCOUNTER — APPOINTMENT (OUTPATIENT)
Dept: CARDIAC REHAB | Facility: HOSPITAL | Age: 84
End: 2024-07-18
Payer: MEDICARE

## 2024-07-22 ENCOUNTER — APPOINTMENT (OUTPATIENT)
Dept: CARDIAC REHAB | Facility: HOSPITAL | Age: 84
End: 2024-07-22
Payer: MEDICARE

## 2024-07-29 ENCOUNTER — TREATMENT (OUTPATIENT)
Dept: CARDIAC REHAB | Facility: HOSPITAL | Age: 84
End: 2024-07-29
Payer: MEDICARE

## 2024-07-29 DIAGNOSIS — I27.20 PULMONARY HYPERTENSION: ICD-10-CM

## 2024-07-29 DIAGNOSIS — J43.9 PULMONARY EMPHYSEMA, UNSPECIFIED EMPHYSEMA TYPE: Primary | ICD-10-CM

## 2024-07-29 DIAGNOSIS — J98.4 RESTRICTIVE LUNG DISEASE: ICD-10-CM

## 2024-07-29 PROCEDURE — G0238 OTH RESP PROC, INDIV: HCPCS

## 2024-07-29 PROCEDURE — G0237 THERAPEUTIC PROCD STRG ENDUR: HCPCS

## 2024-08-01 ENCOUNTER — APPOINTMENT (OUTPATIENT)
Dept: CARDIAC REHAB | Facility: HOSPITAL | Age: 84
End: 2024-08-01
Payer: MEDICARE

## 2024-08-05 ENCOUNTER — TREATMENT (OUTPATIENT)
Dept: CARDIAC REHAB | Facility: HOSPITAL | Age: 84
End: 2024-08-05
Payer: MEDICARE

## 2024-08-05 DIAGNOSIS — J43.9 PULMONARY EMPHYSEMA, UNSPECIFIED EMPHYSEMA TYPE: Primary | ICD-10-CM

## 2024-08-05 DIAGNOSIS — J98.4 RESTRICTIVE LUNG DISEASE: ICD-10-CM

## 2024-08-05 PROCEDURE — G0238 OTH RESP PROC, INDIV: HCPCS

## 2024-08-05 PROCEDURE — G0237 THERAPEUTIC PROCD STRG ENDUR: HCPCS

## 2024-08-15 ENCOUNTER — TELEPHONE (OUTPATIENT)
Dept: CARDIAC REHAB | Facility: HOSPITAL | Age: 84
End: 2024-08-15
Payer: MEDICARE

## 2024-08-15 NOTE — TELEPHONE ENCOUNTER
Called pt and lvm to notify her that she has 1 more appt left to complete [place] program. Notified pt that she will have to come by 9/6/24 to complete program. Encouraged to call back    Abigail Meyers, RRT

## 2024-08-19 ENCOUNTER — TREATMENT (OUTPATIENT)
Dept: CARDIAC REHAB | Facility: HOSPITAL | Age: 84
End: 2024-08-19
Payer: MEDICARE

## 2024-08-19 DIAGNOSIS — J98.4 RESTRICTIVE LUNG DISEASE: Primary | ICD-10-CM

## 2024-08-19 PROCEDURE — G0237 THERAPEUTIC PROCD STRG ENDUR: HCPCS

## 2024-08-19 PROCEDURE — G0238 OTH RESP PROC, INDIV: HCPCS

## 2024-08-29 ENCOUNTER — OFFICE VISIT (OUTPATIENT)
Dept: PULMONOLOGY | Facility: HOSPITAL | Age: 84
End: 2024-08-29
Payer: MEDICARE

## 2024-08-29 VITALS
HEIGHT: 59 IN | DIASTOLIC BLOOD PRESSURE: 75 MMHG | SYSTOLIC BLOOD PRESSURE: 136 MMHG | BODY MASS INDEX: 21.57 KG/M2 | HEART RATE: 57 BPM | RESPIRATION RATE: 12 BRPM | WEIGHT: 107 LBS

## 2024-08-29 DIAGNOSIS — I34.0 NONRHEUMATIC MITRAL VALVE REGURGITATION: ICD-10-CM

## 2024-08-29 DIAGNOSIS — K44.9 HIATAL HERNIA: ICD-10-CM

## 2024-08-29 DIAGNOSIS — I27.20 PULMONARY HYPERTENSION: Primary | ICD-10-CM

## 2024-08-29 DIAGNOSIS — J98.4 RESTRICTIVE LUNG DISEASE: ICD-10-CM

## 2024-08-29 DIAGNOSIS — J96.11 CHRONIC RESPIRATORY FAILURE WITH HYPOXIA: ICD-10-CM

## 2024-08-29 RX ORDER — AZELASTINE HYDROCHLORIDE 0.5 MG/ML
SOLUTION/ DROPS OPHTHALMIC DAILY
COMMUNITY
Start: 2024-08-26

## 2024-08-29 NOTE — PROGRESS NOTES
HPI:  Patient here for pulmonary follow-up.  She reports her breathing is stable, she is doing pulmonary rehab and using oxygen continuously.  She still losing weight although that she is eating multiple small meals.     She has mild dry cough.  She reports due to the hiatal hernia she takes small multiple meals, her appetite is not great, she lost few pounds in the last 6-month    Past Medical History:   Diagnosis Date    Anemia     Anxiety     Arthritis     Depression     GERD (gastroesophageal reflux disease)     Hypertension     Pulmonary hypertension 3/19/2018    Raynauds disease     Shortness of breath 7/20/2016    Thyroid disorder         Current Outpatient Medications on File Prior to Visit   Medication Sig Dispense Refill    Acetaminophen 500 MG capsule Take 1 capsule by mouth Every 6 (Six) Hours As Needed.      albuterol sulfate  (90 Base) MCG/ACT inhaler Inhale 2 puffs Every 4 (Four) Hours As Needed.      azelastine (OPTIVAR) 0.05 % ophthalmic solution Daily.      brimonidine (ALPHAGAN) 0.2 % ophthalmic solution instill 1 drop by ophthalmic route  BID OU      budesonide (PULMICORT) 0.5 MG/2ML nebulizer solution Take 2 mL by nebulization 2 (Two) Times a Day.      Cranberry 450 MG tablet Take 1 tablet by mouth Daily.      Cyanocobalamin (B-12) 1000 MCG capsule Take 1 capsule by mouth Daily.      escitalopram (LEXAPRO) 20 MG tablet Take 1 tablet by mouth Daily.      formoterol (PERFOROMIST) 20 MCG/2ML nebulizer solution Take 2 mL by nebulization 2 (Two) Times a Day.      levocetirizine (XYZAL) 5 MG tablet Take 1 tablet by mouth Every Evening.      losartan (COZAAR) 50 MG tablet Take 1 tablet by mouth Daily.      O2 (OXYGEN) Inhale 3 L/min Continuous.      omeprazole (priLOSEC) 40 MG capsule Take 1 capsule by mouth Daily.      ondansetron ODT (ZOFRAN-ODT) 4 MG disintegrating tablet Place 1 tablet on the tongue Every 8 (Eight) Hours As Needed for Vomiting or Nausea. 12 tablet 0    revefenacin  "(Yupelri) 175 MCG/3ML nebulizer solution Take 3 mL by nebulization Daily.      SYNTHROID 75 MCG tablet Take 1 tablet by mouth Daily.       No current facility-administered medications on file prior to visit.        Social History     Tobacco Use    Smoking status: Former     Current packs/day: 0.00     Types: Cigarettes     Quit date:      Years since quittin.6     Passive exposure: Past    Smokeless tobacco: Never   Vaping Use    Vaping status: Never Used   Substance Use Topics    Alcohol use: Yes     Alcohol/week: 7.0 standard drinks of alcohol     Types: 7 Glasses of wine per week    Drug use: Never        Family History   Problem Relation Age of Onset    No Known Problems Mother     Heart disease Father     Ovarian cancer Sister     Colon polyps Sister     Colon cancer Brother     Colon polyps Brother         Review of system:  Constitutional: Negative for chills, fever and malaise/fatigue.   HENT: Negative.    Eyes: Negative.    Cardiovascular: Negative.    Respiratory: Positive for cough and shortness of breath.    Skin: Negative.    Musculoskeletal: Negative.    Gastrointestinal: Negative.    Genitourinary: Negative.    Neurological: Negative.    Psychiatric/Behavioral: Negative.    Physical exam:  Blood pressure 136/75, pulse 57, resp. rate 12, height 149.9 cm (59\"), weight 48.5 kg (107 lb).    General Appearance:  Alert   HEENT:  Normocephalic, without obvious abnormality, Conjunctiva/corneas clear,.   Nares normal, no drainage     Neck:  Supple, symmetrical, trachea midline. No JVD.  Lungs /Chest wall:   good air entry Bilaterlly, respirations unlabored, symmetrical wall movement.     Heart:  Regular rate and rhythm, S1 S2 normal, 3/6 systolic murmur  Abdomen: Soft, non-tender, no masses, no organomegaly.    Extremities: No edema, no clubbing or cyanosis    No radiology results for the last 90 days.   Results for orders placed during the hospital encounter of 23    Adult Transesophageal Echo " (QAMAR) W/ Cont if Necessary Per Protocol    Interpretation Summary  Date of procedure  6/30/2023    Indications  Mitral regurgitation  Aortic valve stenosis  Shortness of breath    Procedure performed  Transesophageal echocardiogram (attempted) and Doppler study    Procedure  Anesthesia was provided by anesthesiologist with intravenous Diprivan.  An attempt was made to pass QAMAR probe but could not be advanced beyond 25 cm and patient has history of dysphagia and only gentle pressure was used but could not advance the QAMAR probe.  QAMAR was abandoned at this time and GI consultation for upper endoscopy was obtained.  Patient had upper endoscopy and revealed the following results.  Proceed with cardiac catheterization.      Impression:  1.  Esophagus was full of liquid and some semisolid food and was massively dilated with severe ulcerations and signs of stasis throughout the entire esophagus.  At 25 cm from the incisors, there is a very sharp turn at 90 degrees followed by another 90 degree turn indicating a very tortuous esophagus.  There is no stricture in the esophagus was widely patent into the stomach at 30 cm.  2.  Very large hiatal hernia with half of the stomach and the patient's chest.  There was food sitting in the hiatal hernia.  It was a very tight narrowing entering the distal stomach through the diaphragmatic hiatus which is likely why the hiatal hernia is pocketed with food  3.  Some larger gastric polyps with the appearance of hyperplastic polyps in the fundus were present and some in the hiatal hernia itself  4.  Normal duodenal mucosa visualized to D3      Recommendations:  Given the patient's shortness of breath and early satiety with changes of her esophagus, I would recommend surgery for her large hiatal hernia.  This will likely improve her respiratory status and her early satiety.  P.o. twice daily PPI  Follow-up in GI clinic      Scooter Bailey MD        Assessment and plan:  Shortness of breath  on exertion  Hypoxemia: PFTs December 2023 showed restrictive lung disease with decreased DLCO, FEV1/FVC 70%, FVC 84%, FEV1 82%, TLC 73%, DLCO 47%  PFTs 2021 showed preserved lung volumes but DLCO was down to 52%  Pulmonary hypertension on cardiac catheter June 2022 PA pressure 56/22 with mean pressure of 34 and wedge pressure 14  Small multiple lung nodules: Stable March 2024  Valvular heart disease, significant mitral regurgitation and moderate aortic stenosis  Large hiatal hernia  Scoliosis  Former smoker: Patient smoked only for 10 years  Hypothyroidism  Raynaud's phenomena     Plan:  From the pulmonary standpoint patient is at high risk for cardiac/thoracic surgery, the pulmonary hypertension is likely due to valvular heart disease.  Recovery could be hampered by hypoxia, large hiatal hernia and scoliosis  Patient is benefiting from pulmonary rehab  Continue with oxygen 2 L at bedtime and as needed, patient is compliant and benefiting from treatment    Patient currently on budesonide and Perforomist nebulizer  Sildenafil is unlikely to benefit the pulmonary hypertension until the valvular disease has been corrected     Follow-up in 6 months     CT chest March 2024    I personally reviewed the latest radiological study  Patient is advised to stay up-to-date on immunizations for flu, pneumococcal and COVID-19

## 2024-09-26 ENCOUNTER — HOSPITAL ENCOUNTER (OUTPATIENT)
Facility: HOSPITAL | Age: 84
Setting detail: OBSERVATION
Discharge: HOME | End: 2024-09-27
Attending: EMERGENCY MEDICINE | Admitting: INTERNAL MEDICINE
Payer: MEDICARE

## 2024-09-26 ENCOUNTER — APPOINTMENT (OUTPATIENT)
Dept: CT IMAGING | Facility: HOSPITAL | Age: 84
End: 2024-09-26
Payer: MEDICARE

## 2024-09-26 DIAGNOSIS — R11.2 INTRACTABLE NAUSEA AND VOMITING: Primary | ICD-10-CM

## 2024-09-26 LAB
ALBUMIN SERPL-MCNC: 4.5 G/DL (ref 3.5–5.2)
ALBUMIN/GLOB SERPL: 1.8 G/DL
ALP SERPL-CCNC: 66 U/L (ref 39–117)
ALT SERPL W P-5'-P-CCNC: 5 U/L (ref 1–33)
ANION GAP SERPL CALCULATED.3IONS-SCNC: 13.7 MMOL/L (ref 5–15)
AST SERPL-CCNC: 20 U/L (ref 1–32)
BACTERIA UR QL AUTO: ABNORMAL /HPF
BASOPHILS # BLD AUTO: 0.01 10*3/MM3 (ref 0–0.2)
BASOPHILS NFR BLD AUTO: 0.1 % (ref 0–1.5)
BILIRUB SERPL-MCNC: 0.9 MG/DL (ref 0–1.2)
BILIRUB UR QL STRIP: NEGATIVE
BUN SERPL-MCNC: 15 MG/DL (ref 8–23)
BUN/CREAT SERPL: 23.4 (ref 7–25)
CALCIUM SPEC-SCNC: 10.3 MG/DL (ref 8.6–10.5)
CHLORIDE SERPL-SCNC: 101 MMOL/L (ref 98–107)
CLARITY UR: CLEAR
CO2 SERPL-SCNC: 23.3 MMOL/L (ref 22–29)
COLOR UR: YELLOW
CREAT SERPL-MCNC: 0.64 MG/DL (ref 0.57–1)
DEPRECATED RDW RBC AUTO: 54 FL (ref 37–54)
EGFRCR SERPLBLD CKD-EPI 2021: 87.8 ML/MIN/1.73
EOSINOPHIL # BLD AUTO: 0 10*3/MM3 (ref 0–0.4)
EOSINOPHIL NFR BLD AUTO: 0 % (ref 0.3–6.2)
ERYTHROCYTE [DISTWIDTH] IN BLOOD BY AUTOMATED COUNT: 14.9 % (ref 12.3–15.4)
GLOBULIN UR ELPH-MCNC: 2.5 GM/DL
GLUCOSE SERPL-MCNC: 141 MG/DL (ref 65–99)
GLUCOSE UR STRIP-MCNC: NEGATIVE MG/DL
HCT VFR BLD AUTO: 41.7 % (ref 34–46.6)
HGB BLD-MCNC: 13.4 G/DL (ref 12–15.9)
HGB UR QL STRIP.AUTO: NEGATIVE
HYALINE CASTS UR QL AUTO: ABNORMAL /LPF
IMM GRANULOCYTES # BLD AUTO: 0.03 10*3/MM3 (ref 0–0.05)
IMM GRANULOCYTES NFR BLD AUTO: 0.4 % (ref 0–0.5)
KETONES UR QL STRIP: ABNORMAL
LEUKOCYTE ESTERASE UR QL STRIP.AUTO: ABNORMAL
LIPASE SERPL-CCNC: 14 U/L (ref 13–60)
LYMPHOCYTES # BLD AUTO: 0.38 10*3/MM3 (ref 0.7–3.1)
LYMPHOCYTES NFR BLD AUTO: 4.9 % (ref 19.6–45.3)
MCH RBC QN AUTO: 31.8 PG (ref 26.6–33)
MCHC RBC AUTO-ENTMCNC: 32.1 G/DL (ref 31.5–35.7)
MCV RBC AUTO: 98.8 FL (ref 79–97)
MONOCYTES # BLD AUTO: 0.42 10*3/MM3 (ref 0.1–0.9)
MONOCYTES NFR BLD AUTO: 5.4 % (ref 5–12)
NEUTROPHILS NFR BLD AUTO: 6.99 10*3/MM3 (ref 1.7–7)
NEUTROPHILS NFR BLD AUTO: 89.2 % (ref 42.7–76)
NITRITE UR QL STRIP: NEGATIVE
NRBC BLD AUTO-RTO: 0 /100 WBC (ref 0–0.2)
PH UR STRIP.AUTO: 6.5 [PH] (ref 5–8)
PLATELET # BLD AUTO: 280 10*3/MM3 (ref 140–450)
PMV BLD AUTO: 9.5 FL (ref 6–12)
POTASSIUM SERPL-SCNC: 4.1 MMOL/L (ref 3.5–5.2)
PROT SERPL-MCNC: 7 G/DL (ref 6–8.5)
PROT UR QL STRIP: ABNORMAL
RBC # BLD AUTO: 4.22 10*6/MM3 (ref 3.77–5.28)
RBC # UR STRIP: ABNORMAL /HPF
REF LAB TEST METHOD: ABNORMAL
SODIUM SERPL-SCNC: 138 MMOL/L (ref 136–145)
SP GR UR STRIP: 1.02 (ref 1–1.03)
SQUAMOUS #/AREA URNS HPF: ABNORMAL /HPF
UROBILINOGEN UR QL STRIP: ABNORMAL
WBC # UR STRIP: ABNORMAL /HPF
WBC NRBC COR # BLD AUTO: 7.83 10*3/MM3 (ref 3.4–10.8)

## 2024-09-26 PROCEDURE — 81001 URINALYSIS AUTO W/SCOPE: CPT | Performed by: EMERGENCY MEDICINE

## 2024-09-26 PROCEDURE — 83690 ASSAY OF LIPASE: CPT | Performed by: EMERGENCY MEDICINE

## 2024-09-26 PROCEDURE — 25010000002 ONDANSETRON PER 1 MG: Performed by: EMERGENCY MEDICINE

## 2024-09-26 PROCEDURE — 85025 COMPLETE CBC W/AUTO DIFF WBC: CPT | Performed by: EMERGENCY MEDICINE

## 2024-09-26 PROCEDURE — 99285 EMERGENCY DEPT VISIT HI MDM: CPT

## 2024-09-26 PROCEDURE — G0378 HOSPITAL OBSERVATION PER HR: HCPCS

## 2024-09-26 PROCEDURE — 96374 THER/PROPH/DIAG INJ IV PUSH: CPT

## 2024-09-26 PROCEDURE — 25810000003 SODIUM CHLORIDE 0.9 % SOLUTION

## 2024-09-26 PROCEDURE — 80053 COMPREHEN METABOLIC PANEL: CPT | Performed by: EMERGENCY MEDICINE

## 2024-09-26 PROCEDURE — 96361 HYDRATE IV INFUSION ADD-ON: CPT

## 2024-09-26 PROCEDURE — 25510000001 IOPAMIDOL PER 1 ML: Performed by: EMERGENCY MEDICINE

## 2024-09-26 PROCEDURE — 96376 TX/PRO/DX INJ SAME DRUG ADON: CPT

## 2024-09-26 PROCEDURE — 25010000002 HYDRALAZINE PER 20 MG

## 2024-09-26 PROCEDURE — 36415 COLL VENOUS BLD VENIPUNCTURE: CPT

## 2024-09-26 PROCEDURE — 25810000003 SODIUM CHLORIDE 0.9 % SOLUTION: Performed by: EMERGENCY MEDICINE

## 2024-09-26 PROCEDURE — 74177 CT ABD & PELVIS W/CONTRAST: CPT

## 2024-09-26 PROCEDURE — 96375 TX/PRO/DX INJ NEW DRUG ADDON: CPT

## 2024-09-26 RX ORDER — ONDANSETRON 2 MG/ML
4 INJECTION INTRAMUSCULAR; INTRAVENOUS ONCE
Status: COMPLETED | OUTPATIENT
Start: 2024-09-26 | End: 2024-09-26

## 2024-09-26 RX ORDER — HYDRALAZINE HYDROCHLORIDE 20 MG/ML
10 INJECTION INTRAMUSCULAR; INTRAVENOUS EVERY 6 HOURS PRN
Status: DISCONTINUED | OUTPATIENT
Start: 2024-09-26 | End: 2024-09-27 | Stop reason: HOSPADM

## 2024-09-26 RX ORDER — SODIUM CHLORIDE 9 MG/ML
100 INJECTION, SOLUTION INTRAVENOUS CONTINUOUS
Status: DISCONTINUED | OUTPATIENT
Start: 2024-09-26 | End: 2024-09-27 | Stop reason: HOSPADM

## 2024-09-26 RX ORDER — SODIUM CHLORIDE 0.9 % (FLUSH) 0.9 %
10 SYRINGE (ML) INJECTION EVERY 12 HOURS SCHEDULED
Status: DISCONTINUED | OUTPATIENT
Start: 2024-09-26 | End: 2024-09-27 | Stop reason: HOSPADM

## 2024-09-26 RX ORDER — PROCHLORPERAZINE EDISYLATE 5 MG/ML
5 INJECTION INTRAMUSCULAR; INTRAVENOUS EVERY 6 HOURS PRN
Status: DISCONTINUED | OUTPATIENT
Start: 2024-09-26 | End: 2024-09-27 | Stop reason: HOSPADM

## 2024-09-26 RX ORDER — ONDANSETRON 2 MG/ML
8 INJECTION INTRAMUSCULAR; INTRAVENOUS EVERY 6 HOURS PRN
Status: DISCONTINUED | OUTPATIENT
Start: 2024-09-26 | End: 2024-09-27 | Stop reason: HOSPADM

## 2024-09-26 RX ORDER — BISACODYL 5 MG/1
5 TABLET, DELAYED RELEASE ORAL DAILY PRN
Status: DISCONTINUED | OUTPATIENT
Start: 2024-09-26 | End: 2024-09-27 | Stop reason: HOSPADM

## 2024-09-26 RX ORDER — AMOXICILLIN 250 MG
2 CAPSULE ORAL 2 TIMES DAILY PRN
Status: DISCONTINUED | OUTPATIENT
Start: 2024-09-26 | End: 2024-09-27 | Stop reason: HOSPADM

## 2024-09-26 RX ORDER — IOPAMIDOL 755 MG/ML
100 INJECTION, SOLUTION INTRAVASCULAR
Status: COMPLETED | OUTPATIENT
Start: 2024-09-26 | End: 2024-09-26

## 2024-09-26 RX ORDER — SODIUM CHLORIDE 0.9 % (FLUSH) 0.9 %
10 SYRINGE (ML) INJECTION AS NEEDED
Status: DISCONTINUED | OUTPATIENT
Start: 2024-09-26 | End: 2024-09-27 | Stop reason: HOSPADM

## 2024-09-26 RX ORDER — POLYETHYLENE GLYCOL 3350 17 G/17G
17 POWDER, FOR SOLUTION ORAL DAILY PRN
Status: DISCONTINUED | OUTPATIENT
Start: 2024-09-26 | End: 2024-09-27 | Stop reason: HOSPADM

## 2024-09-26 RX ORDER — BISACODYL 10 MG
10 SUPPOSITORY, RECTAL RECTAL DAILY PRN
Status: DISCONTINUED | OUTPATIENT
Start: 2024-09-26 | End: 2024-09-27 | Stop reason: HOSPADM

## 2024-09-26 RX ORDER — FAMOTIDINE 20 MG/1
20 TABLET, FILM COATED ORAL DAILY
Status: DISCONTINUED | OUTPATIENT
Start: 2024-09-27 | End: 2024-09-27 | Stop reason: HOSPADM

## 2024-09-26 RX ORDER — SODIUM CHLORIDE 9 MG/ML
40 INJECTION, SOLUTION INTRAVENOUS AS NEEDED
Status: DISCONTINUED | OUTPATIENT
Start: 2024-09-26 | End: 2024-09-27 | Stop reason: HOSPADM

## 2024-09-26 RX ADMIN — SODIUM CHLORIDE 100 ML/HR: 9 INJECTION, SOLUTION INTRAVENOUS at 23:34

## 2024-09-26 RX ADMIN — SODIUM CHLORIDE 1000 ML: 9 INJECTION, SOLUTION INTRAVENOUS at 18:24

## 2024-09-26 RX ADMIN — ONDANSETRON 4 MG: 2 INJECTION, SOLUTION INTRAMUSCULAR; INTRAVENOUS at 20:08

## 2024-09-26 RX ADMIN — IOPAMIDOL 100 ML: 755 INJECTION, SOLUTION INTRAVENOUS at 20:04

## 2024-09-26 RX ADMIN — ONDANSETRON 4 MG: 2 INJECTION, SOLUTION INTRAMUSCULAR; INTRAVENOUS at 18:24

## 2024-09-26 RX ADMIN — HYDRALAZINE HYDROCHLORIDE 10 MG: 20 INJECTION, SOLUTION INTRAMUSCULAR; INTRAVENOUS at 23:34

## 2024-09-26 NOTE — Clinical Note
Level of Care: Med/Surg [1]   Diagnosis: Intractable nausea and vomiting [338415]   Admitting Physician: VINH SWENSON [[id number]   Attending Physician: VINH SWENSON [[id number]]

## 2024-09-26 NOTE — ED NOTES
Pt reports n/v since yesterday, pt c/o LUQ abdominal pain yesterday-this is better today. Denies any fevers, diarrhea or dysuria.

## 2024-09-26 NOTE — ED PROVIDER NOTES
Subjective   History of Present Illness  Chief complaint: Vomiting    84-year-old female presents with nausea and vomiting.  Patient states symptoms started yesterday.  She states she has not been able to keep anything down.  She had some left-sided abdominal pain yesterday but it has gotten somewhat better today.  She denies diarrhea.  She has had no fever.  No known sick contacts.    History provided by:  Patient      Review of Systems   Constitutional:  Negative for fever.   HENT:  Negative for congestion.    Respiratory:  Negative for cough and shortness of breath.    Cardiovascular:  Negative for chest pain.   Gastrointestinal:  Positive for abdominal pain, nausea and vomiting. Negative for diarrhea.   Genitourinary:  Negative for dysuria.   Musculoskeletal:  Negative for back pain.   Neurological:  Negative for headaches.   Psychiatric/Behavioral:  Negative for confusion.        Past Medical History:   Diagnosis Date    Anemia     Anxiety     Arthritis     Depression     GERD (gastroesophageal reflux disease)     Hypertension     Pulmonary hypertension 3/19/2018    Raynauds disease     Shortness of breath 7/20/2016    Thyroid disorder        No Known Allergies    Past Surgical History:   Procedure Laterality Date    APPENDECTOMY N/A 1985    Dr. Adame    BLADDER REPAIR  04/2019    BREAST BIOPSY Left 2012    BENIGN    CARDIAC CATHETERIZATION N/A 6/30/2023    Procedure: Right and Left Heart Cath with Coronar Angiography;  Surgeon: Brian Malone MD;  Location: [facility] CATH INVASIVE LOCATION;  Service: Cardiovascular;  Laterality: N/A;    CATARACT EXTRACTION  2008    Dr. Shaffer    COLONOSCOPY N/A 2014    ENDOSCOPY N/A 6/30/2023    Procedure: ESOPHAGOGASTRODUODENOSCOPY with Dilation;  Surgeon: Scooter Bailey MD;  Location: [facility] ENDOSCOPY;  Service: Gastroenterology;  Laterality: N/A;  Post- Hiatal hernia, achalasia, food in esophagus    RECTAL PROLAPSE REPAIR  04/2019    THYROIDECTOMY, PARTIAL  2002    UPPER  "GASTROINTESTINAL ENDOSCOPY N/A 2017    Dr. Huerta       Family History   Problem Relation Age of Onset    No Known Problems Mother     Heart disease Father     Ovarian cancer Sister     Colon polyps Sister     Colon cancer Brother     Colon polyps Brother        Social History     Socioeconomic History    Marital status:    Tobacco Use    Smoking status: Former     Current packs/day: 0.00     Types: Cigarettes     Quit date:      Years since quittin.7     Passive exposure: Past    Smokeless tobacco: Never   Vaping Use    Vaping status: Never Used   Substance and Sexual Activity    Alcohol use: Yes     Alcohol/week: 7.0 standard drinks of alcohol     Types: 7 Glasses of wine per week    Drug use: Never    Sexual activity: Defer       /100   Pulse 75   Temp 97.9 °F (36.6 °C) (Oral)   Resp 17   Ht 149.9 cm (59\")   Wt 46.8 kg (103 lb 2.8 oz)   SpO2 (!) 74%   BMI 20.84 kg/m²       Objective   Physical Exam  Vitals and nursing note reviewed.   Constitutional:       Appearance: Normal appearance.   HENT:      Head: Normocephalic and atraumatic.      Mouth/Throat:      Mouth: Mucous membranes are moist.   Cardiovascular:      Rate and Rhythm: Normal rate and regular rhythm.      Heart sounds: Normal heart sounds.   Pulmonary:      Effort: Pulmonary effort is normal. No respiratory distress.      Breath sounds: Normal breath sounds.   Abdominal:      General: Bowel sounds are normal.      Palpations: Abdomen is soft.      Tenderness: There is no abdominal tenderness.   Skin:     General: Skin is warm and dry.   Neurological:      Mental Status: She is alert and oriented to person, place, and time.         Procedures           ED Course      Results for orders placed or performed during the hospital encounter of 24   Comprehensive Metabolic Panel    Specimen: Arm, Left; Blood   Result Value Ref Range    Glucose 141 (H) 65 - 99 mg/dL    BUN 15 8 - 23 mg/dL    Creatinine 0.64 0.57 - 1.00 " mg/dL    Sodium 138 136 - 145 mmol/L    Potassium 4.1 3.5 - 5.2 mmol/L    Chloride 101 98 - 107 mmol/L    CO2 23.3 22.0 - 29.0 mmol/L    Calcium 10.3 8.6 - 10.5 mg/dL    Total Protein 7.0 6.0 - 8.5 g/dL    Albumin 4.5 3.5 - 5.2 g/dL    ALT (SGPT) 5 1 - 33 U/L    AST (SGOT) 20 1 - 32 U/L    Alkaline Phosphatase 66 39 - 117 U/L    Total Bilirubin 0.9 0.0 - 1.2 mg/dL    Globulin 2.5 gm/dL    A/G Ratio 1.8 g/dL    BUN/Creatinine Ratio 23.4 7.0 - 25.0    Anion Gap 13.7 5.0 - 15.0 mmol/L    eGFR 87.8 >60.0 mL/min/1.73   Lipase    Specimen: Arm, Left; Blood   Result Value Ref Range    Lipase 14 13 - 60 U/L   Urinalysis With Microscopic If Indicated (No Culture) - Urine, Clean Catch    Specimen: Urine, Clean Catch   Result Value Ref Range    Color, UA Yellow Yellow, Straw    Appearance, UA Clear Clear    pH, UA 6.5 5.0 - 8.0    Specific Gravity, UA 1.016 1.005 - 1.030    Glucose, UA Negative Negative    Ketones, UA 40 mg/dL (2+) (A) Negative    Bilirubin, UA Negative Negative    Blood, UA Negative Negative    Protein, UA 30 mg/dL (1+) (A) Negative    Leuk Esterase, UA Trace (A) Negative    Nitrite, UA Negative Negative    Urobilinogen, UA 1.0 E.U./dL 0.2 - 1.0 E.U./dL   CBC Auto Differential    Specimen: Arm, Left; Blood   Result Value Ref Range    WBC 7.83 3.40 - 10.80 10*3/mm3    RBC 4.22 3.77 - 5.28 10*6/mm3    Hemoglobin 13.4 12.0 - 15.9 g/dL    Hematocrit 41.7 34.0 - 46.6 %    MCV 98.8 (H) 79.0 - 97.0 fL    MCH 31.8 26.6 - 33.0 pg    MCHC 32.1 31.5 - 35.7 g/dL    RDW 14.9 12.3 - 15.4 %    RDW-SD 54.0 37.0 - 54.0 fl    MPV 9.5 6.0 - 12.0 fL    Platelets 280 140 - 450 10*3/mm3    Neutrophil % 89.2 (H) 42.7 - 76.0 %    Lymphocyte % 4.9 (L) 19.6 - 45.3 %    Monocyte % 5.4 5.0 - 12.0 %    Eosinophil % 0.0 (L) 0.3 - 6.2 %    Basophil % 0.1 0.0 - 1.5 %    Immature Grans % 0.4 0.0 - 0.5 %    Neutrophils, Absolute 6.99 1.70 - 7.00 10*3/mm3    Lymphocytes, Absolute 0.38 (L) 0.70 - 3.10 10*3/mm3    Monocytes, Absolute 0.42 0.10 -  0.90 10*3/mm3    Eosinophils, Absolute 0.00 0.00 - 0.40 10*3/mm3    Basophils, Absolute 0.01 0.00 - 0.20 10*3/mm3    Immature Grans, Absolute 0.03 0.00 - 0.05 10*3/mm3    nRBC 0.0 0.0 - 0.2 /100 WBC   Urinalysis, Microscopic Only - Urine, Clean Catch    Specimen: Urine, Clean Catch   Result Value Ref Range    RBC, UA 3-5 (A) None Seen, 0-2 /HPF    WBC, UA 3-5 (A) None Seen, 0-2 /HPF    Bacteria, UA None Seen None Seen /HPF    Squamous Epithelial Cells, UA 3-6 (A) None Seen, 0-2 /HPF    Hyaline Casts, UA None Seen None Seen /LPF    Methodology Automated Microscopy      CT Abdomen Pelvis With Contrast    Result Date: 9/26/2024  Impression: No acute findings in the abdomen or pelvis. Formed stool throughout the colon, correlate for constipation. Unchanged large hiatal hernia containing the entire stomach. Small volume right-sided pleural fluid along the fissure. Unchanged additional chronic/nonemergent findings as above. Electronically Signed: Thien Lr MD  9/26/2024 8:17 PM EDT  Workstation ID: [id number]                                          Medical Decision Making  Amount and/or Complexity of Data Reviewed  Labs: ordered.  Radiology: ordered.    Risk  Prescription drug management.      Patient had the above valuation.  Results were discussed with the patient.  White blood cell count was normal.  CMP and lipase are unremarkable.  Urinalysis does show 40 ketones with no evidence of UTI.  CT of the abdomen and pelvis shows no acute findings.  Patient was given IV fluids as well as 2 doses of Zofran.  She attempted to orally hydrate but had additional nausea and vomiting.  I discussed with the nurse practitioner on-call for the hospitalist and the patient will be admitted for further evaluation and management.      Final diagnoses:   Intractable nausea and vomiting       ED Disposition  ED Disposition       ED Disposition   Decision to Admit    Condition   --    Comment   Level of Care: Med/Surg [1]   Admitting  Physician: LLANES ALVAREZ, CARLOS [[id number]]   Attending Physician: LLANES ALVAREZ, CARLOS [[id number]]                 No follow-up provider specified.       Medication List      No changes were made to your prescriptions during this visit.            Timothy Tinoco MD  09/26/24 8844

## 2024-09-27 VITALS
WEIGHT: 103.17 LBS | HEIGHT: 59 IN | OXYGEN SATURATION: 100 % | BODY MASS INDEX: 20.8 KG/M2 | TEMPERATURE: 97.4 F | RESPIRATION RATE: 20 BRPM | HEART RATE: 72 BPM | DIASTOLIC BLOOD PRESSURE: 77 MMHG | SYSTOLIC BLOOD PRESSURE: 120 MMHG

## 2024-09-27 LAB
ANION GAP SERPL CALCULATED.3IONS-SCNC: 14.9 MMOL/L (ref 5–15)
BASOPHILS # BLD AUTO: 0.02 10*3/MM3 (ref 0–0.2)
BASOPHILS NFR BLD AUTO: 0.2 % (ref 0–1.5)
BUN SERPL-MCNC: 14 MG/DL (ref 8–23)
BUN/CREAT SERPL: 23.7 (ref 7–25)
CALCIUM SPEC-SCNC: 9.8 MG/DL (ref 8.6–10.5)
CHLORIDE SERPL-SCNC: 103 MMOL/L (ref 98–107)
CO2 SERPL-SCNC: 22.1 MMOL/L (ref 22–29)
CREAT SERPL-MCNC: 0.59 MG/DL (ref 0.57–1)
DEPRECATED RDW RBC AUTO: 54.7 FL (ref 37–54)
EGFRCR SERPLBLD CKD-EPI 2021: 89.6 ML/MIN/1.73
EOSINOPHIL # BLD AUTO: 0 10*3/MM3 (ref 0–0.4)
EOSINOPHIL NFR BLD AUTO: 0 % (ref 0.3–6.2)
ERYTHROCYTE [DISTWIDTH] IN BLOOD BY AUTOMATED COUNT: 15 % (ref 12.3–15.4)
GLUCOSE SERPL-MCNC: 133 MG/DL (ref 65–99)
HCT VFR BLD AUTO: 41.9 % (ref 34–46.6)
HGB BLD-MCNC: 13.2 G/DL (ref 12–15.9)
IMM GRANULOCYTES # BLD AUTO: 0.05 10*3/MM3 (ref 0–0.05)
IMM GRANULOCYTES NFR BLD AUTO: 0.5 % (ref 0–0.5)
LYMPHOCYTES # BLD AUTO: 0.42 10*3/MM3 (ref 0.7–3.1)
LYMPHOCYTES NFR BLD AUTO: 3.8 % (ref 19.6–45.3)
MCH RBC QN AUTO: 31.1 PG (ref 26.6–33)
MCHC RBC AUTO-ENTMCNC: 31.5 G/DL (ref 31.5–35.7)
MCV RBC AUTO: 98.8 FL (ref 79–97)
MONOCYTES # BLD AUTO: 0.76 10*3/MM3 (ref 0.1–0.9)
MONOCYTES NFR BLD AUTO: 6.9 % (ref 5–12)
NEUTROPHILS NFR BLD AUTO: 88.6 % (ref 42.7–76)
NEUTROPHILS NFR BLD AUTO: 9.77 10*3/MM3 (ref 1.7–7)
NRBC BLD AUTO-RTO: 0 /100 WBC (ref 0–0.2)
PLATELET # BLD AUTO: 258 10*3/MM3 (ref 140–450)
PMV BLD AUTO: 9.6 FL (ref 6–12)
POTASSIUM SERPL-SCNC: 3.6 MMOL/L (ref 3.5–5.2)
QT INTERVAL: 395 MS
QTC INTERVAL: 495 MS
RBC # BLD AUTO: 4.24 10*6/MM3 (ref 3.77–5.28)
SODIUM SERPL-SCNC: 140 MMOL/L (ref 136–145)
WBC NRBC COR # BLD AUTO: 11.02 10*3/MM3 (ref 3.4–10.8)

## 2024-09-27 PROCEDURE — 80048 BASIC METABOLIC PNL TOTAL CA: CPT

## 2024-09-27 PROCEDURE — 94664 DEMO&/EVAL PT USE INHALER: CPT

## 2024-09-27 PROCEDURE — G0378 HOSPITAL OBSERVATION PER HR: HCPCS

## 2024-09-27 PROCEDURE — 94799 UNLISTED PULMONARY SVC/PX: CPT

## 2024-09-27 PROCEDURE — 96361 HYDRATE IV INFUSION ADD-ON: CPT

## 2024-09-27 PROCEDURE — 25010000002 PROCHLORPERAZINE 10 MG/2ML SOLUTION

## 2024-09-27 PROCEDURE — 94640 AIRWAY INHALATION TREATMENT: CPT

## 2024-09-27 PROCEDURE — 93005 ELECTROCARDIOGRAM TRACING: CPT | Performed by: INTERNAL MEDICINE

## 2024-09-27 PROCEDURE — 94761 N-INVAS EAR/PLS OXIMETRY MLT: CPT

## 2024-09-27 PROCEDURE — 85025 COMPLETE CBC W/AUTO DIFF WBC: CPT

## 2024-09-27 PROCEDURE — 96375 TX/PRO/DX INJ NEW DRUG ADDON: CPT

## 2024-09-27 RX ORDER — BRIMONIDINE TARTRATE 2 MG/ML
1 SOLUTION/ DROPS OPHTHALMIC EVERY 8 HOURS SCHEDULED
Status: DISCONTINUED | OUTPATIENT
Start: 2024-09-27 | End: 2024-09-27 | Stop reason: HOSPADM

## 2024-09-27 RX ORDER — BUDESONIDE 0.5 MG/2ML
0.5 INHALANT ORAL
Status: DISCONTINUED | OUTPATIENT
Start: 2024-09-27 | End: 2024-09-27 | Stop reason: HOSPADM

## 2024-09-27 RX ORDER — ARFORMOTEROL TARTRATE 15 UG/2ML
15 SOLUTION RESPIRATORY (INHALATION)
Status: DISCONTINUED | OUTPATIENT
Start: 2024-09-27 | End: 2024-09-27 | Stop reason: HOSPADM

## 2024-09-27 RX ORDER — ONDANSETRON 4 MG/1
4 TABLET, ORALLY DISINTEGRATING ORAL EVERY 8 HOURS PRN
Qty: 20 TABLET | Refills: 0 | Status: SHIPPED | OUTPATIENT
Start: 2024-09-27

## 2024-09-27 RX ADMIN — ARFORMOTEROL TARTRATE 15 MCG: 15 SOLUTION RESPIRATORY (INHALATION) at 10:05

## 2024-09-27 RX ADMIN — BUDESONIDE 0.5 MG: 0.5 INHALANT RESPIRATORY (INHALATION) at 10:05

## 2024-09-27 RX ADMIN — PROCHLORPERAZINE EDISYLATE 5 MG: 5 INJECTION INTRAMUSCULAR; INTRAVENOUS at 00:39

## 2024-09-27 RX ADMIN — Medication 10 ML: at 08:57

## 2024-09-27 RX ADMIN — BRIMONIDINE TARTRATE 1 DROP: 2 SOLUTION OPHTHALMIC at 08:56

## 2024-09-27 NOTE — CASE MANAGEMENT/SOCIAL WORK
Discharge Planning Assessment   Taj     Patient Name: Anitar Nobles  MRN: [id number]  Today's Date: 9/27/2024    Admit Date: 9/26/2024    Plan: From home alone. Home O2 per [place].   Discharge Needs Assessment       Row Name 09/27/24 0916       Living Environment    People in Home alone    Current Living Arrangements home    Potentially Unsafe Housing Conditions none    In the past 12 months has the electric, gas, oil, or water company threatened to shut off services in your home? No    Primary Care Provided by self    Provides Primary Care For no one    Family Caregiver if Needed child(raheem), adult    Family Caregiver Names Daughter Tere    Quality of Family Relationships unable to assess    Able to Return to Prior Arrangements yes       Resource/Environmental Concerns    Resource/Environmental Concerns none    Transportation Concerns none       Transportation Needs    In the past 12 months, has lack of transportation kept you from medical appointments or from getting medications? no    In the past 12 months, has lack of transportation kept you from meetings, work, or from getting things needed for daily living? No       Food Insecurity    Within the past 12 months, you worried that your food would run out before you got the money to buy more. Never true    Within the past 12 months, the food you bought just didn't last and you didn't have money to get more. Never true       Transition Planning    Patient/Family Anticipates Transition to home    Patient/Family Anticipated Services at Transition none    Transportation Anticipated family or friend will provide       Discharge Needs Assessment    Readmission Within the Last 30 Days no previous admission in last 30 days    Equipment Currently Used at Home oxygen    Concerns to be Addressed no discharge needs identified;denies needs/concerns at this time    Anticipated Changes Related to Illness none    Equipment Needed After Discharge none    Provided Post Acute  Provider List? N/A    Provided Post Acute Provider Quality & Resource List? N/A                   Discharge Plan       Row Name 09/27/24 0917       Plan    Plan From home alone. Home O2 per [place].    Patient/Family in Agreement with Plan yes    Provided Post Acute Provider List? N/A    Provided Post Acute Provider Quality & Resource List? N/A    Plan Comments CM met with patient at the bedside to review discharge planning. Patient lives at home alone, is IADLs and drives. Daughter, Tere, to transport patient at d/c. Confirmed PCP, insurance, and pharmacy. Patient declines [facility]. Patient denies any difficulty affording food, utilities, or medications. Patient attends OP Respiratory Therapy at [facility] 2x/week. DC Barriers: IVF, IV antiemetics                  Continued Care and Services - Admitted Since 9/26/2024       Durable Medical Equipment Coordination complete.      Service Provider Request Status Selected Services Address Phone Fax Patient Preferred    [facility]  Selected Durable Medical Equipment [address], [facility] [address] [phone] --              Therapy Coordination complete.      Service Provider Request Status Selected Services Address Phone Fax Patient Preferred    [facility] OUTPATIENT THERAPY  Selected Outpatient Rehabilitation [address] [facility] [place] [address] [phone] --       Internal Comment last updated by Angelica Singh RN 9/27/2024 0924    OP Respiratory Therapy                                Demographic Summary       Row Name 09/27/24 0916       General Information    Admission Type observation    Arrived From emergency department    Required Notices Provided Observation Status Notice    Referral Source admission list    Reason for Consult discharge planning    Preferred Language English       Contact Information    Permission Granted to Share Info With     Contact Information Obtained  for                    Functional Status       Row Name 09/27/24 0916       Functional Status    Usual Activity Tolerance good    Current Activity Tolerance good       Functional Status, IADL    Medications independent    Meal Preparation independent    Housekeeping independent    Laundry independent    Shopping independent       Mental Status    General Appearance WDL WDL       Mental Status Summary    Recent Changes in Mental Status/Cognitive Functioning no changes              Patient Forms       Row Name 09/27/24 0817       Patient Forms    Important Message from Medicare (IMM) Delivered  MOON per Reg 9/27               Angelica Singh RN     [phone]  [email or web address]

## 2024-09-27 NOTE — PLAN OF CARE
Problem: Adult Inpatient Plan of Care  Goal: Plan of Care Review  Outcome: Not Progressing  Goal: Patient-Specific Goal (Individualized)  Outcome: Not Progressing  Goal: Absence of Hospital-Acquired Illness or Injury  Outcome: Not Progressing  Intervention: Identify and Manage Fall Risk  Recent Flowsheet Documentation  Taken 9/27/2024 0133 by Rhonda Jefferson RN  Safety Promotion/Fall Prevention:   safety round/check completed   room organization consistent   nonskid shoes/slippers when out of bed  Taken 9/26/2024 2200 by Rhonda Jefferson RN  Safety Promotion/Fall Prevention:   safety round/check completed   room organization consistent   nonskid shoes/slippers when out of bed   fall prevention program maintained  Intervention: Prevent Skin Injury  Recent Flowsheet Documentation  Taken 9/26/2024 2200 by Rhonda Jefferson RN  Body Position: position changed independently  Intervention: Prevent and Manage VTE (Venous Thromboembolism) Risk  Recent Flowsheet Documentation  Taken 9/26/2024 2200 by Rhonda Jefferson RN  Activity Management: activity minimized  Goal: Optimal Comfort and Wellbeing  Outcome: Not Progressing  Intervention: Provide Person-Centered Care  Recent Flowsheet Documentation  Taken 9/26/2024 2200 by Rhonda Jefferson RN  Trust Relationship/Rapport:   care explained   reassurance provided  Goal: Readiness for Transition of Care  Outcome: Not Progressing  Intervention: Mutually Develop Transition Plan  Recent Flowsheet Documentation  Taken 9/26/2024 2302 by Rhonda Jefferson RN  Transportation Anticipated: family or friend will provide  Taken 9/26/2024 2301 by Rhonda Jefferson RN  Equipment Currently Used at Home: none  Patient/Family Anticipated Services at Transition: none  Patient/Family Anticipates Transition to: home   Goal Outcome Evaluation:

## 2024-09-27 NOTE — NURSING NOTE
Pt resting in bed with complaints of continuous nausea, compazine given no emesis episodes noted on shift. RN noticed rhythm change with heart blocks and short run of SVT. provider and charge RN notified. EKG obtained,patient reports no pain or other complaints.

## 2024-09-27 NOTE — H&P
Patient Care Team:  Guillermo Wynn MD as PCP - General (Family Medicine)  Mirna Wynn MD as PCP - Family Medicine  Brian Malone MD as Consulting Physician (Cardiology)    Chief complaint Nausea and vomiting    Subjective     Patient is a 84 y.o. female who presented to the ER with complaints of nausea and vomiting since yesterday. She has not been able to keep much of anything down since yesterday. She does reports some left sided abdominal discomfort that has improved today. She denies any fever, chills, diarrhea, chest pain, or shortness of breath.   ER course: CT A/P with no acute findings, does show some stool burden and unchanged hiatal hernia. WBC, lipase, and cmp all normal. UA with 40 ketones with no signs of infection. Patient was given IV zofran but was unable to still keep any fluids down.       Onset of symptoms was 2 days    Review of Systems   Constitutional: Negative.    HENT: Negative.     Eyes: Negative.    Respiratory: Negative.     Cardiovascular: Negative.    Gastrointestinal:  Positive for abdominal pain, nausea and vomiting.   Endocrine: Negative.    Genitourinary: Negative.    Musculoskeletal: Negative.    Neurological: Negative.    Psychiatric/Behavioral: Negative.            History  Past Medical History:   Diagnosis Date    Anemia     Anxiety     Arthritis     Depression     GERD (gastroesophageal reflux disease)     Hypertension     Pulmonary hypertension 3/19/2018    Raynauds disease     Shortness of breath 7/20/2016    Thyroid disorder      Past Surgical History:   Procedure Laterality Date    APPENDECTOMY N/A 1985    Dr. Adame    BLADDER REPAIR  04/2019    BREAST BIOPSY Left 2012    BENIGN    CARDIAC CATHETERIZATION N/A 6/30/2023    Procedure: Right and Left Heart Cath with Coronar Angiography;  Surgeon: Brian Malone MD;  Location: [facility] INVASIVE LOCATION;  Service: Cardiovascular;  Laterality: N/A;    CATARACT EXTRACTION  2008    Dr. Shaffer    COLONOSCOPY N/A 2014     ENDOSCOPY N/A 2023    Procedure: ESOPHAGOGASTRODUODENOSCOPY with Dilation;  Surgeon: Scooter Bailey MD;  Location: [facility] ENDOSCOPY;  Service: Gastroenterology;  Laterality: N/A;  Post- Hiatal hernia, achalasia, food in esophagus    RECTAL PROLAPSE REPAIR  2019    THYROIDECTOMY, PARTIAL  2002    UPPER GASTROINTESTINAL ENDOSCOPY N/A     Dr. Huerta     Family History   Problem Relation Age of Onset    No Known Problems Mother     Heart disease Father     Ovarian cancer Sister     Colon polyps Sister     Colon cancer Brother     Colon polyps Brother      Social History     Tobacco Use    Smoking status: Former     Current packs/day: 0.00     Types: Cigarettes     Quit date:      Years since quittin.7     Passive exposure: Past    Smokeless tobacco: Never   Vaping Use    Vaping status: Never Used   Substance Use Topics    Alcohol use: Yes     Alcohol/week: 7.0 standard drinks of alcohol     Types: 7 Glasses of wine per week    Drug use: Never     (Not in a hospital admission)    Allergies:  Patient has no known allergies.    Objective     Vital Signs  Temp:  [97.9 °F (36.6 °C)] 97.9 °F (36.6 °C)  Heart Rate:  [57-75] 75  Resp:  [17-20] 17  BP: (129-183)/() 180/100     Physical Exam:      General Appearance:    Alert, cooperative, in no acute distress   Head:    Normocephalic, without obvious abnormality, atraumatic   Eyes:            Lids and lashes normal, conjunctivae and sclerae normal, no   icterus, no pallor, corneas clear, PERRLA   Ears:    Ears appear intact with no abnormalities noted   Throat:   No oral lesions, no thrush, oral mucosa moist   Neck:   No adenopathy, supple, trachea midline, no thyromegaly, no   carotid bruit, no JVD   Lungs:     Clear to auscultation,respirations regular, even and                  unlabored    Heart:    Regular rhythm and normal rate, normal S1 and S2, no            murmur, no gallop, no rub, no click   Chest Wall:    No abnormalities observed    Abdomen:     Normal bowel sounds, no masses, no organomegaly, soft        non-tender, non-distended, no guarding, no rebound                tenderness   Extremities:   Moves all extremities well, no edema, no cyanosis, no             redness   Pulses:   Pulses palpable and equal bilaterally   Skin:   No bleeding, bruising or rash   Lymph nodes:   No palpable adenopathy   Neurologic:   No focal deficits noted       Results Review:     Imaging Results (Last 24 Hours)       Procedure Component Value Units Date/Time    CT Abdomen Pelvis With Contrast [[id number]] Collected: 09/26/24 2007     Updated: 09/26/24 2020    Narrative:      CT ABDOMEN PELVIS W CONTRAST    Date of Exam: 9/26/2024 7:48 PM EDT    Indication: Left-sided abdominal pain, vomiting.    Comparison: CT abdomen pelvis 3/25/2024.    Technique: Axial CT images were obtained of the abdomen and pelvis following the uneventful intravenous administration of iodinated contrast. Sagittal and coronal reconstructions were performed.  Automated exposure control and iterative reconstruction   methods were used.    Findings:    Lower thorax: Small volume pleural fluid along the right fissure. Unchanged subcentimeter juxtapleural nodules in the right lower lobe, some which are along the fissure. Background of mild chronic changes of the lungs.    Liver: No morphologic changes of chronic liver disease. Couple circumscribed low attenuating lesions, suggestive of cysts, unchanged.    Gallbladder and bile ducts: Gallbladder is unremarkable. No biliary ductal dilatation.     Pancreas: No pancreatic duct dilation. No surrounding inflammation.     Spleen: Normal in size.     Adrenal glands: No discrete adrenal nodule.     Kidneys: Symmetric in size and enhancement. No hydronephrosis. Bilateral renal cysts.    Urinary bladder: Unremarkable.    Reproductive organs: Hysterectomy.    Stomach and bowel: Large hiatal hernia containing the entire stomach, unchanged. No evidence of  bowel obstruction. Formed stool throughout the colon.    Lymph nodes: No pathologically enlarged lymph nodes.     Vessels: No abdominal aortic aneurysm. Atherosclerosis. Major vasculature appears patent.     Peritoneum and retroperitoneum: No free air or free fluid.     Soft tissues: Unremarkable.    Osseous structures: No acute or suspicious osseous lesions. Multilevel degenerative changes of the spine. Bones appear demineralized. Severe leftward convex curvature of the lumbar spine.      Impression:      Impression:    No acute findings in the abdomen or pelvis.    Formed stool throughout the colon, correlate for constipation.    Unchanged large hiatal hernia containing the entire stomach.    Small volume right-sided pleural fluid along the fissure.    Unchanged additional chronic/nonemergent findings as above.      Electronically Signed: Thien Lr MD    9/26/2024 8:17 PM EDT    Workstation ID: [id number]             Lab Results (last 24 hours)       Procedure Component Value Units Date/Time    Urinalysis With Microscopic If Indicated (No Culture) - Urine, Clean Catch [[id number]]  (Abnormal) Collected: 09/26/24 1922    Specimen: Urine, Clean Catch Updated: 09/26/24 1934     Color, UA Yellow     Appearance, UA Clear     pH, UA 6.5     Specific Gravity, UA 1.016     Glucose, UA Negative     Ketones, UA 40 mg/dL (2+)     Bilirubin, UA Negative     Blood, UA Negative     Protein, UA 30 mg/dL (1+)     Leuk Esterase, UA Trace     Nitrite, UA Negative     Urobilinogen, UA 1.0 E.U./dL    Urinalysis, Microscopic Only - Urine, Clean Catch [[id number]]  (Abnormal) Collected: 09/26/24 1922    Specimen: Urine, Clean Catch Updated: 09/26/24 1934     RBC, UA 3-5 /HPF      WBC, UA 3-5 /HPF      Bacteria, UA None Seen /HPF      Squamous Epithelial Cells, UA 3-6 /HPF      Hyaline Casts, UA None Seen /LPF      Methodology Automated Microscopy    Comprehensive Metabolic Panel [[id number]]  (Abnormal) Collected: 09/26/24 1817     Specimen: Blood from Arm, Left Updated: 09/26/24 1848     Glucose 141 mg/dL      BUN 15 mg/dL      Creatinine 0.64 mg/dL      Sodium 138 mmol/L      Potassium 4.1 mmol/L      Comment: Slight hemolysis detected by analyzer. Result may be falsely elevated.        Chloride 101 mmol/L      CO2 23.3 mmol/L      Calcium 10.3 mg/dL      Total Protein 7.0 g/dL      Albumin 4.5 g/dL      ALT (SGPT) 5 U/L      AST (SGOT) 20 U/L      Comment: Slight hemolysis detected by analyzer. Result may be falsely elevated.        Alkaline Phosphatase 66 U/L      Total Bilirubin 0.9 mg/dL      Globulin 2.5 gm/dL      A/G Ratio 1.8 g/dL      BUN/Creatinine Ratio 23.4     Anion Gap 13.7 mmol/L      eGFR 87.8 mL/min/1.73     Narrative:      GFR Normal >60  Chronic Kidney Disease <60  Kidney Failure <15    The GFR formula is only valid for adults with stable renal function between ages 18 and 70.    Lipase [[id number]]  (Normal) Collected: 09/26/24 1817    Specimen: Blood from Arm, Left Updated: 09/26/24 1846     Lipase 14 U/L     CBC & Differential [[id number]]  (Abnormal) Collected: 09/26/24 1817    Specimen: Blood from Arm, Left Updated: 09/26/24 1823    Narrative:      The following orders were created for panel order CBC & Differential.  Procedure                               Abnormality         Status                     ---------                               -----------         ------                     CBC Auto Differential[[id number]]        Abnormal            Final result                 Please view results for these tests on the individual orders.    CBC Auto Differential [[id number]]  (Abnormal) Collected: 09/26/24 1817    Specimen: Blood from Arm, Left Updated: 09/26/24 1823     WBC 7.83 10*3/mm3      RBC 4.22 10*6/mm3      Hemoglobin 13.4 g/dL      Hematocrit 41.7 %      MCV 98.8 fL      MCH 31.8 pg      MCHC 32.1 g/dL      RDW 14.9 %      RDW-SD 54.0 fl      MPV 9.5 fL      Platelets 280 10*3/mm3      Neutrophil % 89.2 %       Lymphocyte % 4.9 %      Monocyte % 5.4 %      Eosinophil % 0.0 %      Basophil % 0.1 %      Immature Grans % 0.4 %      Neutrophils, Absolute 6.99 10*3/mm3      Lymphocytes, Absolute 0.38 10*3/mm3      Monocytes, Absolute 0.42 10*3/mm3      Eosinophils, Absolute 0.00 10*3/mm3      Basophils, Absolute 0.01 10*3/mm3      Immature Grans, Absolute 0.03 10*3/mm3      nRBC 0.0 /100 WBC              I reviewed the patient's new clinical results.    Assessment & Plan       Intractable nausea and vomiting  -CT A/P with no acute findings, some stool burden  -UA with 40 ketones, no infection  -antiemetics  -IV hydration      DVT prophylaxis-SCD's  GI prophylaxis- ppi    I discussed the patient's findings and my recommendations with patient.     Krissy Lucas, APRN  09/26/24  21:46 EDT

## 2024-10-06 NOTE — DISCHARGE SUMMARY
Date of Discharge:  9/29/2024    Discharge Diagnosis:   **Intractable nausea and vomiting [R11.2]   Hiatal hernia [K44.9]   Hyperlipidemia [E78.5]   Hypothyroidism [E03.9]   Aortic stenosis [I35.0]   Pulmonary hypertension [I27.20]       Presenting Problem/History of Present Illness  Active Hospital Problems    Diagnosis  POA    **Intractable nausea and vomiting [R11.2]  Yes    Hiatal hernia [K44.9]  Yes    Hyperlipidemia [E78.5]  Yes    Hypothyroidism [E03.9]  Yes    Aortic stenosis [I35.0]  Yes    Pulmonary hypertension [I27.20]  Yes      Resolved Hospital Problems   No resolved problems to display.          Hospital Course  Patient is a 84 y.o. female presented with sudden onset of nausea and vomiting without diarrhea.  She was treated with IV fluids and antiemetics with good results.  She tolerated a full liquid diet without vomiting and was eager to be discharged.  Exam is unremarkable.      Procedures Performed         Consults:   Consults       No orders found from 8/28/2024 to 9/27/2024.            Pertinent Test Results:    Lab Results (most recent)       Procedure Component Value Units Date/Time    Basic Metabolic Panel [[id number]]  (Abnormal) Collected: 09/27/24 0243    Specimen: Blood from Arm, Left Updated: 09/27/24 0310     Glucose 133 mg/dL      BUN 14 mg/dL      Creatinine 0.59 mg/dL      Sodium 140 mmol/L      Potassium 3.6 mmol/L      Comment: Specimen hemolyzed.  Result may be falsely elevated.        Chloride 103 mmol/L      CO2 22.1 mmol/L      Calcium 9.8 mg/dL      BUN/Creatinine Ratio 23.7     Anion Gap 14.9 mmol/L      eGFR 89.6 mL/min/1.73     Narrative:      GFR Normal >60  Chronic Kidney Disease <60  Kidney Failure <15    The GFR formula is only valid for adults with stable renal function between ages 18 and 70.    CBC & Differential [[id number]]  (Abnormal) Collected: 09/27/24 0153    Specimen: Blood from Arm, Left Updated: 09/27/24 0157    Narrative:      The following orders were  created for panel order CBC & Differential.  Procedure                               Abnormality         Status                     ---------                               -----------         ------                     CBC Auto Differential[[id number]]        Abnormal            Final result                 Please view results for these tests on the individual orders.    CBC Auto Differential [[id number]]  (Abnormal) Collected: 09/27/24 0153    Specimen: Blood from Arm, Left Updated: 09/27/24 0157     WBC 11.02 10*3/mm3      RBC 4.24 10*6/mm3      Hemoglobin 13.2 g/dL      Hematocrit 41.9 %      MCV 98.8 fL      MCH 31.1 pg      MCHC 31.5 g/dL      RDW 15.0 %      RDW-SD 54.7 fl      MPV 9.6 fL      Platelets 258 10*3/mm3      Neutrophil % 88.6 %      Lymphocyte % 3.8 %      Monocyte % 6.9 %      Eosinophil % 0.0 %      Basophil % 0.2 %      Immature Grans % 0.5 %      Neutrophils, Absolute 9.77 10*3/mm3      Lymphocytes, Absolute 0.42 10*3/mm3      Monocytes, Absolute 0.76 10*3/mm3      Eosinophils, Absolute 0.00 10*3/mm3      Basophils, Absolute 0.02 10*3/mm3      Immature Grans, Absolute 0.05 10*3/mm3      nRBC 0.0 /100 WBC     Urinalysis With Microscopic If Indicated (No Culture) - Urine, Clean Catch [[id number]]  (Abnormal) Collected: 09/26/24 1922    Specimen: Urine, Clean Catch Updated: 09/26/24 1934     Color, UA Yellow     Appearance, UA Clear     pH, UA 6.5     Specific Gravity, UA 1.016     Glucose, UA Negative     Ketones, UA 40 mg/dL (2+)     Bilirubin, UA Negative     Blood, UA Negative     Protein, UA 30 mg/dL (1+)     Leuk Esterase, UA Trace     Nitrite, UA Negative     Urobilinogen, UA 1.0 E.U./dL    Urinalysis, Microscopic Only - Urine, Clean Catch [[id number]]  (Abnormal) Collected: 09/26/24 1922    Specimen: Urine, Clean Catch Updated: 09/26/24 1934     RBC, UA 3-5 /HPF      WBC, UA 3-5 /HPF      Bacteria, UA None Seen /HPF      Squamous Epithelial Cells, UA 3-6 /HPF      Hyaline Casts, UA None Seen  /LPF      Methodology Automated Microscopy    Comprehensive Metabolic Panel [[id number]]  (Abnormal) Collected: 09/26/24 1817    Specimen: Blood from Arm, Left Updated: 09/26/24 1848     Glucose 141 mg/dL      BUN 15 mg/dL      Creatinine 0.64 mg/dL      Sodium 138 mmol/L      Potassium 4.1 mmol/L      Comment: Slight hemolysis detected by analyzer. Result may be falsely elevated.        Chloride 101 mmol/L      CO2 23.3 mmol/L      Calcium 10.3 mg/dL      Total Protein 7.0 g/dL      Albumin 4.5 g/dL      ALT (SGPT) 5 U/L      AST (SGOT) 20 U/L      Comment: Slight hemolysis detected by analyzer. Result may be falsely elevated.        Alkaline Phosphatase 66 U/L      Total Bilirubin 0.9 mg/dL      Globulin 2.5 gm/dL      A/G Ratio 1.8 g/dL      BUN/Creatinine Ratio 23.4     Anion Gap 13.7 mmol/L      eGFR 87.8 mL/min/1.73     Narrative:      GFR Normal >60  Chronic Kidney Disease <60  Kidney Failure <15    The GFR formula is only valid for adults with stable renal function between ages 18 and 70.    Lipase [[id number]]  (Normal) Collected: 09/26/24 1817    Specimen: Blood from Arm, Left Updated: 09/26/24 1846     Lipase 14 U/L     CBC & Differential [[id number]]  (Abnormal) Collected: 09/26/24 1817    Specimen: Blood from Arm, Left Updated: 09/26/24 1823    Narrative:      The following orders were created for panel order CBC & Differential.  Procedure                               Abnormality         Status                     ---------                               -----------         ------                     CBC Auto Differential[[id number]]        Abnormal            Final result                 Please view results for these tests on the individual orders.    CBC Auto Differential [[id number]]  (Abnormal) Collected: 09/26/24 1817    Specimen: Blood from Arm, Left Updated: 09/26/24 1823     WBC 7.83 10*3/mm3      RBC 4.22 10*6/mm3      Hemoglobin 13.4 g/dL      Hematocrit 41.7 %      MCV 98.8 fL      MCH 31.8 pg       MCHC 32.1 g/dL      RDW 14.9 %      RDW-SD 54.0 fl      MPV 9.5 fL      Platelets 280 10*3/mm3      Neutrophil % 89.2 %      Lymphocyte % 4.9 %      Monocyte % 5.4 %      Eosinophil % 0.0 %      Basophil % 0.1 %      Immature Grans % 0.4 %      Neutrophils, Absolute 6.99 10*3/mm3      Lymphocytes, Absolute 0.38 10*3/mm3      Monocytes, Absolute 0.42 10*3/mm3      Eosinophils, Absolute 0.00 10*3/mm3      Basophils, Absolute 0.01 10*3/mm3      Immature Grans, Absolute 0.03 10*3/mm3      nRBC 0.0 /100 WBC              Results for orders placed during the hospital encounter of 06/30/23    Adult Transesophageal Echo (QAMAR) W/ Cont if Necessary Per Protocol    Interpretation Summary  Date of procedure  6/30/2023    Indications  Mitral regurgitation  Aortic valve stenosis  Shortness of breath    Procedure performed  Transesophageal echocardiogram (attempted) and Doppler study    Procedure  Anesthesia was provided by anesthesiologist with intravenous Diprivan.  An attempt was made to pass QAMAR probe but could not be advanced beyond 25 cm and patient has history of dysphagia and only gentle pressure was used but could not advance the QAMAR probe.  QAMAR was abandoned at this time and GI consultation for upper endoscopy was obtained.  Patient had upper endoscopy and revealed the following results.  Proceed with cardiac catheterization.      Impression:  1.  Esophagus was full of liquid and some semisolid food and was massively dilated with severe ulcerations and signs of stasis throughout the entire esophagus.  At 25 cm from the incisors, there is a very sharp turn at 90 degrees followed by another 90 degree turn indicating a very tortuous esophagus.  There is no stricture in the esophagus was widely patent into the stomach at 30 cm.  2.  Very large hiatal hernia with half of the stomach and the patient's chest.  There was food sitting in the hiatal hernia.  It was a very tight narrowing entering the distal stomach through the  diaphragmatic hiatus which is likely why the hiatal hernia is pocketed with food  3.  Some larger gastric polyps with the appearance of hyperplastic polyps in the fundus were present and some in the hiatal hernia itself  4.  Normal duodenal mucosa visualized to D3      Recommendations:  Given the patient's shortness of breath and early satiety with changes of her esophagus, I would recommend surgery for her large hiatal hernia.  This will likely improve her respiratory status and her early satiety.  P.o. twice daily PPI  Follow-up in GI clinic      Scooter Bailey MD              Condition on Discharge:  Improved, stable    Vital Signs       Physical Exam:     General Appearance:    Alert, cooperative, in no acute distress   Head:    Normocephalic, without obvious abnormality, atraumatic   Eyes:            Lids and lashes normal, conjunctivae and sclerae normal, no   icterus, no pallor, corneas clear, PERRLA   Ears:    Ears appear intact with no abnormalities noted   Throat:   No oral lesions, no thrush, oral mucosa moist   Neck:   No adenopathy, supple, trachea midline, no thyromegaly, no   carotid bruit, no JVD   Lungs:     Clear to auscultation,respirations regular, even and                  unlabored    Heart:    Regular rhythm and normal rate, normal S1 and S2, no            murmur, no gallop, no rub, no click   Chest Wall:    No abnormalities observed   Abdomen:     Normal bowel sounds, no masses, no organomegaly, soft        non-tender, non-distended, no guarding, no rebound                tenderness   Extremities:   Moves all extremities well, no edema, no cyanosis, no             redness   Pulses:   Pulses palpable and equal bilaterally   Skin:   No bleeding, bruising or rash   Lymph nodes:   No palpable adenopathy   Neurologic:   Cranial nerves 2 - 12 grossly intact, sensation intact, DTR       present and equal bilaterally       Discharge Disposition  Home or Self Care    Discharge Medications      Discharge Medications        Continue These Medications        Instructions Start Date   Acetaminophen 500 MG capsule   500 mg, Oral, Every 6 Hours PRN      albuterol sulfate  (90 Base) MCG/ACT inhaler  Commonly known as: PROVENTIL HFA;VENTOLIN HFA;PROAIR HFA   2 puffs, Inhalation, Every 4 Hours PRN      azelastine 0.05 % ophthalmic solution  Commonly known as: OPTIVAR   1 drop, Right Eye, Every Night at Bedtime      B-12 1000 MCG capsule   1 capsule, Oral, Daily      brimonidine 0.2 % ophthalmic solution  Commonly known as: ALPHAGAN   Administer 1 drop to the right eye 3 (Three) Times a Day.      budesonide 0.5 MG/2ML nebulizer solution  Commonly known as: PULMICORT   0.5 mg, Nebulization, 2 Times Daily PRN      Cranberry 450 MG tablet   1 tablet, Oral, Daily      escitalopram 20 MG tablet  Commonly known as: LEXAPRO   20 mg, Oral, Daily      formoterol 20 MCG/2ML nebulizer solution  Commonly known as: PERFOROMIST   20 mcg, Nebulization, 2 Times Daily PRN      losartan 50 MG tablet  Commonly known as: COZAAR   50 mg, Oral, Daily      O2  Commonly known as: OXYGEN   3 L/min, Inhalation, Continuous      omeprazole 40 MG capsule  Commonly known as: priLOSEC   40 mg, Oral, Daily      ondansetron ODT 4 MG disintegrating tablet  Commonly known as: ZOFRAN-ODT   4 mg, Translingual, Every 8 Hours PRN      Synthroid 75 MCG tablet  Generic drug: levothyroxine   75 mcg, Oral, Daily               Discharge Diet: Gradually advance diet    Activity at Discharge: Gradually resume normal activities    Follow-up Appointments  Future Appointments   Date Time Provider Department Center   11/6/2024  1:40 PM Brian Malone MD [facility]   3/27/2025 11:15 AM Raúl Nolan MD [facility] None     Additional Instructions for the Follow-ups that You Need to Schedule       Discharge Follow-up with PCP   As directed       Currently Documented PCP:    Guillermo Wynn MD    PCP Phone Number:    [phone]     Follow  Up Details: Keep next regularly scheduled appointment.  Call office for any questions or problems.                Test Results Pending at Discharge       Shiela Mcgovern MD  10/06/24  19:51 EDT    Time: Discharge 25 min

## 2024-11-06 ENCOUNTER — OFFICE (AMBULATORY)
Age: 84
End: 2024-11-06

## 2024-11-06 ENCOUNTER — OFFICE (AMBULATORY)
Dept: URBAN - METROPOLITAN AREA CLINIC 64 | Facility: CLINIC | Age: 84
End: 2024-11-06

## 2024-11-06 VITALS
HEART RATE: 69 BPM | WEIGHT: 106.4 LBS | SYSTOLIC BLOOD PRESSURE: 100 MMHG | SYSTOLIC BLOOD PRESSURE: 100 MMHG | SYSTOLIC BLOOD PRESSURE: 100 MMHG | SYSTOLIC BLOOD PRESSURE: 100 MMHG | HEIGHT: 59 IN | DIASTOLIC BLOOD PRESSURE: 71 MMHG | HEART RATE: 69 BPM | HEIGHT: 59 IN | DIASTOLIC BLOOD PRESSURE: 71 MMHG | WEIGHT: 106.4 LBS | HEIGHT: 59 IN | HEART RATE: 69 BPM | HEART RATE: 69 BPM | HEIGHT: 59 IN | HEART RATE: 69 BPM | DIASTOLIC BLOOD PRESSURE: 71 MMHG | DIASTOLIC BLOOD PRESSURE: 71 MMHG | SYSTOLIC BLOOD PRESSURE: 100 MMHG | DIASTOLIC BLOOD PRESSURE: 71 MMHG | WEIGHT: 106.4 LBS | WEIGHT: 106.4 LBS | HEIGHT: 59 IN | SYSTOLIC BLOOD PRESSURE: 100 MMHG | HEART RATE: 69 BPM | HEIGHT: 59 IN | HEIGHT: 59 IN | WEIGHT: 106.4 LBS | SYSTOLIC BLOOD PRESSURE: 100 MMHG | HEART RATE: 69 BPM | DIASTOLIC BLOOD PRESSURE: 71 MMHG | WEIGHT: 106.4 LBS | WEIGHT: 106.4 LBS | DIASTOLIC BLOOD PRESSURE: 71 MMHG

## 2024-11-06 DIAGNOSIS — K21.9 GASTRO-ESOPHAGEAL REFLUX DISEASE WITHOUT ESOPHAGITIS: ICD-10-CM

## 2024-11-06 DIAGNOSIS — K59.00 CONSTIPATION, UNSPECIFIED: ICD-10-CM

## 2024-11-06 DIAGNOSIS — K44.9 DIAPHRAGMATIC HERNIA WITHOUT OBSTRUCTION OR GANGRENE: ICD-10-CM

## 2024-11-06 PROCEDURE — 99213 OFFICE O/P EST LOW 20 MIN: CPT

## 2024-11-17 NOTE — PROGRESS NOTES
Encounter Date:11/27/2024    Last seen 5/2/2024      Patient ID: Anitra Nobles is a 84 y.o. female.    Chief Complaint:  Mitral regurgitation  Aortic stenosis  History of shortness of breath  Hypothyroidism  Large hiatal hernia     History of Present Illness  Patient is participating in pulmonary rehabilitation at [facility].    Since I have last seen, the patient has been without any chest discomfort , unusual shortness of breath, palpitations, dizziness or syncope.  Denies having any headache ,abdominal pain ,nausea, vomiting , diarrhea constipation, loss of weight or loss of appetite.  Denies having any excessive bruising ,hematuria or blood in the stool.    Review of all systems negative except as indicated.    Reviewed ROS.    Assessment and Plan         /////////////////////////////  History  ====================   -Shortness of breath and chest discomfort     - history of 2 to 3+ mitral regurgitation.     - Moderate aortic valve stenosis with gradient of 22/12 mmHg and valve area of 1.6 cm².  However at cardiac cath no gradient was noted across the aortic valve.     - Large hiatal hernia (see recent endoscopy)     Cardiac cath 6/30/2023  Pulmonary hypertension (pulmonary artery pressure 56/22 and mean pulmonary artery pressure 34.  Left ventricular size and contractility is normal with ejection fraction of 60%.  Significant mitral regurgitation is present.  No gradient was noted across the aortic valve.  Normal epicardial coronary arteries.     Lexiscan Cardiolite test-normal-5/1/2023     Echocardiogram 5/1/2023 revealed  Left atrial enlargement  Mitral annular calcification  Moderate aortic valve stenosis with gradient of 22/12 mmHg and valve area of 1.6 cm².  Normal left ventricular function.     Attempted QMAAR 6/30/2023-not successful due to large hiatal hernia and significant tortuosity of the esophagus.     Upper endoscopy results as below.  Dr. Scooter Bailey.-6/30/2023  Impression:  1.   Esophagus was full of liquid and some semisolid food and was massively dilated with severe ulcerations and signs of stasis throughout the entire esophagus.  At 25 cm from the incisors, there is a very sharp turn at 90 degrees followed by another 90 degree turn indicating a very tortuous esophagus.  There is no stricture in the esophagus was widely patent into the stomach at 30 cm.  2.  Very large hiatal hernia with half of the stomach and the patient's chest.  There was food sitting in the hiatal hernia.  It was a very tight narrowing entering the distal stomach through the diaphragmatic hiatus which is likely why the hiatal hernia is pocketed with food  3.  Some larger gastric polyps with the appearance of hyperplastic polyps in the fundus were present and some in the hiatal hernia itself  4.  Normal duodenal mucosa visualized to D3     Recommendations:  Given the patient's shortness of breath and early satiety with changes of her esophagus, I would recommend surgery for her large hiatal hernia.  This will likely improve her respiratory status and her early satiety.  P.o. twice daily PPI  Follow-up in GI clinic     Seen by Dr. Huerta.  Did not think patient would be a surgical candidate for hiatal hernia.     -anemia     -anxiety depression     -History of esophageal stricture.     - status post partial thyroidectomy ganglionectomy and appendectomy     -former smoker     -hypothyroidism     -family history of coronary artery disease     - history of Raynaud's phenomena  ====================   Plan  ================  Shortness of breath-doing better.  Patient was seen by pulmonologist.    Patient is undergoing pulmonary rehabilitation at [facility].     Mitral regurgitation-     Moderate aortic valve stenosis.  No gradient was noted across the aortic valve at cardiac catheterization.     Pulmonary hypertension probably secondary to mitral regurgitation.      Patient had attempted QAMAR prior to  cardiac catheterization however patient has significant problems with dysphagia and GI performed upper endoscopy that revealed severe tortuosity of the esophagus achalasia and severe hiatal hernia with half of the stomach in the chest cavity.  QAMAR was not performed.     Patient's symptoms could be from large hiatal hernia in combination with mitral regurgitation which appears to be significant on left ventricular angiogram although mild on transthoracic echocardiogram.  Performing QAMAR would be extremely difficult and risky due to upper endoscopy findings.     Patient has significant pulmonary hypertension likely from mitral regurgitation.     GI follow-up with Dr. Scooter Bailey or Dr. Huerta regarding large hiatal hernia and to discuss surgical options.  Apparently Dr. Huerta did not think patient would be a surgical candidate.     Hypothyroidism-continue levothyroxine     Medications were reviewed and updated.     Patient shortness of breath is multifactorial including pulmonary hypertension mitral regurgitation hiatal hernia significant kyphoscoliosis etc.      Follow-up in the office in 6 months.     Further plan will depend on patient's progress.     Reviewed and updated 11/27/2024.  //////////////////////////////////////////               Diagnosis Plan   1. Mixed hyperlipidemia        2. Pulmonary hypertension        3. Nonrheumatic aortic valve stenosis        4. Shortness of breath        5. Acquired hypothyroidism        6. Hypothyroidism (acquired)        7. Nonrheumatic mitral valve regurgitation        LAB RESULTS (LAST 7 DAYS)    CBC        BMP        CMP         BNP        TROPONIN        CoAg        Creatinine Clearance  CrCl cannot be calculated (Patient's most recent lab result is older than the maximum 30 days allowed.).    ABG        Radiology  No radiology results for the last day                The following portions of the patient's history were reviewed and updated as appropriate:  allergies, current medications, past family history, past medical history, past social history, past surgical history, and problem list.    Review of Systems   Constitutional: Negative for malaise/fatigue.   Cardiovascular:  Negative for chest pain, leg swelling, palpitations and syncope.   Respiratory:  Negative for shortness of breath.    Skin:  Negative for rash.   Gastrointestinal:  Negative for nausea and vomiting.   Neurological:  Negative for dizziness, light-headedness and numbness.   All other systems reviewed and are negative.      Current Outpatient Medications:     Acetaminophen 500 MG capsule, Take 1 capsule by mouth Every 6 (Six) Hours As Needed., Disp: , Rfl:     albuterol sulfate  (90 Base) MCG/ACT inhaler, Inhale 2 puffs Every 4 (Four) Hours As Needed., Disp: , Rfl:     azelastine (OPTIVAR) 0.05 % ophthalmic solution, Administer 1 drop to the right eye every night at bedtime., Disp: , Rfl:     brimonidine (ALPHAGAN) 0.2 % ophthalmic solution, Administer 1 drop to the right eye 3 (Three) Times a Day., Disp: , Rfl:     budesonide (PULMICORT) 0.5 MG/2ML nebulizer solution, Take 2 mL by nebulization 2 (Two) Times a Day As Needed., Disp: , Rfl:     Cranberry 450 MG tablet, Take 1 tablet by mouth Daily., Disp: , Rfl:     Cyanocobalamin (B-12) 1000 MCG capsule, Take 1 capsule by mouth Daily., Disp: , Rfl:     escitalopram (LEXAPRO) 20 MG tablet, Take 1 tablet by mouth Daily., Disp: , Rfl:     formoterol (PERFOROMIST) 20 MCG/2ML nebulizer solution, Take 2 mL by nebulization 2 (Two) Times a Day As Needed., Disp: , Rfl:     losartan (COZAAR) 50 MG tablet, Take 1 tablet by mouth Daily., Disp: , Rfl:     O2 (OXYGEN), Inhale 3 L/min Continuous., Disp: , Rfl:     omeprazole (priLOSEC) 40 MG capsule, Take 1 capsule by mouth Daily., Disp: , Rfl:     ondansetron ODT (ZOFRAN-ODT) 4 MG disintegrating tablet, Place 1 tablet on the tongue Every 8 (Eight) Hours As Needed for Vomiting or Nausea., Disp: 20 tablet,  Rfl: 0    SYNTHROID 75 MCG tablet, Take 1 tablet by mouth Daily., Disp: , Rfl:     No Known Allergies    Family History   Problem Relation Age of Onset    No Known Problems Mother     Heart disease Father     Ovarian cancer Sister     Colon polyps Sister     Colon cancer Brother     Colon polyps Brother        Past Surgical History:   Procedure Laterality Date    APPENDECTOMY N/A     Dr. Adame    BLADDER REPAIR  2019    BREAST BIOPSY Left 2012    BENIGN    CARDIAC CATHETERIZATION N/A 2023    Procedure: Right and Left Heart Cath with Coronar Angiography;  Surgeon: Brian Malone MD;  Location: [facility] CATH INVASIVE LOCATION;  Service: Cardiovascular;  Laterality: N/A;    CATARACT EXTRACTION      Dr. Shaffer    COLONOSCOPY N/A     ENDOSCOPY N/A 2023    Procedure: ESOPHAGOGASTRODUODENOSCOPY with Dilation;  Surgeon: Scooter Bailey MD;  Location: [facility] ENDOSCOPY;  Service: Gastroenterology;  Laterality: N/A;  Post- Hiatal hernia, achalasia, food in esophagus    RECTAL PROLAPSE REPAIR  2019    THYROIDECTOMY, PARTIAL  2002    UPPER GASTROINTESTINAL ENDOSCOPY N/A     Dr. Huerta       Past Medical History:   Diagnosis Date    Anemia     Anxiety     Arthritis     Depression     GERD (gastroesophageal reflux disease)     Hypertension     Pulmonary hypertension 3/19/2018    Raynauds disease     Shortness of breath 2016    Thyroid disorder        Family History   Problem Relation Age of Onset    No Known Problems Mother     Heart disease Father     Ovarian cancer Sister     Colon polyps Sister     Colon cancer Brother     Colon polyps Brother        Social History     Socioeconomic History    Marital status:    Tobacco Use    Smoking status: Former     Current packs/day: 0.00     Types: Cigarettes     Quit date:      Years since quittin.9     Passive exposure: Past    Smokeless tobacco: Never   Vaping Use    Vaping status: Never Used   Substance and Sexual Activity     "Alcohol use: Yes     Alcohol/week: 7.0 standard drinks of alcohol     Types: 7 Glasses of wine per week    Drug use: Never    Sexual activity: Defer         Procedures      Objective:       Physical Exam    /68 (BP Location: Right arm, Patient Position: Sitting, Cuff Size: Adult)   Pulse 70   Ht 149.9 cm (59\")   Wt 48.1 kg (106 lb)   BMI 21.41 kg/m²   The patient is alert, oriented and in no distress.    Vital signs as noted above.    Head and neck revealed no carotid bruits or jugular venous distension.  No thyromegaly or lymphadenopathy is present.    Lungs clear.  No wheezing.  Breath sounds are normal bilaterally.    Heart normal first and second heart sounds.  No murmur..  No pericardial rub is present.  No gallop is present.    Abdomen soft and nontender.  No organomegaly is present.    Extremities revealed good peripheral pulses without any pedal edema.    Skin warm and dry.    Musculoskeletal system is grossly normal.    CNS grossly normal.    Reviewed and updated.        "

## 2024-11-27 ENCOUNTER — OFFICE VISIT (OUTPATIENT)
Dept: CARDIOLOGY | Facility: CLINIC | Age: 84
End: 2024-11-27
Payer: MEDICARE

## 2024-11-27 VITALS
SYSTOLIC BLOOD PRESSURE: 108 MMHG | BODY MASS INDEX: 21.37 KG/M2 | HEIGHT: 59 IN | HEART RATE: 70 BPM | WEIGHT: 106 LBS | DIASTOLIC BLOOD PRESSURE: 68 MMHG

## 2024-11-27 DIAGNOSIS — E03.9 HYPOTHYROIDISM (ACQUIRED): ICD-10-CM

## 2024-11-27 DIAGNOSIS — I35.0 NONRHEUMATIC AORTIC VALVE STENOSIS: ICD-10-CM

## 2024-11-27 DIAGNOSIS — E03.9 ACQUIRED HYPOTHYROIDISM: ICD-10-CM

## 2024-11-27 DIAGNOSIS — R06.02 SHORTNESS OF BREATH: ICD-10-CM

## 2024-11-27 DIAGNOSIS — I34.0 NONRHEUMATIC MITRAL VALVE REGURGITATION: ICD-10-CM

## 2024-11-27 DIAGNOSIS — E78.2 MIXED HYPERLIPIDEMIA: Primary | ICD-10-CM

## 2024-11-27 DIAGNOSIS — I27.20 PULMONARY HYPERTENSION: ICD-10-CM

## 2024-11-27 PROCEDURE — 1160F RVW MEDS BY RX/DR IN RCRD: CPT | Performed by: INTERNAL MEDICINE

## 2024-11-27 PROCEDURE — 1159F MED LIST DOCD IN RCRD: CPT | Performed by: INTERNAL MEDICINE

## 2024-11-27 PROCEDURE — 3074F SYST BP LT 130 MM HG: CPT | Performed by: INTERNAL MEDICINE

## 2024-11-27 PROCEDURE — 3078F DIAST BP <80 MM HG: CPT | Performed by: INTERNAL MEDICINE

## 2024-11-27 PROCEDURE — 99214 OFFICE O/P EST MOD 30 MIN: CPT | Performed by: INTERNAL MEDICINE

## 2025-04-14 ENCOUNTER — APPOINTMENT (OUTPATIENT)
Dept: CT IMAGING | Facility: HOSPITAL | Age: 85
End: 2025-04-14
Payer: MEDICARE

## 2025-04-14 ENCOUNTER — APPOINTMENT (OUTPATIENT)
Dept: GENERAL RADIOLOGY | Facility: HOSPITAL | Age: 85
End: 2025-04-14
Payer: MEDICARE

## 2025-04-14 ENCOUNTER — HOSPITAL ENCOUNTER (INPATIENT)
Facility: HOSPITAL | Age: 85
LOS: 1 days | Discharge: HOME HEALTH | End: 2025-04-16
Attending: FAMILY MEDICINE | Admitting: INTERNAL MEDICINE
Payer: MEDICARE

## 2025-04-14 DIAGNOSIS — R53.1 GENERALIZED WEAKNESS: Primary | ICD-10-CM

## 2025-04-14 PROBLEM — I48.91 NEW ONSET A-FIB: Status: ACTIVE | Noted: 2025-04-14

## 2025-04-14 PROBLEM — J98.4 RESTRICTIVE LUNG DISEASE: Status: ACTIVE | Noted: 2025-04-14

## 2025-04-14 PROBLEM — R79.89 POSITIVE D DIMER: Status: ACTIVE | Noted: 2025-04-14

## 2025-04-14 PROBLEM — J96.11 CHRONIC RESPIRATORY FAILURE WITH HYPOXIA: Status: ACTIVE | Noted: 2025-04-14

## 2025-04-14 PROBLEM — F41.1 GAD (GENERALIZED ANXIETY DISORDER): Status: ACTIVE | Noted: 2025-04-14

## 2025-04-14 LAB
ALBUMIN SERPL-MCNC: 4 G/DL (ref 3.5–5.2)
ALBUMIN/GLOB SERPL: 1.9 G/DL
ALP SERPL-CCNC: 77 U/L (ref 39–117)
ALT SERPL W P-5'-P-CCNC: 13 U/L (ref 1–33)
ANION GAP SERPL CALCULATED.3IONS-SCNC: 13.5 MMOL/L (ref 5–15)
AST SERPL-CCNC: 25 U/L (ref 1–32)
BACTERIA UR QL AUTO: ABNORMAL /HPF
BASOPHILS # BLD AUTO: 0.02 10*3/MM3 (ref 0–0.2)
BASOPHILS NFR BLD AUTO: 0.3 % (ref 0–1.5)
BILIRUB SERPL-MCNC: 0.6 MG/DL (ref 0–1.2)
BILIRUB UR QL STRIP: NEGATIVE
BUN SERPL-MCNC: 23 MG/DL (ref 8–23)
BUN/CREAT SERPL: 31.9 (ref 7–25)
CALCIUM SPEC-SCNC: 10 MG/DL (ref 8.6–10.5)
CHLORIDE SERPL-SCNC: 97 MMOL/L (ref 98–107)
CLARITY UR: CLEAR
CO2 SERPL-SCNC: 23.5 MMOL/L (ref 22–29)
COLOR UR: ABNORMAL
CREAT SERPL-MCNC: 0.72 MG/DL (ref 0.57–1)
D DIMER PPP FEU-MCNC: 1.69 MCGFEU/ML (ref 0–0.83)
D-LACTATE SERPL-SCNC: 1.8 MMOL/L (ref 0.5–2)
DEPRECATED RDW RBC AUTO: 49.5 FL (ref 37–54)
EGFRCR SERPLBLD CKD-EPI 2021: 83.1 ML/MIN/1.73
EOSINOPHIL # BLD AUTO: 0.02 10*3/MM3 (ref 0–0.4)
EOSINOPHIL NFR BLD AUTO: 0.3 % (ref 0.3–6.2)
ERYTHROCYTE [DISTWIDTH] IN BLOOD BY AUTOMATED COUNT: 13.7 % (ref 12.3–15.4)
GLOBULIN UR ELPH-MCNC: 2.1 GM/DL
GLUCOSE SERPL-MCNC: 86 MG/DL (ref 65–99)
GLUCOSE UR STRIP-MCNC: NEGATIVE MG/DL
HBA1C MFR BLD: 5.28 % (ref 4.8–5.6)
HCT VFR BLD AUTO: 43.1 % (ref 34–46.6)
HGB BLD-MCNC: 13.9 G/DL (ref 12–15.9)
HGB UR QL STRIP.AUTO: NEGATIVE
HYALINE CASTS UR QL AUTO: ABNORMAL /LPF
IMM GRANULOCYTES # BLD AUTO: 0.03 10*3/MM3 (ref 0–0.05)
IMM GRANULOCYTES NFR BLD AUTO: 0.5 % (ref 0–0.5)
KETONES UR QL STRIP: ABNORMAL
LEUKOCYTE ESTERASE UR QL STRIP.AUTO: ABNORMAL
LYMPHOCYTES # BLD AUTO: 1.03 10*3/MM3 (ref 0.7–3.1)
LYMPHOCYTES NFR BLD AUTO: 16.6 % (ref 19.6–45.3)
MCH RBC QN AUTO: 31.5 PG (ref 26.6–33)
MCHC RBC AUTO-ENTMCNC: 32.3 G/DL (ref 31.5–35.7)
MCV RBC AUTO: 97.7 FL (ref 79–97)
MONOCYTES # BLD AUTO: 0.37 10*3/MM3 (ref 0.1–0.9)
MONOCYTES NFR BLD AUTO: 5.9 % (ref 5–12)
NEUTROPHILS NFR BLD AUTO: 4.75 10*3/MM3 (ref 1.7–7)
NEUTROPHILS NFR BLD AUTO: 76.4 % (ref 42.7–76)
NITRITE UR QL STRIP: NEGATIVE
NRBC BLD AUTO-RTO: 0 /100 WBC (ref 0–0.2)
NT-PROBNP SERPL-MCNC: 1031 PG/ML (ref 0–1800)
PH UR STRIP.AUTO: <=5 [PH] (ref 5–8)
PLATELET # BLD AUTO: 184 10*3/MM3 (ref 140–450)
PMV BLD AUTO: 9.8 FL (ref 6–12)
POTASSIUM SERPL-SCNC: 4 MMOL/L (ref 3.5–5.2)
PROT SERPL-MCNC: 6.1 G/DL (ref 6–8.5)
PROT UR QL STRIP: NEGATIVE
RBC # BLD AUTO: 4.41 10*6/MM3 (ref 3.77–5.28)
RBC # UR STRIP: ABNORMAL /HPF
RBC MORPH BLD: NORMAL
REF LAB TEST METHOD: ABNORMAL
SMALL PLATELETS BLD QL SMEAR: ADEQUATE
SODIUM SERPL-SCNC: 134 MMOL/L (ref 136–145)
SP GR UR STRIP: 1.02 (ref 1–1.03)
SQUAMOUS #/AREA URNS HPF: ABNORMAL /HPF
T4 FREE SERPL-MCNC: 2.11 NG/DL (ref 0.93–1.7)
TSH SERPL DL<=0.05 MIU/L-ACNC: 1.21 UIU/ML (ref 0.27–4.2)
UROBILINOGEN UR QL STRIP: ABNORMAL
WBC # UR STRIP: ABNORMAL /HPF
WBC MORPH BLD: NORMAL
WBC NRBC COR # BLD AUTO: 6.22 10*3/MM3 (ref 3.4–10.8)

## 2025-04-14 PROCEDURE — 80053 COMPREHEN METABOLIC PANEL: CPT | Performed by: INTERNAL MEDICINE

## 2025-04-14 PROCEDURE — 81001 URINALYSIS AUTO W/SCOPE: CPT | Performed by: INTERNAL MEDICINE

## 2025-04-14 PROCEDURE — 85007 BL SMEAR W/DIFF WBC COUNT: CPT | Performed by: INTERNAL MEDICINE

## 2025-04-14 PROCEDURE — 84439 ASSAY OF FREE THYROXINE: CPT | Performed by: INTERNAL MEDICINE

## 2025-04-14 PROCEDURE — G0378 HOSPITAL OBSERVATION PER HR: HCPCS

## 2025-04-14 PROCEDURE — 25510000001 IOPAMIDOL PER 1 ML: Performed by: FAMILY MEDICINE

## 2025-04-14 PROCEDURE — 83036 HEMOGLOBIN GLYCOSYLATED A1C: CPT | Performed by: INTERNAL MEDICINE

## 2025-04-14 PROCEDURE — 85379 FIBRIN DEGRADATION QUANT: CPT | Performed by: INTERNAL MEDICINE

## 2025-04-14 PROCEDURE — 93005 ELECTROCARDIOGRAM TRACING: CPT | Performed by: INTERNAL MEDICINE

## 2025-04-14 PROCEDURE — 84443 ASSAY THYROID STIM HORMONE: CPT | Performed by: INTERNAL MEDICINE

## 2025-04-14 PROCEDURE — 25810000003 SODIUM CHLORIDE 0.9 % SOLUTION: Performed by: INTERNAL MEDICINE

## 2025-04-14 PROCEDURE — 93010 ELECTROCARDIOGRAM REPORT: CPT | Performed by: STUDENT IN AN ORGANIZED HEALTH CARE EDUCATION/TRAINING PROGRAM

## 2025-04-14 PROCEDURE — 85025 COMPLETE CBC W/AUTO DIFF WBC: CPT | Performed by: INTERNAL MEDICINE

## 2025-04-14 PROCEDURE — 83880 ASSAY OF NATRIURETIC PEPTIDE: CPT | Performed by: INTERNAL MEDICINE

## 2025-04-14 PROCEDURE — 71045 X-RAY EXAM CHEST 1 VIEW: CPT

## 2025-04-14 PROCEDURE — 83605 ASSAY OF LACTIC ACID: CPT | Performed by: INTERNAL MEDICINE

## 2025-04-14 PROCEDURE — 71275 CT ANGIOGRAPHY CHEST: CPT

## 2025-04-14 RX ORDER — ACETAMINOPHEN 160 MG/5ML
650 SOLUTION ORAL EVERY 4 HOURS PRN
Status: DISCONTINUED | OUTPATIENT
Start: 2025-04-14 | End: 2025-04-16 | Stop reason: HOSPADM

## 2025-04-14 RX ORDER — BRIMONIDINE TARTRATE 2 MG/ML
1 SOLUTION/ DROPS OPHTHALMIC 3 TIMES DAILY
Status: DISCONTINUED | OUTPATIENT
Start: 2025-04-14 | End: 2025-04-16 | Stop reason: HOSPADM

## 2025-04-14 RX ORDER — BUDESONIDE 0.5 MG/2ML
0.5 INHALANT ORAL 2 TIMES DAILY PRN
Status: DISCONTINUED | OUTPATIENT
Start: 2025-04-14 | End: 2025-04-16 | Stop reason: HOSPADM

## 2025-04-14 RX ORDER — SODIUM CHLORIDE 0.9 % (FLUSH) 0.9 %
10 SYRINGE (ML) INJECTION EVERY 12 HOURS SCHEDULED
Status: DISCONTINUED | OUTPATIENT
Start: 2025-04-14 | End: 2025-04-16 | Stop reason: HOSPADM

## 2025-04-14 RX ORDER — AMOXICILLIN 250 MG
2 CAPSULE ORAL 2 TIMES DAILY PRN
Status: DISCONTINUED | OUTPATIENT
Start: 2025-04-14 | End: 2025-04-16 | Stop reason: HOSPADM

## 2025-04-14 RX ORDER — PANTOPRAZOLE SODIUM 40 MG/1
40 TABLET, DELAYED RELEASE ORAL DAILY
Status: DISCONTINUED | OUTPATIENT
Start: 2025-04-15 | End: 2025-04-16 | Stop reason: HOSPADM

## 2025-04-14 RX ORDER — SODIUM CHLORIDE 0.9 % (FLUSH) 0.9 %
10 SYRINGE (ML) INJECTION AS NEEDED
Status: DISCONTINUED | OUTPATIENT
Start: 2025-04-14 | End: 2025-04-16 | Stop reason: HOSPADM

## 2025-04-14 RX ORDER — ARFORMOTEROL TARTRATE 15 UG/2ML
15 SOLUTION RESPIRATORY (INHALATION) 2 TIMES DAILY PRN
Status: DISCONTINUED | OUTPATIENT
Start: 2025-04-14 | End: 2025-04-16 | Stop reason: HOSPADM

## 2025-04-14 RX ORDER — ACETAMINOPHEN 325 MG/1
650 TABLET ORAL EVERY 4 HOURS PRN
Status: DISCONTINUED | OUTPATIENT
Start: 2025-04-14 | End: 2025-04-16 | Stop reason: HOSPADM

## 2025-04-14 RX ORDER — KETOTIFEN FUMARATE 0.35 MG/ML
1 SOLUTION/ DROPS OPHTHALMIC NIGHTLY
Status: DISCONTINUED | OUTPATIENT
Start: 2025-04-14 | End: 2025-04-16 | Stop reason: HOSPADM

## 2025-04-14 RX ORDER — NITROGLYCERIN 0.4 MG/1
0.4 TABLET SUBLINGUAL
Status: DISCONTINUED | OUTPATIENT
Start: 2025-04-14 | End: 2025-04-16 | Stop reason: HOSPADM

## 2025-04-14 RX ORDER — BISACODYL 10 MG
10 SUPPOSITORY, RECTAL RECTAL DAILY PRN
Status: DISCONTINUED | OUTPATIENT
Start: 2025-04-14 | End: 2025-04-16 | Stop reason: HOSPADM

## 2025-04-14 RX ORDER — ONDANSETRON 2 MG/ML
4 INJECTION INTRAMUSCULAR; INTRAVENOUS EVERY 6 HOURS PRN
Status: DISCONTINUED | OUTPATIENT
Start: 2025-04-14 | End: 2025-04-16 | Stop reason: HOSPADM

## 2025-04-14 RX ORDER — SODIUM CHLORIDE 9 MG/ML
40 INJECTION, SOLUTION INTRAVENOUS AS NEEDED
Status: DISCONTINUED | OUTPATIENT
Start: 2025-04-14 | End: 2025-04-16 | Stop reason: HOSPADM

## 2025-04-14 RX ORDER — LOSARTAN POTASSIUM 50 MG/1
50 TABLET ORAL DAILY
Status: DISCONTINUED | OUTPATIENT
Start: 2025-04-15 | End: 2025-04-16 | Stop reason: HOSPADM

## 2025-04-14 RX ORDER — MULTIPLE VITAMINS W/ MINERALS TAB 9MG-400MCG
1 TAB ORAL DAILY
COMMUNITY
End: 2025-04-16 | Stop reason: HOSPADM

## 2025-04-14 RX ORDER — IOPAMIDOL 755 MG/ML
100 INJECTION, SOLUTION INTRAVASCULAR
Status: COMPLETED | OUTPATIENT
Start: 2025-04-15 | End: 2025-04-14

## 2025-04-14 RX ORDER — LEVOTHYROXINE SODIUM 75 UG/1
75 TABLET ORAL
Status: DISCONTINUED | OUTPATIENT
Start: 2025-04-15 | End: 2025-04-14

## 2025-04-14 RX ORDER — ESCITALOPRAM OXALATE 10 MG/1
20 TABLET ORAL DAILY
Status: DISCONTINUED | OUTPATIENT
Start: 2025-04-15 | End: 2025-04-16 | Stop reason: HOSPADM

## 2025-04-14 RX ORDER — ACETAMINOPHEN 650 MG/1
650 SUPPOSITORY RECTAL EVERY 4 HOURS PRN
Status: DISCONTINUED | OUTPATIENT
Start: 2025-04-14 | End: 2025-04-16 | Stop reason: HOSPADM

## 2025-04-14 RX ORDER — POLYETHYLENE GLYCOL 3350 17 G/17G
17 POWDER, FOR SOLUTION ORAL DAILY PRN
Status: DISCONTINUED | OUTPATIENT
Start: 2025-04-14 | End: 2025-04-16 | Stop reason: HOSPADM

## 2025-04-14 RX ORDER — BISACODYL 5 MG/1
5 TABLET, DELAYED RELEASE ORAL DAILY PRN
Status: DISCONTINUED | OUTPATIENT
Start: 2025-04-14 | End: 2025-04-16 | Stop reason: HOSPADM

## 2025-04-14 RX ADMIN — IOPAMIDOL 100 ML: 755 INJECTION, SOLUTION INTRAVENOUS at 23:56

## 2025-04-14 RX ADMIN — BRIMONIDINE TARTRATE 1 DROP: 2 SOLUTION/ DROPS OPHTHALMIC at 21:13

## 2025-04-14 RX ADMIN — ACETAMINOPHEN 650 MG: 325 TABLET, FILM COATED ORAL at 21:13

## 2025-04-14 RX ADMIN — KETOTIFEN FUMARATE 1 DROP: 0.25 SOLUTION OPHTHALMIC at 21:13

## 2025-04-14 RX ADMIN — SODIUM CHLORIDE 500 ML: 9 INJECTION, SOLUTION INTRAVENOUS at 21:13

## 2025-04-14 RX ADMIN — Medication 10 ML: at 22:38

## 2025-04-14 NOTE — NURSING NOTE
Received from registration per W/C for direst admission.Call placed to Dr. Wynn's answering service to report patient's arrival.   Patient displays no signs of acute cardiopulmonary symptoms.   C/O feeling bad, tired, decreased energy.  See flow sheet for VS,  Noted to be in Atrial Fib. Which patient reports a history of.  Lung sounds clear in all fields.    Call received from Dr. Mcgovern with basic preliminary verbal orders received.

## 2025-04-15 ENCOUNTER — APPOINTMENT (OUTPATIENT)
Dept: CARDIOLOGY | Facility: HOSPITAL | Age: 85
End: 2025-04-15
Payer: MEDICARE

## 2025-04-15 LAB
AORTIC DIMENSIONLESS INDEX: 0.36 (DI)
AV MEAN PRESS GRAD SYS DOP V1V2: 23 MMHG
AV VMAX SYS DOP: 309 CM/SEC
BH CV ECHO MEAS - AO MAX PG: 38.2 MMHG
BH CV ECHO MEAS - AO V2 VTI: 61.4 CM
BH CV ECHO MEAS - AVA(I,D): 1.17 CM2
BH CV ECHO MEAS - EDV(CUBED): 50.7 ML
BH CV ECHO MEAS - EDV(MOD-SP4): 33.9 ML
BH CV ECHO MEAS - EF(MOD-SP4): 61.7 %
BH CV ECHO MEAS - ESV(CUBED): 15.6 ML
BH CV ECHO MEAS - ESV(MOD-SP4): 13 ML
BH CV ECHO MEAS - FS: 32.4 %
BH CV ECHO MEAS - IVS/LVPW: 1 CM
BH CV ECHO MEAS - IVSD: 1 CM
BH CV ECHO MEAS - LA DIMENSION: 4.3 CM
BH CV ECHO MEAS - LAT PEAK E' VEL: 6.7 CM/SEC
BH CV ECHO MEAS - LV DIASTOLIC VOL/BSA (35-75): 25.5 CM2
BH CV ECHO MEAS - LV MASS(C)D: 112.5 GRAMS
BH CV ECHO MEAS - LV MAX PG: 4.8 MMHG
BH CV ECHO MEAS - LV MEAN PG: 3 MMHG
BH CV ECHO MEAS - LV SYSTOLIC VOL/BSA (12-30): 9.8 CM2
BH CV ECHO MEAS - LV V1 MAX: 110 CM/SEC
BH CV ECHO MEAS - LV V1 VTI: 22.9 CM
BH CV ECHO MEAS - LVIDD: 3.7 CM
BH CV ECHO MEAS - LVIDS: 2.5 CM
BH CV ECHO MEAS - LVOT AREA: 3.1 CM2
BH CV ECHO MEAS - LVOT DIAM: 2 CM
BH CV ECHO MEAS - LVPWD: 1 CM
BH CV ECHO MEAS - MED PEAK E' VEL: 5.8 CM/SEC
BH CV ECHO MEAS - MR MAX PG: 111.5 MMHG
BH CV ECHO MEAS - MR MAX VEL: 528 CM/SEC
BH CV ECHO MEAS - MV A MAX VEL: 73.9 CM/SEC
BH CV ECHO MEAS - MV DEC SLOPE: 244 CM/SEC2
BH CV ECHO MEAS - MV DEC TIME: 0.26 SEC
BH CV ECHO MEAS - MV E MAX VEL: 82.3 CM/SEC
BH CV ECHO MEAS - MV E/A: 1.11
BH CV ECHO MEAS - MV MAX PG: 3.6 MMHG
BH CV ECHO MEAS - MV MEAN PG: 1.5 MMHG
BH CV ECHO MEAS - MV P1/2T: 113.5 MSEC
BH CV ECHO MEAS - MV V2 VTI: 33 CM
BH CV ECHO MEAS - MVA(P1/2T): 1.94 CM2
BH CV ECHO MEAS - MVA(VTI): 2.18 CM2
BH CV ECHO MEAS - PA ACC TIME: 0.06 SEC
BH CV ECHO MEAS - PA V2 MAX: 101 CM/SEC
BH CV ECHO MEAS - RAP SYSTOLE: 3 MMHG
BH CV ECHO MEAS - RV MAX PG: 1.8 MMHG
BH CV ECHO MEAS - RV V1 MAX: 67.1 CM/SEC
BH CV ECHO MEAS - RV V1 VTI: 15.2 CM
BH CV ECHO MEAS - RVDD: 4.3 CM
BH CV ECHO MEAS - RVSP: 76.6 MMHG
BH CV ECHO MEAS - SV(LVOT): 71.9 ML
BH CV ECHO MEAS - SV(MOD-SP4): 20.9 ML
BH CV ECHO MEAS - SVI(LVOT): 54 ML/M2
BH CV ECHO MEAS - SVI(MOD-SP4): 15.7 ML/M2
BH CV ECHO MEAS - TAPSE (>1.6): 1.41 CM
BH CV ECHO MEAS - TR MAX PG: 73.6 MMHG
BH CV ECHO MEAS - TR MAX VEL: 429 CM/SEC
BH CV ECHO MEASUREMENTS AVERAGE E/E' RATIO: 13.17
BH CV XLRA - TDI S': 11.1 CM/SEC
HEMOCCULT STL QL IA: POSITIVE
LEFT ATRIUM VOLUME INDEX: 52.8 ML/M2
LV EF BIPLANE MOD: 62 %
QT INTERVAL: 381 MS
QT INTERVAL: 422 MS
QTC INTERVAL: 433 MS
QTC INTERVAL: 464 MS
SINUS: 2.9 CM

## 2025-04-15 PROCEDURE — 93010 ELECTROCARDIOGRAM REPORT: CPT | Performed by: STUDENT IN AN ORGANIZED HEALTH CARE EDUCATION/TRAINING PROGRAM

## 2025-04-15 PROCEDURE — 82274 ASSAY TEST FOR BLOOD FECAL: CPT | Performed by: FAMILY MEDICINE

## 2025-04-15 PROCEDURE — 93306 TTE W/DOPPLER COMPLETE: CPT

## 2025-04-15 PROCEDURE — 25010000002 ENOXAPARIN PER 10 MG

## 2025-04-15 PROCEDURE — 93005 ELECTROCARDIOGRAM TRACING: CPT | Performed by: FAMILY MEDICINE

## 2025-04-15 PROCEDURE — 99222 1ST HOSP IP/OBS MODERATE 55: CPT | Performed by: INTERNAL MEDICINE

## 2025-04-15 PROCEDURE — 97162 PT EVAL MOD COMPLEX 30 MIN: CPT

## 2025-04-15 PROCEDURE — 93306 TTE W/DOPPLER COMPLETE: CPT | Performed by: STUDENT IN AN ORGANIZED HEALTH CARE EDUCATION/TRAINING PROGRAM

## 2025-04-15 RX ORDER — ENOXAPARIN SODIUM 100 MG/ML
30 INJECTION SUBCUTANEOUS EVERY 24 HOURS
Status: DISCONTINUED | OUTPATIENT
Start: 2025-04-16 | End: 2025-04-16 | Stop reason: HOSPADM

## 2025-04-15 RX ORDER — METOPROLOL SUCCINATE 25 MG/1
12.5 TABLET, EXTENDED RELEASE ORAL
Status: DISCONTINUED | OUTPATIENT
Start: 2025-04-15 | End: 2025-04-16 | Stop reason: HOSPADM

## 2025-04-15 RX ORDER — ENOXAPARIN SODIUM 100 MG/ML
1 INJECTION SUBCUTANEOUS EVERY 12 HOURS
Status: DISCONTINUED | OUTPATIENT
Start: 2025-04-15 | End: 2025-04-15 | Stop reason: DRUGHIGH

## 2025-04-15 RX ADMIN — BRIMONIDINE TARTRATE 1 DROP: 2 SOLUTION/ DROPS OPHTHALMIC at 08:23

## 2025-04-15 RX ADMIN — BRIMONIDINE TARTRATE 1 DROP: 2 SOLUTION/ DROPS OPHTHALMIC at 16:40

## 2025-04-15 RX ADMIN — Medication 10 ML: at 21:06

## 2025-04-15 RX ADMIN — ENOXAPARIN SODIUM 40 MG: 100 INJECTION SUBCUTANEOUS at 01:38

## 2025-04-15 RX ADMIN — ESCITALOPRAM 20 MG: 10 TABLET, FILM COATED ORAL at 08:22

## 2025-04-15 RX ADMIN — ENOXAPARIN SODIUM 40 MG: 100 INJECTION SUBCUTANEOUS at 14:29

## 2025-04-15 RX ADMIN — PANTOPRAZOLE SODIUM 40 MG: 40 TABLET, DELAYED RELEASE ORAL at 08:22

## 2025-04-15 RX ADMIN — BRIMONIDINE TARTRATE 1 DROP: 2 SOLUTION/ DROPS OPHTHALMIC at 21:03

## 2025-04-15 RX ADMIN — Medication 10 ML: at 08:27

## 2025-04-15 RX ADMIN — KETOTIFEN FUMARATE 1 DROP: 0.25 SOLUTION OPHTHALMIC at 21:03

## 2025-04-15 RX ADMIN — ACETAMINOPHEN 650 MG: 650 SOLUTION ORAL at 21:03

## 2025-04-15 RX ADMIN — METOPROLOL SUCCINATE 12.5 MG: 25 TABLET, EXTENDED RELEASE ORAL at 14:29

## 2025-04-15 NOTE — PLAN OF CARE
"Goal Outcome Evaluation:              Outcome Evaluation: Pt is an 85 y/o female presenting to [facility] on 4/14/25 with concerns of generalized weakness and feeling unwell x3-4 days, intermittent SOA; pt does report feeling like she was \"going to pass out\" - hypotension noted in chart.   PMH of restrictive lung disease, Raynauds, large non-surgical hiatal hernia, pulmonary hypertension, hypothyroidism.  At time of therapy eval, pt A&Ox4. She reports wearing 3L O2 NC continuously in PLOF. Pt reports living alone in [facility] with walk in entry. Pt reports she is (I) with household/community mobility/ambulation, including driving. Pt own no DME. Pt denies recent fall history.   ORTHOSTATIC VITALS:  SUPINE: /64 mmHg, pulse 80 bpm, SpO2 94% 3L NC.   SITTING: /62 mmHg, pulse 82 bpm.  STANDING: /73 mmHg,  STANDING BP post gait 129/69 mmHg, pulse 85 bpm.   Pt with no s/s hypotension.   Pt completes bed mobility and transfers with SBA, ambulates x80 ft without AD with CGA/CHET, progressed x80 ft with CGA/SBA with Rwx.  Due to impaired balance and endurance, PT recommends use/availability of Rwx fro home use and follow-up HHPT.    Anticipated Discharge Disposition (PT): home with home health                        "

## 2025-04-15 NOTE — THERAPY EVALUATION
Patient Name: Anitra Nobles  : 1940    MRN: [id number]                              Today's Date: 4/15/2025       Admit Date: 2025    Visit Dx: No diagnosis found.  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 Adame    BLADDER REPAIR  2019    BREAST BIOPSY Left     BENIGN    CARDIAC CATHETERIZATION N/A 2023    Procedure: Right and Left Heart Cath with Coronar Angiography;  Surgeon: Brian Malone MD;  Location: [facility] CATH INVASIVE LOCATION;  Service: Cardiovascular;  Laterality: N/A;    CATARACT EXTRACTION      Dr. Shaffer    COLONOSCOPY N/A     ENDOSCOPY N/A 2023    Procedure: ESOPHAGOGASTRODUODENOSCOPY with Dilation;  Surgeon: Scooter Bailey MD;  Location: [facility] ENDOSCOPY;  Service: Gastroenterology;  Laterality: N/A;  Post- Hiatal hernia, achalasia,  food in esophagus    RECTAL PROLAPSE REPAIR  04/2019    THYROIDECTOMY, PARTIAL  2002    UPPER GASTROINTESTINAL ENDOSCOPY N/A 2017    Dr. Huerta      General Information       [facility] Name 04/15/25 1016          Physical Therapy Time and Intention    Document Type evaluation  -     Mode of Treatment physical therapy  -[facility] Name 04/15/25 1016          General Information    Patient Profile Reviewed yes  -JV     Prior Level of Function independent:;all household mobility;community mobility;driving  -JV     Existing Precautions/Restrictions orthostatic hypotension;oxygen therapy device and L/min  -JV     Barriers to Rehab medically complex  -[facility] Name 04/15/25 1016          Living Environment    Current Living Arrangements home  -J     People in Home alone  -[facility] Name 04/15/25 1016          Home Main Entrance    Number of Stairs, Main Entrance none  -[facility] Name 04/15/25 1016          Stairs Within Home, Primary    Number of Stairs, Within Home, Primary none  -JV       Row Name 04/15/25 1016          Cognition    Orientation Status (Cognition) oriented x 4  -[facility] Name 04/15/25 1016          Safety Issues/Impairments Affecting Functional Mobility    Impairments Affecting Function (Mobility) balance;endurance/activity tolerance;strength  -               User Key  (r) = Recorded By, (t) = Taken By, (c) = Cosigned By      Initials Name Provider Type    J Juana Mcgregor Physical Therapist                   Mobility       [facility] Name 04/15/25 1016          Bed Mobility    Bed Mobility supine-sit  -     Supine-Sit [place] (Bed Mobility) standby assist;verbal cues  -JV       [facility] Name 04/15/25 1016          Transfers    Comment, (Transfers) s/s hypotension monitored throughout  -[facility] Name 04/15/25 1016          Bed-Chair Transfer    Bed-Chair [place] (Transfers) standby assist;verbal cues  -J     Comment, (Bed-Chair Transfer) no AD  -[facility] Name 04/15/25 1016           Sit-Stand Transfer    Sit-Stand [place] (Transfers) standby assist;verbal cues  -JV       Row Name 04/15/25 1016          Gait/Stairs (Locomotion)    [place] Level (Gait) contact guard;minimum assist (75% patient effort)  -JV     Distance in Feet (Gait) 80  -JV     Deviations/Abnormal Patterns (Gait) bob decreased;gait speed decreased;base of support, narrow  -JV     Bilateral Gait Deviations decreased arm swing;forward flexed posture  -JV     Comment, (Gait/Stairs) ambulates x80 ft without AD with CGA/CHET, progressed x80 ft with CGA/SBA with Rwx.  -JV               User Key  (r) = Recorded By, (t) = Taken By, (c) = Cosigned By      Initials Name Provider Type    Juana Doherty Physical Therapist                   Obj/Interventions       Row Name 04/15/25 1017          Range of Motion Comprehensive    General Range of Motion bilateral lower extremity ROM WFL  -JV       Row Name 04/15/25 1017          Strength Comprehensive (MMT)    Comment, General Manual Muscle Testing (MMT) Assessment BLE grossly 4/5  -JV       Row Name 04/15/25 1017          Balance    Balance Assessment standing static balance;standing dynamic balance  -JV     Static Standing Balance contact guard  -JV     Dynamic Standing Balance minimal assist  -JV     Position/Device Used, Standing Balance unsupported  -JV       Row Name 04/15/25 1017          Sensory Assessment (Somatosensory)    Sensory Assessment (Somatosensory) LE sensation intact  -JV               User Key  (r) = Recorded By, (t) = Taken By, (c) = Cosigned By      Initials Name Provider Type    Juana Doherty Physical Therapist                   Goals/Plan       Row Name 04/15/25 1020          Bed Mobility Goal 1 (PT)    Activity/Assistive Device (Bed Mobility Goal 1, PT) bed mobility activities, all  -JV     [place] Level/Cues Needed (Bed Mobility Goal 1, PT) independent  -JV     Time Frame (Bed Mobility Goal 1, PT) long term goal (LTG);2  "weeks  -JV       Row Name 04/15/25 1020          Transfer Goal 1 (PT)    Activity/Assistive Device (Transfer Goal 1, PT) sit-to-stand/stand-to-sit;bed-to-chair/chair-to-bed  -JV     [place] Level/Cues Needed (Transfer Goal 1, PT) independent  -JV     Time Frame (Transfer Goal 1, PT) long term goal (LTG);2 weeks  -JV       Row Name 04/15/25 1020          Gait Training Goal 1 (PT)    Activity/Assistive Device (Gait Training Goal 1, PT) gait (walking locomotion)  -JV     [place] Level (Gait Training Goal 1, PT) independent  -JV     Distance (Gait Training Goal 1, PT) 200 ft  -JV     Time Frame (Gait Training Goal 1, PT) long term goal (LTG);2 weeks  -JV       Row Name 04/15/25 1020          Therapy Assessment/Plan (PT)    Planned Therapy Interventions (PT) balance training;bed mobility training;gait training;home exercise program;strengthening;patient/family education;transfer training  -JV               User Key  (r) = Recorded By, (t) = Taken By, (c) = Cosigned By      Initials Name Provider Type    Juana Doherty Physical Therapist                   Clinical Impression       Row Name 04/15/25 1018          Pain    Additional Documentation Pain Scale: FACES Pre/Post-Treatment (Group)  -JV       Row Name 04/15/25 1018          Pain Scale: FACES Pre/Post-Treatment    Pain: FACES Scale, Pretreatment 0-->no hurt  -JV     Posttreatment Pain Rating 0-->no hurt  -JV       Row Name 04/15/25 1018          Plan of Care Review    Outcome Evaluation Pt is an 85 y/o female presenting to [facility] on 4/14/25 with concerns of generalized weakness and feeling unwell x3-4 days, intermittent SOA; pt does report feeling like she was \"going to pass out\" - hypotension noted in chart.   PMH of restrictive lung disease, Raynauds, large non-surgical hiatal hernia, pulmonary hypertension, hypothyroidism.  At time of therapy eval, pt A&Ox4. She reports wearing 3L O2 NC continuously in PLOF. Pt reports living alone in [facility] with walk " in entry. Pt reports she is (I) with household/community mobility/ambulation, including driving. Pt own no DME. Pt denies recent fall history.   ORTHOSTATIC VITALS:  SUPINE: /64 mmHg, pulse 80 bpm, SpO2 94% 3L NC.   SITTING: /62 mmHg, pulse 82 bpm.  STANDING: /73 mmHg,  STANDING BP post gait 129/69 mmHg, pulse 85 bpm.   Pt with no s/s hypotension.   Pt completes bed mobility and transfers with SBA, ambulates x80 ft without AD with CGA/CHET, progressed x80 ft with CGA/SBA with Rwx.  Due to impaired balance and endurance, PT recommends use/availability of Rwx fro home use and follow-up HHPT.  -JV       Row Name 04/15/25 1018          Therapy Assessment/Plan (PT)    Criteria for Skilled Interventions Met (PT) yes;meets criteria;skilled treatment is necessary  -JV     Therapy Frequency (PT) 3 times/wk  -JV     Predicted Duration of Therapy Intervention (PT) until d/c  -JV       Row Name 04/15/25 1018          Vital Signs    Pre Systolic BP Rehab 103  -JV     Pre Treatment Diastolic BP 64  -JV     Intra Systolic BP Rehab 122  -JV     Intra Treatment Diastolic BP 62  -JV     Post Systolic BP Rehab 123  -JV     Post Treatment Diastolic BP 73  -JV     Pretreatment Heart Rate (beats/min) 80  -JV     Intratreatment Heart Rate (beats/min) 82  -JV     Posttreatment Heart Rate (beats/min) 85  -JV     Pre Patient Position Supine  -JV     Intra Patient Position Sitting  -JV     Post Patient Position Standing  -JV       Row Name 04/15/25 1018          Positioning and Restraints    Pre-Treatment Position in bed  -JV     Post Treatment Position chair  -JV     In Chair notified nsg;sitting;call light within reach;encouraged to call for assist;exit alarm on  -JV               User Key  (r) = Recorded By, (t) = Taken By, (c) = Cosigned By      Initials Name Provider Type    Juana Doherty Physical Therapist                   Outcome Measures       Row Name 04/15/25 0800 04/14/25 0282       How much help from  "another person do you currently need...    Turning from your back to your side while in flat bed without using bedrails? 4  -GAIL 4  -JS (r)  RP (c)    Moving from lying on back to sitting on the side of a flat bed without bedrails? 4  -GAIL 4  -JS (r)  RP (c)    Moving to and from a bed to a chair (including a wheelchair)? 4  -GAIL 4  -JS (r)  RP (c)    Standing up from a chair using your arms (e.g., wheelchair, bedside chair)? 4  -GAIL 4  -JS (r)  RP (c)    Climbing 3-5 steps with a railing? 3  -GAIL 3  -JS (r)  RP (c)    To walk in hospital room? 3  -GAIL 3  -JS (r)  RP (c)    AM-PAC 6 Clicks Score (PT) 22  -GAIL 22  -JS              User Key  (r) = Recorded By, (t) = Taken By, (c) = Cosigned By      Initials Name Provider Type    Juana Mejia RN Registered Nurse    Tristen Napier RN Registered Nurse    Dorothy Moore RNA Registered Nurse                                 Physical Therapy Education       Title: PT OT SLP Therapies (Done)       Topic: Physical Therapy (Done)       Point: Mobility training (Done)       Learning Progress Summary            Patient Acceptance, E,TB, VU by  at 4/15/2025 1021                                      User Key       Initials Effective Dates Name Provider Type Discipline    ALECIA 03/30/21 -  Juana Mcgregor Physical Therapist PT                  PT Recommendation and Plan  Planned Therapy Interventions (PT): balance training, bed mobility training, gait training, home exercise program, strengthening, patient/family education, transfer training  Outcome Evaluation: Pt is an 83 y/o female presenting to [facility] on 4/14/25 with concerns of generalized weakness and feeling unwell x3-4 days, intermittent SOA; pt does report feeling like she was \"going to pass out\" - hypotension noted in chart.   PMH of restrictive lung disease, Raynauds, large non-surgical hiatal hernia, pulmonary hypertension, hypothyroidism.  At time of therapy eval, pt A&Ox4. She reports wearing 3L O2 NC " continuously in PLOF. Pt reports living alone in [facility] with walk in entry. Pt reports she is (I) with household/community mobility/ambulation, including driving. Pt own no DME. Pt denies recent fall history.   ORTHOSTATIC VITALS:  SUPINE: /64 mmHg, pulse 80 bpm, SpO2 94% 3L NC.   SITTING: /62 mmHg, pulse 82 bpm.  STANDING: /73 mmHg,  STANDING BP post gait 129/69 mmHg, pulse 85 bpm.   Pt with no s/s hypotension.   Pt completes bed mobility and transfers with SBA, ambulates x80 ft without AD with CGA/CHET, progressed x80 ft with CGA/SBA with Rwx.  Due to impaired balance and endurance, PT recommends use/availability of Rwx fro home use and follow-up HHPT.     Time Calculation:         PT Charges       Row Name 04/15/25 1022             Time Calculation    Start Time 0932  -JV      Stop Time 1004  -JV      Time Calculation (min) 32 min  -JV      PT Received On 04/15/25  -JV      PT - Next Appointment 04/17/25  -JV      PT Goal Re-Cert Due Date 04/29/25  -JV         Time Calculation- PT    Total Timed Code Minutes- PT 0 minute(s)  -JV                User Key  (r) = Recorded By, (t) = Taken By, (c) = Cosigned By      Initials Name Provider Type    Juana Doherty Physical Therapist                  Therapy Charges for Today       Code Description Service Date Service Provider Modifiers Qty    [id number] HC PT EVAL MOD COMPLEXITY 4 4/15/2025 Juana Mcgregor GP 1            PT G-Codes  AM-PAC 6 Clicks Score (PT): 22  PT Discharge Summary  Anticipated Discharge Disposition (PT): home with home health    Juana Mcgregor  4/15/2025

## 2025-04-15 NOTE — H&P
"    Patient Care Team:  Guillermo Wynn MD as PCP - General (Family Medicine)  Mirna Wynn MD (Inactive) as PCP - Family Medicine  Brian Malone MD as Consulting Physician (Cardiology)    Chief complaint weakness    Subjective     Patient is a 84 y.o. female with pmh of restrictive lung disease, Raynauds, large non-surgical hiatal hernia, pulmonary hypertension, hypothyroidism who presents with concerns of generalized weakness and feeling unwell for past 3-4 days.  She reports intermittent shortness of breath, which is not uncommon for her, and occasional sore throat along with intermittent slight chest discomfort when lying down..  No known fever, chest pain, chills, appetite changes, nausea, vomiting, diarrhea or urinary complaints, no new swelling of hands or feet.. She reports she felt very weak in the grocery store yesterday, \"Like I was going to pass out\", but no loss of consciousness.  She has oxygen at home that she is compliant with.  She lives alone.  She reports eating small frequent meals due to her hernia, but n other changes to her appetite.      She was seen in [facility] and referred to the ER after an EKG showed Afib.  Covid/Flu swabs were negative. Shortly after arrival to the hospital she was hypotensive with SBP in the 60's.  IV fluid bolus was given and she responded well with a SBP in the mid 90-low 100's.    BNP 1031.  , CL 97. Free t4 elevated at 2.11 with tsh 1.210. No leukocytosis noted and HGB 13.9.  UA with trace ketones and leukocytes and small rbc, no nitrite or WBC, or bacteria.  CXR was negative for acute disease.  Noted her large hiatal hernia.  EKG confirmed Afib 78.. Lactic 1.8. Dimer 1.69    Review of Systems   Constitutional:  Negative for chills and fever.   HENT:  Negative for congestion.    Respiratory:  Positive for shortness of breath. Negative for wheezing.    Cardiovascular:  Negative for chest pain, palpitations and leg swelling.   Gastrointestinal:  " Negative for abdominal pain, diarrhea, nausea and vomiting.   Genitourinary:  Negative for difficulty urinating and dysuria.   Skin:  Negative for rash.   Neurological:  Positive for weakness and light-headedness. Negative for headaches.   Psychiatric/Behavioral:  Negative for confusion.           History  Past Medical History:   Diagnosis Date    Anemia     Anxiety     Arthritis     Depression     GERD (gastroesophageal reflux disease)     Hypertension     Pulmonary hypertension 3/19/2018    Raynauds disease     Shortness of breath 2016    Thyroid disorder      Past Surgical History:   Procedure Laterality Date    APPENDECTOMY N/A     Dr. Adame    BLADDER REPAIR  2019    BREAST BIOPSY Left 2012    BENIGN    CARDIAC CATHETERIZATION N/A 2023    Procedure: Right and Left Heart Cath with Coronar Angiography;  Surgeon: Brian Malone MD;  Location: [facility] CATH INVASIVE LOCATION;  Service: Cardiovascular;  Laterality: N/A;    CATARACT EXTRACTION      Dr. Shaffer    COLONOSCOPY N/A     ENDOSCOPY N/A 2023    Procedure: ESOPHAGOGASTRODUODENOSCOPY with Dilation;  Surgeon: Scooter Bailey MD;  Location: [facility] ENDOSCOPY;  Service: Gastroenterology;  Laterality: N/A;  Post- Hiatal hernia, achalasia, food in esophagus    RECTAL PROLAPSE REPAIR  2019    THYROIDECTOMY, PARTIAL  2002    UPPER GASTROINTESTINAL ENDOSCOPY N/A     Dr. Huerta     Family History   Problem Relation Age of Onset    No Known Problems Mother     Heart disease Father     Ovarian cancer Sister     Colon polyps Sister     Colon cancer Brother     Colon polyps Brother      Social History     Tobacco Use    Smoking status: Former     Current packs/day: 0.00     Types: Cigarettes     Quit date:      Years since quittin.3     Passive exposure: Past    Smokeless tobacco: Never   Vaping Use    Vaping status: Never Used   Substance Use Topics    Alcohol use: Yes     Alcohol/week: 7.0 standard drinks of alcohol     Types:  7 Glasses of wine per week    Drug use: Never     Medications Prior to Admission   Medication Sig Dispense Refill Last Dose/Taking    Acetaminophen 500 MG capsule Take 1 capsule by mouth Every 6 (Six) Hours As Needed.   4/14/2025 Morning    bimatoprost (LUMIGAN) 0.01 % ophthalmic drops Administer 1 drop to both eyes Every Night.   4/13/2025    brimonidine (ALPHAGAN) 0.2 % ophthalmic solution Administer 1 drop to the right eye 3 (Three) Times a Day.   4/14/2025 Noon    Cranberry 450 MG tablet Take 1 tablet by mouth Daily.   4/13/2025    Cyanocobalamin (B-12) 1000 MCG capsule Take 1 capsule by mouth Daily.   4/13/2025    escitalopram (LEXAPRO) 20 MG tablet Take 1 tablet by mouth Daily.   4/13/2025    losartan (COZAAR) 50 MG tablet Take 1 tablet by mouth Daily.   4/13/2025    multivitamin with minerals tablet tablet Take 1 tablet by mouth Daily.   4/13/2025    O2 (OXYGEN) Inhale 3 L/min Continuous.   4/14/2025    omeprazole (priLOSEC) 40 MG capsule Take 1 capsule by mouth Daily.   4/14/2025    ondansetron ODT (ZOFRAN-ODT) 4 MG disintegrating tablet Place 1 tablet on the tongue Every 8 (Eight) Hours As Needed for Vomiting or Nausea. 20 tablet 0 Past Week    revefenacin (YUPELRI) 175 MCG/3ML nebulizer solution Take 3 mL by nebulization Daily.   4/13/2025    SYNTHROID 75 MCG tablet Take 1 tablet by mouth Daily.   4/14/2025    albuterol sulfate  (90 Base) MCG/ACT inhaler Inhale 2 puffs Every 4 (Four) Hours As Needed.   More than a month    budesonide (PULMICORT) 0.5 MG/2ML nebulizer solution Take 2 mL by nebulization 2 (Two) Times a Day As Needed.   More than a month    formoterol (PERFOROMIST) 20 MCG/2ML nebulizer solution Take 2 mL by nebulization 2 (Two) Times a Day As Needed. (Patient not taking: Reported on 4/14/2025)   Not Taking     Allergies:  Patient has no known allergies.    Objective     Vital Signs  Temp:  [97.7 °F (36.5 °C)-97.8 °F (36.6 °C)] 97.8 °F (36.6 °C)  Heart Rate:  [] 82  Resp:  [23-30]  23  BP: ()/(38-93) 94/75     Physical Exam:      General Appearance:    Alert, cooperative, in no acute distress, frail appearing   Head:    Normocephalic, without obvious abnormality, atraumatic   Eyes:            Lids and lashes normal, conjunctivae and sclerae normal, no   icterus, no pallor, corneas clear, PERRLA   Ears:    Ears appear intact with no abnormalities noted   Throat:   No oral lesions, no thrush, oral mucosa moist   Neck:   No adenopathy, supple, trachea midline, no thyromegaly, no   carotid bruit, no JVD   Lungs:     Clear to auscultation, diminished bases,respirations regular, even and                  unlabored    Heart:    Irregular rhythm and normal rate, normal S1 and S2,           murmur, no gallop, no rub, no click       Abdomen:     Normal bowel sounds, no masses, no organomegaly, soft        non-tender, non-distended, no guarding, no rebound                tenderness   Extremities:   Moves all extremities well, no edema, wearing gloves and socks.  Tip of nose pale   Pulses:   Pulses palpable and equal bilaterally   Skin:   No bleeding, bruising or rash   Lymph nodes:   No palpable adenopathy   Neurologic:   Cranial nerves 2 - 12 grossly intact, sensation intact, DTR       present and equal bilaterally       Results Review:     Imaging Results (Last 24 Hours)       Procedure Component Value Units Date/Time    XR Chest 1 View [[id number]] Collected: 04/14/25 1935     Updated: 04/14/25 1938    Narrative:      XR CHEST 1 VW    Date of Exam: 4/14/2025 6:40 PM EDT    Indication: New Admission    Comparison: None available.    Findings: Large hiatal hernia again noted. No consolidation. No pneumothorax or pleural effusion. The clavicles are intact. Atheromatous disease of the aortic arch. No rib fractures.      Impression:      No acute cardiopulmonary disease.      Electronically Signed: Ethan Carvajal MD    4/14/2025 7:36 PM EDT    Workstation ID: [id number]             Lab Results (last 24  "hours)       Procedure Component Value Units Date/Time    Lactic Acid, Plasma [[id number]]  (Normal) Collected: 04/14/25 2136    Specimen: Blood from Arm, Left Updated: 04/14/25 2210     Lactate 1.8 mmol/L     Urinalysis, Microscopic Only - Urine, Clean Catch [[id number]]  (Abnormal) Collected: 04/14/25 2125    Specimen: Urine, Clean Catch Updated: 04/14/25 2200     RBC, UA 3-5 /HPF      WBC, UA 0-2 /HPF      Comment: Urine culture not indicated.        Bacteria, UA None Seen /HPF      Squamous Epithelial Cells, UA 3-6 /HPF      Hyaline Casts, UA 3-6 /LPF      Methodology Automated Microscopy    D-dimer, Quantitative [[id number]]  (Abnormal) Collected: 04/14/25 2136    Specimen: Blood from Arm, Left Updated: 04/14/25 2158     D-Dimer, Quantitative 1.69 MCGFEU/mL     Narrative:      According to the assay 's published package insert, a normal (<0.50 MCGFEU/mL) D-dimer result in conjunction with a non-high clinical probability assessment, excludes deep vein thrombosis (DVT) and pulmonary embolism (PE) with high sensitivity.    D-dimer values increase with age and this can make VTE exclusion of an older population difficult. To address this, the [employer], based on best available evidence and recent guidelines, recommends that clinicians use age-adjusted D-dimer thresholds in patients greater than 50 years of age with: a) a low probability of PE who do not meet all Pulmonary Embolism Rule Out Criteria, or b) in those with intermediate probability of PE.   The formula for an age-adjusted D-dimer cut-off is \"age/100\".  For example, a 60 year old patient would have an age-adjusted cut-off of 0.60 MCGFEU/mL and an 80 year old 0.80 MCGFEU/mL.    Urinalysis With Culture If Indicated - Urine, Clean Catch [[id number]]  (Abnormal) Collected: 04/14/25 2125    Specimen: Urine, Clean Catch Updated: 04/14/25 2157     Color, UA Orange     Comment: Any Substance that causes an abnormal urine color can " alter the accuracy of the chemical reactions.        Appearance, UA Clear     pH, UA <=5.0     Specific Gravity, UA 1.024     Glucose, UA Negative     Ketones, UA 15 mg/dL (1+)     Bilirubin, UA Negative     Blood, UA Negative     Protein, UA Negative     Leuk Esterase, UA Trace     Nitrite, UA Negative     Urobilinogen, UA 1.0 E.U./dL    Narrative:      In absence of clinical symptoms, the presence of pyuria, bacteria, and/or nitrites on the urinalysis result does not correlate with infection.    BNP [[id number]]  (Normal) Collected: 04/14/25 1946    Specimen: Blood from Arm, Left Updated: 04/14/25 2107     proBNP 1,031.0 pg/mL     Narrative:      This assay is used as an aid in the diagnosis of individuals suspected of having heart failure. It can be used as an aid in the diagnosis of acute decompensated heart failure (ADHF) in patients presenting with signs and symptoms of ADHF to the emergency department (ED). In addition, NT-proBNP of <300 pg/mL indicates ADHF is not likely.    Age Range Result Interpretation  NT-proBNP Concentration (pg/mL:      <50             Positive            >450                   Gray                 300-450                    Negative             <300    50-75           Positive            >900                  Gray                300-900                  Negative            <300      >75             Positive            >1800                  Gray                300-1800                  Negative            <300    Hemoglobin A1c [[id number]]  (Normal) Collected: 04/14/25 1946    Specimen: Blood from Arm, Left Updated: 04/14/25 2041     Hemoglobin A1C 5.28 %     CBC & Differential [[id number]]  (Abnormal) Collected: 04/14/25 1946    Specimen: Blood from Arm, Left Updated: 04/14/25 2029    Narrative:      The following orders were created for panel order CBC & Differential.  Procedure                               Abnormality         Status                     ---------                                -----------         ------                     CBC Auto Differential[[id number]]        Abnormal            Final result               Scan Slide[[id number]]                                       Final result                 Please view results for these tests on the individual orders.    Scan Slide [[id number]] Collected: 04/14/25 1946    Specimen: Blood from Arm, Left Updated: 04/14/25 2029     RBC Morphology Normal     WBC Morphology Normal     Platelet Estimate Adequate    CBC Auto Differential [[id number]]  (Abnormal) Collected: 04/14/25 1946    Specimen: Blood from Arm, Left Updated: 04/14/25 2029     WBC 6.22 10*3/mm3      RBC 4.41 10*6/mm3      Hemoglobin 13.9 g/dL      Hematocrit 43.1 %      MCV 97.7 fL      MCH 31.5 pg      MCHC 32.3 g/dL      RDW 13.7 %      RDW-SD 49.5 fl      MPV 9.8 fL      Platelets 184 10*3/mm3      Neutrophil % 76.4 %      Lymphocyte % 16.6 %      Monocyte % 5.9 %      Eosinophil % 0.3 %      Basophil % 0.3 %      Immature Grans % 0.5 %      Neutrophils, Absolute 4.75 10*3/mm3      Lymphocytes, Absolute 1.03 10*3/mm3      Monocytes, Absolute 0.37 10*3/mm3      Eosinophils, Absolute 0.02 10*3/mm3      Basophils, Absolute 0.02 10*3/mm3      Immature Grans, Absolute 0.03 10*3/mm3      nRBC 0.0 /100 WBC     Comprehensive Metabolic Panel [[id number]]  (Abnormal) Collected: 04/14/25 1946    Specimen: Blood from Arm, Left Updated: 04/14/25 2023     Glucose 86 mg/dL      BUN 23 mg/dL      Creatinine 0.72 mg/dL      Sodium 134 mmol/L      Potassium 4.0 mmol/L      Comment: Slight hemolysis detected by analyzer. Result may be falsely elevated.        Chloride 97 mmol/L      CO2 23.5 mmol/L      Calcium 10.0 mg/dL      Total Protein 6.1 g/dL      Albumin 4.0 g/dL      ALT (SGPT) 13 U/L      AST (SGOT) 25 U/L      Comment: Slight hemolysis detected by analyzer. Result may be falsely elevated.        Alkaline Phosphatase 77 U/L      Total Bilirubin 0.6 mg/dL      Globulin 2.1 gm/dL      A/G  Ratio 1.9 g/dL      BUN/Creatinine Ratio 31.9     Anion Gap 13.5 mmol/L      eGFR 83.1 mL/min/1.73     Narrative:      GFR Categories in Chronic Kidney Disease (CKD)      GFR Category          GFR (mL/min/1.73)    Interpretation  G1                     90 or greater         Normal or high (1)  G2                      60-89                Mild decrease (1)  G3a                   45-59                Mild to moderate decrease  G3b                   30-44                Moderate to severe decrease  G4                    15-29                Severe decrease  G5                    14 or less           Kidney failure          (1)In the absence of evidence of kidney disease, neither GFR category G1 or G2 fulfill the criteria for CKD.    eGFR calculation 2021 CKD-EPI creatinine equation, which does not include race as a factor    TSH [[id number]]  (Normal) Collected: 04/14/25 1946    Specimen: Blood from Arm, Left Updated: 04/14/25 2022     TSH 1.210 uIU/mL     T4, Free [[id number]]  (Abnormal) Collected: 04/14/25 1946    Specimen: Blood from Arm, Left Updated: 04/14/25 2022     Free T4 2.11 ng/dL              I reviewed the patient's new clinical results.    Assessment & Plan       Generalized weakness    Aortic stenosis    Hyperlipidemia    Hypothyroidism    Mitral valve insufficiency    Pulmonary hypertension    Shortness of breath    Raynaud's disease    Hiatal hernia    Hypertensive disorder    Positive D dimer    New onset a-fib    Chronic respiratory failure with hypoxia    Restrictive lung disease    CLAY (generalized anxiety disorder)      Generalized weakness   -consult PT   -check orthostatic vitals    New onset A-fib   -cardiology consult    D.Dimer elevated   -CT Pe protocol ordered. Awaiting results to see if need treatment or preventative dosing.      Hypothyroidism   -Levothyroxine on hold as  free T4 is elevated at 2.11    Aortic stenosis, Mitral regurg,    -echo    Lung disease, pulmonary hypertension   -O2  at baseline of 2-3L   -brovanshraddha pulmicort prn as per her regimen at home.    Hiatal hernia   -continue small frequent meals   -not a surgical candidate due to cardio and pulmonary status.      Hypertension   -home losartan.  Hold if SBP less than 100 or SBP less than 60.    Anxiety  -Continue lexapro     Weight loss   -noted approximately 13 pounds since last admission in November 2024.   -Difficult to maintain oral intake due early fullness caused by hiatal hernia and increased caloric demands of chronic respiratory failure.  -nutrition consult.      GI: Protonix  VTE: SCD ordered.    CODE STATUS:  Code status (Patient has no pulse and is not breathing):  CPR (Attempt to Resuscitate)  Medical Interventions (Patient has pulse or is breathing):  Full Support  Level of Support Discussed with:  Patient    Admission Status:  I believe this patient meetsobservation status    Expected length of stay:  2 midnights or greater    I discussed the patient's findings and my recommendations with patient.     Brit Cabrera, APRN  04/14/25  23:05 EDT

## 2025-04-15 NOTE — PLAN OF CARE
Goal Outcome Evaluation:  Plan of Care Reviewed With: patient        Progress: no change     Pt resting comfortably, VSS. 3L NC. She was hypotensive at the beginning of the shift, NS bolus given. D-dimer elevated, but CT was negative.

## 2025-04-15 NOTE — PROGRESS NOTES
LOS: 0 days   Patient Care Team:  Guillermo Wynn MD as PCP - General (Family Medicine)  Mirna yWnn MD (Inactive) as PCP - Family Medicine  Brian Malone MD as Consulting Physician (Cardiology)    Subjective:  Hematochezia//No distress    Objective:   afebrile      Review of Systems:   Review of Systems   Constitutional:  Positive for activity change.   Neurological:  Positive for weakness.           Vital Signs  Temp:  [97.4 °F (36.3 °C)-98.6 °F (37 °C)] 98.3 °F (36.8 °C)  Heart Rate:  [] 74  Resp:  [18-30] 18  BP: ()/(38-93) 139/81    Physical Exam:  Physical Exam  Vitals and nursing note reviewed.   Cardiovascular:      Rate and Rhythm: Rhythm irregular.      Heart sounds: Normal heart sounds.   Pulmonary:      Breath sounds: Normal breath sounds.   Skin:     General: Skin is warm.          Radiology:  CT Angiogram Chest Pulmonary Embolism  Result Date: 4/14/2025  Impression: 1.No evidence of pulmonary embolism. No acute cardiopulmonary process. 2.Stable cardiomegaly. 3.Stable large hiatal hernia. 4.Ancillary findings as described above. Electronically Signed: Laura Sales MD  4/14/2025 11:59 PM EDT  Workstation ID: [id number]    XR Chest 1 View  Result Date: 4/14/2025  No acute cardiopulmonary disease. Electronically Signed: Ethan Carvajal MD  4/14/2025 7:36 PM EDT  Workstation ID: [id number]         Results Review:     I reviewed the patient's new clinical results.  I reviewed the patient's new imaging results and agree with the interpretation.    Medication Review:   Scheduled Meds:brimonidine, 1 drop, Right Eye, TID  [START ON 4/16/2025] enoxaparin sodium, 30 mg, Subcutaneous, Q24H  escitalopram, 20 mg, Oral, Daily  Ketotifen Fumarate, 1 drop, Right Eye, Nightly  [Held by provider] losartan, 50 mg, Oral, Daily  metoprolol succinate XL, 12.5 mg, Oral, Q24H  pantoprazole, 40 mg, Oral, Daily  sodium chloride, 10 mL, Intravenous, Q12H      Continuous Infusions:Pharmacy to Dose enoxaparin  "(LOVENOX),       PRN Meds:.  acetaminophen **OR** acetaminophen **OR** acetaminophen    arformoterol    senna-docusate sodium **AND** polyethylene glycol **AND** bisacodyl **AND** bisacodyl    budesonide    nitroglycerin    ondansetron    Pharmacy to Dose enoxaparin (LOVENOX)    sodium chloride    sodium chloride    Labs:    CBC    Results from last 7 days   Lab Units 04/14/25 1946   WBC 10*3/mm3 6.22   HEMOGLOBIN g/dL 13.9   PLATELETS 10*3/mm3 184     BMP   Results from last 7 days   Lab Units 04/14/25 1946   SODIUM mmol/L 134*   POTASSIUM mmol/L 4.0   CHLORIDE mmol/L 97*   CO2 mmol/L 23.5   BUN mg/dL 23   CREATININE mg/dL 0.72   GLUCOSE mg/dL 86     Cr Clearance Estimated Creatinine Clearance: 39.4 mL/min (by C-G formula based on SCr of 0.72 mg/dL).  Coag     HbA1C   Lab Results   Component Value Date    HGBA1C 5.28 04/14/2025     Blood Glucose No results found for: \"POCGLU\"  Infection     CMP   Results from last 7 days   Lab Units 04/14/25 1946   SODIUM mmol/L 134*   POTASSIUM mmol/L 4.0   CHLORIDE mmol/L 97*   CO2 mmol/L 23.5   BUN mg/dL 23   CREATININE mg/dL 0.72   GLUCOSE mg/dL 86   ALBUMIN g/dL 4.0   BILIRUBIN mg/dL 0.6   ALK PHOS U/L 77   AST (SGOT) U/L 25   ALT (SGPT) U/L 13     UA    Results from last 7 days   Lab Units 04/14/25 2125   NITRITE UA  Negative   WBC UA /HPF 0-2   BACTERIA UA /HPF None Seen   SQUAM EPITHEL UA /HPF 3-6*     Radiology(recent) CT Angiogram Chest Pulmonary Embolism  Result Date: 4/14/2025  Impression: 1.No evidence of pulmonary embolism. No acute cardiopulmonary process. 2.Stable cardiomegaly. 3.Stable large hiatal hernia. 4.Ancillary findings as described above. Electronically Signed: Laura Sales MD  4/14/2025 11:59 PM EDT  Workstation ID: [id number]    XR Chest 1 View  Result Date: 4/14/2025  No acute cardiopulmonary disease. Electronically Signed: Ethan Carvajal MD  4/14/2025 7:36 PM EDT  Workstation ID: [id number]     Assessment:    AFIB+RVR  Chronic hypoxic respiratory " failure  Supplemental O2 dependency  Hematochezia  Hypercoagulable state secondary to AFIB  RLD  AS  Acquired hypothyroidism  MVI  HH  GERD with esophagitis  Raynaud's disease  Dyslipidemia  CKDII  HTN with CKDII  MDE, moderate recurrent  Physical deconditioning    Plan:  Rate control//additional plans to follow        Guillermo Wynn MD  04/15/25  18:24 EDT

## 2025-04-15 NOTE — CASE MANAGEMENT/SOCIAL WORK
Discharge Planning Assessment   Taj     Patient Name: Anitra Nobles  MRN: [id number]  Today's Date: 4/15/2025    Admit Date: 4/14/2025    Plan: Anticipate routine home alone with [facility] (pending acceptance, order in place). New rolling walker through [facility] (order in place). Current home O2 3L through [facility].   Discharge Needs Assessment       Row Name 04/15/25 1239       Living Environment    People in Home alone    Current Living Arrangements home    Potentially Unsafe Housing Conditions none    In the past 12 months has the electric, gas, oil, or water company threatened to shut off services in your home? No    Primary Care Provided by self    Provides Primary Care For no one    Family Caregiver if Needed child(raheem), adult    Family Caregiver Names Daughter- Tere    Quality of Family Relationships helpful;involved;supportive    Able to Return to Prior Arrangements yes       Resource/Environmental Concerns    Resource/Environmental Concerns none    Transportation Concerns none       Transportation Needs    In the past 12 months, has lack of transportation kept you from medical appointments or from getting medications? no    In the past 12 months, has lack of transportation kept you from meetings, work, or from getting things needed for daily living? No       Food Insecurity    Within the past 12 months, you worried that your food would run out before you got the money to buy more. Never true    Within the past 12 months, the food you bought just didn't last and you didn't have money to get more. Never true       Transition Planning    Patient/Family Anticipates Transition to home with help/services    Patient/Family Anticipated Services at Transition home health care    Transportation Anticipated family or friend will provide       Discharge Needs Assessment    Readmission Within the Last 30 Days no previous admission in last 30 days    Equipment Currently Used at Home oxygen    Concerns to be Addressed  care coordination/care conferences;discharge planning    Anticipated Changes Related to Illness none    Equipment Needed After Discharge walker, rolling    Discharge Facility/Level of Care Needs home with home health    Provided Post Acute Provider List? Yes    Post Acute Provider List Home Health    Provided Post Acute Provider Quality & Resource List? Yes    Post Acute Provider Quality and Resource List Home Health    Delivered To Patient    Method of Delivery In person    Offered/Gave Vendor List yes                   Discharge Plan       Row Name 04/15/25 2772       Plan    Plan Anticipate routine home alone with [facility] (pending acceptance, order in place). New rolling walker through [facility] (order in place). Current home O2 3L through [facility].    Patient/Family in Agreement with Plan yes    Plan Comments CM met with patient and daughter at bedside to discuss dc planning and IMM letter. PCP and pharmacy confirmed, reported no trouble affording food/medications, currently enrolled in [facility] meds to bed. Patient reported that she lives alone and has a person clean for her. Reported that she has home O2 3L continuous through [facility]. Reported that she does not have a walker but needed one. Discussed therapy recommendations for HH and list provided. Patient agreeable to [facility]. CM added new referral in basket and sent to liaison Kay CAROLINA. Also added new referral in [facility] basket and sent to liaison Desi for RW need.                  Continued Care and Services - Admitted Since 4/14/2025       Durable Medical Equipment       Service Provider Request Status Services Address Phone Fax Patient Preferred    [facility] Accepted -- [address] [place] [address] [phone] --              Home Medical Care       Service Provider Request Status Services Address Phone Fax Patient Preferred    [facility] Pending - Request Sent -- [address] [place] [address] [phone]  [phone] Yes                     Demographic Summary       Row Name 04/15/25 1238       General Information    Admission Type inpatient    Arrived From emergency department    Required Notices Provided Important Message from Medicare    Referral Source admission list    Reason for Consult discharge planning    Preferred Language English       Contact Information    Permission Granted to Share Info With                    Functional Status       Row Name 04/15/25 1238       Functional Status    Usual Activity Tolerance moderate    Current Activity Tolerance moderate       Functional Status, IADL    Medications independent    Meal Preparation independent    Housekeeping assistive person    Laundry assistive person    Shopping assistive equipment and person             Beena Degroot RN     Office Phone: [phone]  Office Cell: [phone]

## 2025-04-15 NOTE — DISCHARGE PLACEMENT REQUEST
"Nahum Espinoza (84 y.o. Female)       Date of Birth   1940    Social Security Number       Address   [address] [place] [address]    Home Phone   [phone]    MRN   [id number]       [employer]   [employer]    Marital Status                               Admission Date   4/14/2025    Admission Type   Elective    Admitting Provider   Shiela Mcgovern MD    Attending Provider   Guillermo Wynn MD    Department, Room/Bed   [facility], 2113/1       Discharge Date       Discharge Disposition       Discharge Destination                                 Attending Provider: Guillermo Wynn MD    Allergies: No Known Allergies    Isolation: None   Infection: None   Code Status: CPR    Ht: 149.9 cm (59\")   Wt: 42.2 kg (93 lb 0.6 oz)    Admission Cmt: None   Principal Problem: Generalized weakness [R53.1]                   Active Insurance as of 4/14/2025       Primary Coverage       Payor Plan Insurance Group Employer/Plan Group    MEDICARE MEDICARE A & B        Payor Plan Address Payor Plan Phone Number Payor Plan Fax Number Effective Dates    [address] [phone]  9/1/2006 - None Entered    [facility] [id number]         Subscriber Name Subscriber Birth Date Member ID       NAHUM ESPINOZA 1940 [id number]               Secondary Coverage       Payor Plan Insurance Group Employer/Plan Group    [employer] [employer]        Payor Plan Address Payor Plan Phone Number Payor Plan Fax Number Effective Dates    [address] [phone]  11/1/2016 - None Entered    [facility] [id number]         Subscriber Name Subscriber Birth Date Member ID       NAHUM ESPINOZA 1940 [id number]                     Emergency Contacts        (Rel.) Home Phone Work Phone Mobile Phone    Tere Hinds (Daughter) -- -- [phone]              "

## 2025-04-15 NOTE — CONSULTS
Nutrition Services    Patient Name: Anitra Nobles  YOB: 1940  MRN: [id number]  Admission date: 4/14/2025    Comment:    Encourage good PO intake     Liberalize diet- no diabetes history and blood glucose is under control    Boost Original BID (Provides 480 kcals, 20 g protein if consumed)      Will complete NFPE at next visit    CLINICAL NUTRITION ASSESSMENT      Reason for Assessment 4/15: THANG, BMI, MST*   H&P      Past Medical History:   Diagnosis Date    Anemia     Anxiety     Arthritis     Depression     GERD (gastroesophageal reflux disease)     Hypertension     Pulmonary hypertension 3/19/2018    Raynauds disease     Shortness of breath 7/20/2016    Thyroid disorder        Past Surgical History:   Procedure Laterality Date    APPENDECTOMY N/A 1985    Dr. Adame    BLADDER REPAIR  04/2019    BREAST BIOPSY Left 2012    BENIGN    CARDIAC CATHETERIZATION N/A 6/30/2023    Procedure: Right and Left Heart Cath with Coronar Angiography;  Surgeon: Brian Malone MD;  Location: [facility] CATH INVASIVE LOCATION;  Service: Cardiovascular;  Laterality: N/A;    CATARACT EXTRACTION  2008    Dr. Shaffer    COLONOSCOPY N/A 2014    ENDOSCOPY N/A 6/30/2023    Procedure: ESOPHAGOGASTRODUODENOSCOPY with Dilation;  Surgeon: Scooter Bailey MD;  Location: [facility] ENDOSCOPY;  Service: Gastroenterology;  Laterality: N/A;  Post- Hiatal hernia, achalasia, food in esophagus    RECTAL PROLAPSE REPAIR  04/2019    THYROIDECTOMY, PARTIAL  2002    UPPER GASTROINTESTINAL ENDOSCOPY N/A 2017    Dr. Huerta        Current Problems   Generalized weakness  Aortic stenosis  Hyperlipidemia  Hypothyroidism  Mitral valve insufficiency  Pulmonary hypertension  Shortness of breath  Raynaud's disease  Hiatal hernia  Hypertensive disorder  Positive D dimer  New onset a-fib  Chronic respiratory failure with hypoxia  Restrictive lung disease  CLAY (generalized anxiety disorder)       Encounter Information        Trending Narrative     4/15: Pt  "admitted to hospital for generalized weakness. Pt has history of lung disease and new onset a-fib. Upon visit, pt was sleeping. Will revisit tomorrow for NFPE and nutrition consult.      Anthropometrics        Current Height, Weight Height: 149.9 cm (59\")  Weight: 42.2 kg (93 lb 0.6 oz) (04/15/25 0449)       Usual Body Weight (UBW) JOSE       Trending Weight Hx     This admission: 4/15: 93#             PTA: Downward trend in weight x 1 year. (15% loss)     Wt Readings from Last 30 Encounters:   04/15/25 0449 42.2 kg (93 lb 0.6 oz)   04/15/25 0350 42.2 kg (93 lb)   04/14/25 1921 42.3 kg (93 lb 4.1 oz)   11/27/24 1400 48.1 kg (106 lb)   09/26/24 2200 46.8 kg (103 lb 2.8 oz)   09/26/24 1726 46.8 kg (103 lb 2.8 oz)   08/29/24 1144 48.5 kg (107 lb)   05/02/24 1258 49.4 kg (109 lb)   03/25/24 0829 48.5 kg (107 lb)   02/22/24 1058 49.9 kg (110 lb)   11/30/23 1047 54 kg (119 lb)   10/31/23 1256 54 kg (119 lb)   07/24/23 1405 54.9 kg (121 lb)   06/30/23 0708 55.5 kg (122 lb 5.7 oz)   06/26/23 1401 54.4 kg (120 lb)   05/01/23 1419 57.2 kg (126 lb)   04/19/23 1241 57.2 kg (126 lb)   10/17/22 1358 58.3 kg (128 lb 8 oz)   04/14/22 1404 57.6 kg (127 lb)   10/13/21 1307 62.6 kg (138 lb)   10/13/21 1412 59 kg (130 lb)   07/08/21 1521 60.2 kg (132 lb 12.8 oz)   06/10/21 1420 60.8 kg (134 lb)   05/12/21 0858 60.8 kg (134 lb)   04/06/21 1327 61 kg (134 lb 8 oz)   10/17/19 1321 60.8 kg (134 lb)   04/15/19 1510 63.5 kg (140 lb)   10/15/18 1359 67.1 kg (148 lb)   04/16/18 1417 66.7 kg (147 lb)   03/19/18 1227 66.7 kg (147 lb)   02/05/18 1147 66.5 kg (146 lb 8 oz)   08/31/17 1348 62.5 kg (137 lb 12.8 oz)   08/17/17 1529 61.9 kg (136 lb 6.4 oz)      BMI kg/m2 Body mass index is 18.79 kg/m².       Labs        Pertinent Labs Reviewed. Managed per attending.    Results from last 7 days   Lab Units 04/14/25  1946   SODIUM mmol/L 134*   POTASSIUM mmol/L 4.0   CHLORIDE mmol/L 97*   CO2 mmol/L 23.5   BUN mg/dL 23   CREATININE mg/dL 0.72   CALCIUM " mg/dL 10.0   BILIRUBIN mg/dL 0.6   ALK PHOS U/L 77   ALT (SGPT) U/L 13   AST (SGOT) U/L 25   GLUCOSE mg/dL 86     Results from last 7 days   Lab Units 04/14/25 1946   HEMOGLOBIN g/dL 13.9   HEMATOCRIT % 43.1     Lab Results   Component Value Date    HGBA1C 5.28 04/14/2025        Medications    Scheduled Medications brimonidine, 1 drop, Right Eye, TID  enoxaparin sodium, 1 mg/kg, Subcutaneous, Q12H  escitalopram, 20 mg, Oral, Daily  Ketotifen Fumarate, 1 drop, Right Eye, Nightly  [Held by provider] losartan, 50 mg, Oral, Daily  metoprolol succinate XL, 12.5 mg, Oral, Q24H  pantoprazole, 40 mg, Oral, Daily  sodium chloride, 10 mL, Intravenous, Q12H        Infusions Pharmacy to Dose enoxaparin (LOVENOX),         PRN Medications   acetaminophen **OR** acetaminophen **OR** acetaminophen    arformoterol    senna-docusate sodium **AND** polyethylene glycol **AND** bisacodyl **AND** bisacodyl    budesonide    nitroglycerin    ondansetron    Pharmacy to Dose enoxaparin (LOVENOX)    sodium chloride    sodium chloride     Physical Findings        Trending Physical   Appearance, NFPE Unable to complete NFPE. Will revisit tomorrow 4/16.    --  Edema  None documented     Bowel Function No BM documented this admission.      Tubes No feeding tube in place.      Chewing/Swallowing JOSE     Skin Intact     --  Current Nutrition Orders & Evaluation of Intake       Oral Nutrition     Food Allergies NKFA   Current PO Diet Diet: Diabetic; Consistent Carbohydrate; Fluid Consistency: Thin (IDDSI 0)   Supplement None    PO Evaluation     Trending % PO Intake 4/15: 58% intake (avg x 3 documented meals)    --  Nutritional Risk Screening        NRS-2002 Score          Nutrition Diagnosis         Nutrition Dx Problem 1 Unintended weight loss R/T poor oral intake and hypermetabolism in the setting of chronic respiratory failure as evidenced by 15% weight loss in the last year.       Nutrition Dx Problem 2        Intervention Goal          Intervention Goal(s) Tolerate PO diet   Gain/maintain stable weight      Nutrition Intervention        RD Action Encourage good PO intake   Liberalize diet   Will complete NFPE at next visit.      Nutrition Prescription          Diet Prescription Regular diet     Supplement Prescription Boost Original BID (Provides 480 kcals, 20 g protein if consumed)    --  Monitor/Evaluation        Monitor Per protocol, PO intake, Pertinent labs, Weight         Electronically signed by:  Samantha Reyes  04/15/25 14:07 EDT

## 2025-04-15 NOTE — CONSULTS
Referring Provider: Guillermo Wynn MD  Reason for Consultation:  Atrial fibrillation    Patient Care Team:  Guillermo Wynn MD as PCP - General (Family Medicine)  Mirna Wynn MD (Inactive) as PCP - Family Medicine  Brian Malone MD as Consulting Physician (Cardiology)    Chief complaint  Atrial fibrillation    Subjective .     History of present illness:  Anitra Nobles is a 84 y.o. female who presents with history of multiple cardiac and noncardiac problems was admitted to the hospital with history of generalized weakness and feeling of unwell.  Denies having any chest discomfort or heaviness or tightness in the chest.  Patient came to the ER.  She also had symptoms like was going to pass out.  Did not have any syncope.  In the ER patient was found to be in atrial fibrillation and subsequently has converted to sinus rhythm.  No other associated aggravating or alleviating factors.  Cardiology consultation was requested..         ROS      Today, the patient has been without any chest discomfort , unusual shortness of breath, palpitations, dizziness or syncope.  Denies having any headache ,abdominal pain ,nausea, vomiting , diarrhea constipation, loss of weight or loss of appetite.  Denies having any excessive bruising ,hematuria or blood in the stool.    Review of all systems negative except as indicated.    Reviewed ROS.    History  Past Medical History:   Diagnosis Date    Anemia     Anxiety     Arthritis     Depression     GERD (gastroesophageal reflux disease)     Hypertension     Pulmonary hypertension 3/19/2018    Raynauds disease     Shortness of breath 7/20/2016    Thyroid disorder        Past Surgical History:   Procedure Laterality Date    APPENDECTOMY N/A 1985    Dr. Adame    BLADDER REPAIR  04/2019    BREAST BIOPSY Left 2012    BENIGN    CARDIAC CATHETERIZATION N/A 6/30/2023    Procedure: Right and Left Heart Cath with Coronar Angiography;  Surgeon: Brian Malone MD;  Location: [facility]  CATH INVASIVE LOCATION;  Service: Cardiovascular;  Laterality: N/A;    CATARACT EXTRACTION      Dr. Shaffer    COLONOSCOPY N/A     ENDOSCOPY N/A 2023    Procedure: ESOPHAGOGASTRODUODENOSCOPY with Dilation;  Surgeon: Scooter Bailey MD;  Location: [facility] ENDOSCOPY;  Service: Gastroenterology;  Laterality: N/A;  Post- Hiatal hernia, achalasia, food in esophagus    RECTAL PROLAPSE REPAIR  2019    THYROIDECTOMY, PARTIAL  2002    UPPER GASTROINTESTINAL ENDOSCOPY N/A     Dr. Huerta       Family History   Problem Relation Age of Onset    No Known Problems Mother     Heart disease Father     Ovarian cancer Sister     Colon polyps Sister     Colon cancer Brother     Colon polyps Brother        Social History     Tobacco Use    Smoking status: Former     Current packs/day: 0.00     Types: Cigarettes     Quit date:      Years since quittin.3     Passive exposure: Past    Smokeless tobacco: Never   Vaping Use    Vaping status: Never Used   Substance Use Topics    Alcohol use: Yes     Alcohol/week: 7.0 standard drinks of alcohol     Types: 7 Glasses of wine per week    Drug use: Never        Medications Prior to Admission   Medication Sig Dispense Refill Last Dose/Taking    Acetaminophen 500 MG capsule Take 1 capsule by mouth Every 6 (Six) Hours As Needed.   2025 Morning    bimatoprost (LUMIGAN) 0.01 % ophthalmic drops Administer 1 drop to both eyes Every Night.   2025    brimonidine (ALPHAGAN) 0.2 % ophthalmic solution Administer 1 drop to the right eye 3 (Three) Times a Day.   2025 Noon    Cranberry 450 MG tablet Take 1 tablet by mouth Daily.   2025    Cyanocobalamin (B-12) 1000 MCG capsule Take 1 capsule by mouth Daily.   2025    escitalopram (LEXAPRO) 20 MG tablet Take 1 tablet by mouth Daily.   2025    losartan (COZAAR) 50 MG tablet Take 1 tablet by mouth Daily.   2025    multivitamin with minerals tablet tablet Take 1 tablet by mouth Daily.   2025    O2  (OXYGEN) Inhale 3 L/min Continuous.   4/14/2025    omeprazole (priLOSEC) 40 MG capsule Take 1 capsule by mouth Daily.   4/14/2025    ondansetron ODT (ZOFRAN-ODT) 4 MG disintegrating tablet Place 1 tablet on the tongue Every 8 (Eight) Hours As Needed for Vomiting or Nausea. 20 tablet 0 Past Week    revefenacin (YUPELRI) 175 MCG/3ML nebulizer solution Take 3 mL by nebulization Daily.   4/13/2025    SYNTHROID 75 MCG tablet Take 1 tablet by mouth Daily.   4/14/2025    albuterol sulfate  (90 Base) MCG/ACT inhaler Inhale 2 puffs Every 4 (Four) Hours As Needed.   More than a month    budesonide (PULMICORT) 0.5 MG/2ML nebulizer solution Take 2 mL by nebulization 2 (Two) Times a Day As Needed.   More than a month    formoterol (PERFOROMIST) 20 MCG/2ML nebulizer solution Take 2 mL by nebulization 2 (Two) Times a Day As Needed. (Patient not taking: Reported on 4/14/2025)   Not Taking         Patient has no known allergies.    Scheduled Meds:brimonidine, 1 drop, Right Eye, TID  enoxaparin sodium, 1 mg/kg, Subcutaneous, Q12H  escitalopram, 20 mg, Oral, Daily  Ketotifen Fumarate, 1 drop, Right Eye, Nightly  losartan, 50 mg, Oral, Daily  pantoprazole, 40 mg, Oral, Daily  sodium chloride, 10 mL, Intravenous, Q12H      Continuous Infusions:Pharmacy to Dose enoxaparin (LOVENOX),       PRN Meds:.  acetaminophen **OR** acetaminophen **OR** acetaminophen    arformoterol    senna-docusate sodium **AND** polyethylene glycol **AND** bisacodyl **AND** bisacodyl    budesonide    nitroglycerin    ondansetron    Pharmacy to Dose enoxaparin (LOVENOX)    sodium chloride    sodium chloride    Objective     VITAL SIGNS  Vitals:    04/14/25 2130 04/15/25 0000 04/15/25 0350 04/15/25 0449   BP: 94/75 118/63  135/75   BP Location:  Left arm  Left arm   Patient Position:  Lying  Lying   Pulse: 82 66  77   Resp:  28  22   Temp:  98 °F (36.7 °C)  98.6 °F (37 °C)   TempSrc:  Oral  Oral   SpO2:  100%  96%   Weight:   42.2 kg (93 lb) 42.2 kg (93 lb  "0.6 oz)   Height:   149.9 cm (59\")        Flowsheet Rows      Flowsheet Row First Filed Value   Admission Height 149.9 cm (59\") Documented at 04/14/2025 1921   Admission Weight 42.3 kg (93 lb 4.1 oz) Documented at 04/14/2025 1921              Intake/Output Summary (Last 24 hours) at 4/15/2025 0649  Last data filed at 4/14/2025 2132  Gross per 24 hour   Intake 240 ml   Output 100 ml   Net 140 ml        TELEMETRY: Sinus rhythm    Physical Exam:  The patient is alert, oriented and in no distress.  Vital signs as noted above.     Head and neck revealed no carotid bruits or jugular venous distension.  No thyromegaly or lymphadenopathy is present.     Lungs clear.  No wheezing.  Breath sounds are normal bilaterally.     Heart normal first and second heart sounds.  No murmur..  No pericardial rub is present.  No gallop is present.     Abdomen soft and nontender.  No organomegaly is present.     Extremities revealed good peripheral pulses without any pedal edema.     Skin warm and dry.     Musculoskeletal system is grossly normal.     CNS grossly normal.    Reviewed and updated.    Results Review:   I reviewed the patient's new clinical results.  Lab Results (last 24 hours)       Procedure Component Value Units Date/Time    Lactic Acid, Plasma [[id number]]  (Normal) Collected: 04/14/25 2136    Specimen: Blood from Arm, Left Updated: 04/14/25 2210     Lactate 1.8 mmol/L     Urinalysis, Microscopic Only - Urine, Clean Catch [[id number]]  (Abnormal) Collected: 04/14/25 2125    Specimen: Urine, Clean Catch Updated: 04/14/25 2200     RBC, UA 3-5 /HPF      WBC, UA 0-2 /HPF      Comment: Urine culture not indicated.        Bacteria, UA None Seen /HPF      Squamous Epithelial Cells, UA 3-6 /HPF      Hyaline Casts, UA 3-6 /LPF      Methodology Automated Microscopy    D-dimer, Quantitative [[id number]]  (Abnormal) Collected: 04/14/25 2136    Specimen: Blood from Arm, Left Updated: 04/14/25 2158     D-Dimer, Quantitative 1.69 MCGFEU/mL  " "   Narrative:      According to the assay 's published package insert, a normal (<0.50 MCGFEU/mL) D-dimer result in conjunction with a non-high clinical probability assessment, excludes deep vein thrombosis (DVT) and pulmonary embolism (PE) with high sensitivity.    D-dimer values increase with age and this can make VTE exclusion of an older population difficult. To address this, the [employer], based on best available evidence and recent guidelines, recommends that clinicians use age-adjusted D-dimer thresholds in patients greater than 50 years of age with: a) a low probability of PE who do not meet all Pulmonary Embolism Rule Out Criteria, or b) in those with intermediate probability of PE.   The formula for an age-adjusted D-dimer cut-off is \"age/100\".  For example, a 60 year old patient would have an age-adjusted cut-off of 0.60 MCGFEU/mL and an 80 year old 0.80 MCGFEU/mL.    Urinalysis With Culture If Indicated - Urine, Clean Catch [[id number]]  (Abnormal) Collected: 04/14/25 2125    Specimen: Urine, Clean Catch Updated: 04/14/25 2157     Color, UA Orange     Comment: Any Substance that causes an abnormal urine color can alter the accuracy of the chemical reactions.        Appearance, UA Clear     pH, UA <=5.0     Specific Gravity, UA 1.024     Glucose, UA Negative     Ketones, UA 15 mg/dL (1+)     Bilirubin, UA Negative     Blood, UA Negative     Protein, UA Negative     Leuk Esterase, UA Trace     Nitrite, UA Negative     Urobilinogen, UA 1.0 E.U./dL    Narrative:      In absence of clinical symptoms, the presence of pyuria, bacteria, and/or nitrites on the urinalysis result does not correlate with infection.    BNP [[id number]]  (Normal) Collected: 04/14/25 1946    Specimen: Blood from Arm, Left Updated: 04/14/25 2107     proBNP 1,031.0 pg/mL     Narrative:      This assay is used as an aid in the diagnosis of individuals suspected of having heart failure. It can be used as an " aid in the diagnosis of acute decompensated heart failure (ADHF) in patients presenting with signs and symptoms of ADHF to the emergency department (ED). In addition, NT-proBNP of <300 pg/mL indicates ADHF is not likely.    Age Range Result Interpretation  NT-proBNP Concentration (pg/mL:      <50             Positive            >450                   Gray                 300-450                    Negative             <300    50-75           Positive            >900                  Gray                300-900                  Negative            <300      >75             Positive            >1800                  Gray                300-1800                  Negative            <300    Hemoglobin A1c [[id number]]  (Normal) Collected: 04/14/25 1946    Specimen: Blood from Arm, Left Updated: 04/14/25 2041     Hemoglobin A1C 5.28 %     CBC & Differential [[id number]]  (Abnormal) Collected: 04/14/25 1946    Specimen: Blood from Arm, Left Updated: 04/14/25 2029    Narrative:      The following orders were created for panel order CBC & Differential.  Procedure                               Abnormality         Status                     ---------                               -----------         ------                     CBC Auto Differential[[id number]]        Abnormal            Final result               Scan Slide[[id number]]                                       Final result                 Please view results for these tests on the individual orders.    Scan Slide [[id number]] Collected: 04/14/25 1946    Specimen: Blood from Arm, Left Updated: 04/14/25 2029     RBC Morphology Normal     WBC Morphology Normal     Platelet Estimate Adequate    CBC Auto Differential [[id number]]  (Abnormal) Collected: 04/14/25 1946    Specimen: Blood from Arm, Left Updated: 04/14/25 2029     WBC 6.22 10*3/mm3      RBC 4.41 10*6/mm3      Hemoglobin 13.9 g/dL      Hematocrit 43.1 %      MCV 97.7 fL      MCH 31.5 pg      MCHC 32.3 g/dL       RDW 13.7 %      RDW-SD 49.5 fl      MPV 9.8 fL      Platelets 184 10*3/mm3      Neutrophil % 76.4 %      Lymphocyte % 16.6 %      Monocyte % 5.9 %      Eosinophil % 0.3 %      Basophil % 0.3 %      Immature Grans % 0.5 %      Neutrophils, Absolute 4.75 10*3/mm3      Lymphocytes, Absolute 1.03 10*3/mm3      Monocytes, Absolute 0.37 10*3/mm3      Eosinophils, Absolute 0.02 10*3/mm3      Basophils, Absolute 0.02 10*3/mm3      Immature Grans, Absolute 0.03 10*3/mm3      nRBC 0.0 /100 WBC     Comprehensive Metabolic Panel [[id number]]  (Abnormal) Collected: 04/14/25 1946    Specimen: Blood from Arm, Left Updated: 04/14/25 2023     Glucose 86 mg/dL      BUN 23 mg/dL      Creatinine 0.72 mg/dL      Sodium 134 mmol/L      Potassium 4.0 mmol/L      Comment: Slight hemolysis detected by analyzer. Result may be falsely elevated.        Chloride 97 mmol/L      CO2 23.5 mmol/L      Calcium 10.0 mg/dL      Total Protein 6.1 g/dL      Albumin 4.0 g/dL      ALT (SGPT) 13 U/L      AST (SGOT) 25 U/L      Comment: Slight hemolysis detected by analyzer. Result may be falsely elevated.        Alkaline Phosphatase 77 U/L      Total Bilirubin 0.6 mg/dL      Globulin 2.1 gm/dL      A/G Ratio 1.9 g/dL      BUN/Creatinine Ratio 31.9     Anion Gap 13.5 mmol/L      eGFR 83.1 mL/min/1.73     Narrative:      GFR Categories in Chronic Kidney Disease (CKD)      GFR Category          GFR (mL/min/1.73)    Interpretation  G1                     90 or greater         Normal or high (1)  G2                      60-89                Mild decrease (1)  G3a                   45-59                Mild to moderate decrease  G3b                   30-44                Moderate to severe decrease  G4                    15-29                Severe decrease  G5                    14 or less           Kidney failure          (1)In the absence of evidence of kidney disease, neither GFR category G1 or G2 fulfill the criteria for CKD.    eGFR calculation 2021 CKD-EPI  creatinine equation, which does not include race as a factor    TSH [[id number]]  (Normal) Collected: 04/14/25 1946    Specimen: Blood from Arm, Left Updated: 04/14/25 2022     TSH 1.210 uIU/mL     T4, Free [[id number]]  (Abnormal) Collected: 04/14/25 1946    Specimen: Blood from Arm, Left Updated: 04/14/25 2022     Free T4 2.11 ng/dL             Imaging Results (Last 24 Hours)       Procedure Component Value Units Date/Time    CT Angiogram Chest Pulmonary Embolism [[id number]] Collected: 04/14/25 2357     Updated: 04/15/25 0001    Narrative:      CT ANGIOGRAM CHEST PULMONARY EMBOLISM    Date of Exam: 4/14/2025 11:55 PM EDT    Indication: elevated d. dimer.  in new afib..    Comparison: 4/14/2025, 3/14/2024.    Technique: Axial CT images were obtained of the chest after the uneventful intravenous administration of iodinated contrast utilizing pulmonary embolism protocol.  In addition, a 3-D volume rendered image was created for interpretation.  Sagittal and   coronal reconstructions were performed.  Automated exposure control and iterative reconstruction methods were used.      Findings:    Pulmonary arteries: Adequate opacification of the pulmonary arteries. No evidence of acute pulmonary embolism.    Lungs and Pleura: The lungs are clear. No nodule. No consolidation. No pleural fluid. Redemonstration of a large hiatal hernia with the majority of the stomach seen above the left hemidiaphragm. The esophagus appears patulous and contains fluid, similar   as compared to the previous study.    Mediastinum/[place]: No mediastinal or hilar lymphadenopathy.    Lymph nodes: No axillary or supraclavicular adenopathy.    Cardiovascular: The heart appears enlarged, similar as compared to the previous study.. The pericardium is normal. The aorta and its arch branch vessels are unremarkable.       Upper Abdomen: The upper abdominal contents are unremarkable.          Bones and Soft Tissue: No suspicious osseous lesion.         Impression:      Impression:  1.No evidence of pulmonary embolism. No acute cardiopulmonary process.  2.Stable cardiomegaly.  3.Stable large hiatal hernia.  4.Ancillary findings as described above.            Electronically Signed: Laura Sales MD    4/14/2025 11:59 PM EDT    Workstation ID: [id number]    XR Chest 1 View [[id number]] Collected: 04/14/25 1935     Updated: 04/14/25 1938    Narrative:      XR CHEST 1 VW    Date of Exam: 4/14/2025 6:40 PM EDT    Indication: New Admission    Comparison: None available.    Findings: Large hiatal hernia again noted. No consolidation. No pneumothorax or pleural effusion. The clavicles are intact. Atheromatous disease of the aortic arch. No rib fractures.      Impression:      No acute cardiopulmonary disease.      Electronically Signed: Ethan Carvajal MD    4/14/2025 7:36 PM EDT    Workstation ID: [id number]        LAB RESULTS (LAST 7 DAYS)    CBC  Results from last 7 days   Lab Units 04/14/25  1946   WBC 10*3/mm3 6.22   RBC 10*6/mm3 4.41   HEMOGLOBIN g/dL 13.9   HEMATOCRIT % 43.1   MCV fL 97.7*   PLATELETS 10*3/mm3 184       BMP  Results from last 7 days   Lab Units 04/14/25  1946   SODIUM mmol/L 134*   POTASSIUM mmol/L 4.0   CHLORIDE mmol/L 97*   CO2 mmol/L 23.5   BUN mg/dL 23   CREATININE mg/dL 0.72   GLUCOSE mg/dL 86       CMP   Results from last 7 days   Lab Units 04/14/25  1946   SODIUM mmol/L 134*   POTASSIUM mmol/L 4.0   CHLORIDE mmol/L 97*   CO2 mmol/L 23.5   BUN mg/dL 23   CREATININE mg/dL 0.72   GLUCOSE mg/dL 86   ALBUMIN g/dL 4.0   BILIRUBIN mg/dL 0.6   ALK PHOS U/L 77   AST (SGOT) U/L 25   ALT (SGPT) U/L 13         BNP        TROPONIN        CoAg        Creatinine Clearance  Estimated Creatinine Clearance: 39.4 mL/min (by C-G formula based on SCr of 0.72 mg/dL).    ABG        Radiology  CT Angiogram Chest Pulmonary Embolism  Result Date: 4/14/2025  Impression: 1.No evidence of pulmonary embolism. No acute cardiopulmonary process. 2.Stable cardiomegaly. 3.Stable large  hiatal hernia. 4.Ancillary findings as described above. Electronically Signed: Laura Sales MD  4/14/2025 11:59 PM EDT  Workstation ID: [id number]    XR Chest 1 View  Result Date: 4/14/2025  No acute cardiopulmonary disease. Electronically Signed: Ethan Carvajal MD  4/14/2025 7:36 PM EDT  Workstation ID: [id number]        EKG      I personally viewed and interpreted the patient's EKG/Telemetry data: Sinus rhythm premature atrial contraction    ECHOCARDIOGRAM:    Results for orders placed during the hospital encounter of 06/30/23    Adult Transesophageal Echo (QAMAR) W/ Cont if Necessary Per Protocol    Interpretation Summary  Date of procedure  6/30/2023    Indications  Mitral regurgitation  Aortic valve stenosis  Shortness of breath    Procedure performed  Transesophageal echocardiogram (attempted) and Doppler study    Procedure  Anesthesia was provided by anesthesiologist with intravenous Diprivan.  An attempt was made to pass QAMAR probe but could not be advanced beyond 25 cm and patient has history of dysphagia and only gentle pressure was used but could not advance the QAMAR probe.  QAMAR was abandoned at this time and GI consultation for upper endoscopy was obtained.  Patient had upper endoscopy and revealed the following results.  Proceed with cardiac catheterization.      Impression:  1.  Esophagus was full of liquid and some semisolid food and was massively dilated with severe ulcerations and signs of stasis throughout the entire esophagus.  At 25 cm from the incisors, there is a very sharp turn at 90 degrees followed by another 90 degree turn indicating a very tortuous esophagus.  There is no stricture in the esophagus was widely patent into the stomach at 30 cm.  2.  Very large hiatal hernia with half of the stomach and the patient's chest.  There was food sitting in the hiatal hernia.  It was a very tight narrowing entering the distal stomach through the diaphragmatic hiatus which is likely why the hiatal hernia is  pocketed with food  3.  Some larger gastric polyps with the appearance of hyperplastic polyps in the fundus were present and some in the hiatal hernia itself  4.  Normal duodenal mucosa visualized to D3      Recommendations:  Given the patient's shortness of breath and early satiety with changes of her esophagus, I would recommend surgery for her large hiatal hernia.  This will likely improve her respiratory status and her early satiety.  P.o. twice daily PPI  Follow-up in GI clinic      Scooter Bailey MD      STRESS TEST  Results for orders placed during the hospital encounter of 05/01/23    Stress Test With Myocardial Perfusion One Day    Interpretation Summary  Indications  Chest pain    This study was performed under the direct supervision of Jania Layne nurse practitioner.    Resting ECG  Sinus rhythm    The patient was injected with Lexiscan intravenously while constantly monitoring electrocardiogram and vital signs.  Patient did not have any chest discomfort ST abnormalities or ectopy with injection of Lexiscan.    Cardiolite was used as an imaging agent.    Cardiolite images showed uniform distribution of radionuclide without any evidence for myocardial ischemia.    Gated SPECT images revealed normal left ventricle size and contractility with ejection fraction of 87%.    Impression  ========  Lexiscan Cardiolite test is negative for myocardial ischemia.    Gated SPECT images revealed normal left ventricular size and contractility with ejection fraction of 87%.        Cardiolite (Tc-99m sestamibi) stress test    HEART CATHETERIZATION  Results for orders placed during the hospital encounter of 06/30/23    Cardiac Catheterization/Vascular Study    Conclusion  CARDIAC CATHETERIZATION REPORT    DATE OF PROCEDURE:  6/30/2023      INDICATION FOR PROCEDURE:  Shortness of breath  Mitral regurgitation  Aortic valve stenosis      PROCEDURE PERFORMED:    Right heart catheterization, left heart catheterization,  coronary angiography, left ventriculography    @@  Moderate conscious sedation was utilized with intravenous Versed and fentanyl administered by registered nurse with continuous ECG, pulse oximetry and hemodynamic monitoring supervised by myself throughout the entire procedure.  Conscious sedation time   30 minutes    I was present with the patient for the duration of moderate sedation and supervised staff who had no other duties and monitored the patient for the entire procedure.    @@  PROCEDURE COMMENTS:  Under usual sterile precautions and 1% lidocaine infiltration right femoral vein and femoral artery were percutaneously punctured and a 7 and 5 [place] vascular sheaths were respectively inserted.  Campbell-Brandon catheter was used to measure right-sided pressures pulmonary capital wedge pressure and cardiac output using thermodilution technique.  Campbell-Brandon catheter was removed and subsequently left and right coronary arteriography was performed followed by left ventricular angiogram.  Hemostasis was obtained and patient was returned to the room with intact pulses.  No complications were noted.  Patient tolerated the procedure well.    FINDINGS:    1. HEMODYNAMICS:  Mean right atrial pressure 6 right ventricle 55/6 pulmonary artery 56/22 mean pulmonary artery 34 pulmonary capillary wedge pressure is 13.  Left ventricle end-diastolic pressure is normal.  No gradient was noted across the aortic valve.    2. LEFT VENTRICULOGRAPHY:  Left ventricular size and contractility is normal with ejection fraction of 60%.  Significant mitral regurgitation is present.    3. CORONARY ANGIOGRAPHY:  Left main coronary artery is normal.  Left anterior descending artery is normal.  Circumflex coronary artery is a codominant vessel and is normal.  Right coronary artery is a codominant vessel and is normal.    SUMMARY:  Pulmonary hypertension (pulmonary artery pressure 56/22 and mean pulmonary artery pressure 34.    Left ventricular size  and contractility is normal with ejection fraction of 60%.  Significant mitral regurgitation is present.    No gradient was noted across the aortic valve.    Normal epicardial coronary arteries.    RECOMMENDATIONS:  Patient has significant shortness of breath.    Patient had attempted QAMAR prior to cardiac catheterization however patient has significant problems with dysphagia and GI performed upper endoscopy that revealed severe tortuosity of the esophagus achalasia and severe hiatal hernia with half of the stomach in the chest cavity.  QAMAR was not performed.    Patient's symptoms could be from large hiatal hernia in combination with mitral regurgitation which appears to be significant on left ventricular angiogram although mild on transthoracic echocardiogram.  Performing QAMAR would be extremely difficult and risky due to upper endoscopy findings.    Patient has significant pulmonary hypertension likely from mitral regurgitation.    We will review the data with cardiovascular surgeon however lack of ability to perform transesophageal echocardiogram is a big concern.    We will have general surgery consultation in coordination with GI service for treatment of large hiatal hernia.  =======  Upper endoscopy results as below.  Impression:  1.  Esophagus was full of liquid and some semisolid food and was massively dilated with severe ulcerations and signs of stasis throughout the entire esophagus.  At 25 cm from the incisors, there is a very sharp turn at 90 degrees followed by another 90 degree turn indicating a very tortuous esophagus.  There is no stricture in the esophagus was widely patent into the stomach at 30 cm.  2.  Very large hiatal hernia with half of the stomach and the patient's chest.  There was food sitting in the hiatal hernia.  It was a very tight narrowing entering the distal stomach through the diaphragmatic hiatus which is likely why the hiatal hernia is pocketed with food  3.  Some larger gastric  polyps with the appearance of hyperplastic polyps in the fundus were present and some in the hiatal hernia itself  4.  Normal duodenal mucosa visualized to D3      Recommendations:  Given the patient's shortness of breath and early satiety with changes of her esophagus, I would recommend surgery for her large hiatal hernia.  This will likely improve her respiratory status and her early satiety.  P.o. twice daily PPI  Follow-up in GI clinic    Scooter Bailey MD  ]]]]]]]]]]]]]]]      OTHER:     Assessment & Plan     Principal Problem:    Generalized weakness  Active Problems:    Aortic stenosis    Hyperlipidemia    Hypothyroidism    Mitral valve insufficiency    Pulmonary hypertension    Shortness of breath    Raynaud's disease    Hiatal hernia    Hypertensive disorder    Positive D dimer    New onset a-fib    Chronic respiratory failure with hypoxia    Restrictive lung disease    CLAY (generalized anxiety disorder)      /////////////////////////////  History  ====================   -Shortness of breath and chest discomfort     - history of 2 to 3+ mitral regurgitation.     - Moderate aortic valve stenosis with gradient of 22/12 mmHg and valve area of 1.6 cm².  However at cardiac cath no gradient was noted across the aortic valve.     - Large hiatal hernia (see recent endoscopy)     Cardiac cath 6/30/2023  Pulmonary hypertension (pulmonary artery pressure 56/22 and mean pulmonary artery pressure 34.  Left ventricular size and contractility is normal with ejection fraction of 60%.  Significant mitral regurgitation is present.  No gradient was noted across the aortic valve.  Normal epicardial coronary arteries.     Lexiscan Cardiolite test-normal-5/1/2023     Echocardiogram 5/1/2023 revealed  Left atrial enlargement  Mitral annular calcification  Moderate aortic valve stenosis with gradient of 22/12 mmHg and valve area of 1.6 cm².  Normal left ventricular function.     Attempted QAMAR 6/30/2023-not successful due to large  hiatal hernia and significant tortuosity of the esophagus.     Upper endoscopy results as below.  Dr. Scooter Bailey.-6/30/2023  Impression:  1.  Esophagus was full of liquid and some semisolid food and was massively dilated with severe ulcerations and signs of stasis throughout the entire esophagus.  At 25 cm from the incisors, there is a very sharp turn at 90 degrees followed by another 90 degree turn indicating a very tortuous esophagus.  There is no stricture in the esophagus was widely patent into the stomach at 30 cm.  2.  Very large hiatal hernia with half of the stomach and the patient's chest.  There was food sitting in the hiatal hernia.  It was a very tight narrowing entering the distal stomach through the diaphragmatic hiatus which is likely why the hiatal hernia is pocketed with food  3.  Some larger gastric polyps with the appearance of hyperplastic polyps in the fundus were present and some in the hiatal hernia itself  4.  Normal duodenal mucosa visualized to D3     Recommendations:  Given the patient's shortness of breath and early satiety with changes of her esophagus, I would recommend surgery for her large hiatal hernia.  This will likely improve her respiratory status and her early satiety.  P.o. twice daily PPI  Follow-up in GI clinic     Seen by Dr. Huerta.  Did not think patient would be a surgical candidate for hiatal hernia.     -anemia     -anxiety depression     -History of esophageal stricture.     - status post partial thyroidectomy ganglionectomy and appendectomy     -former smoker     -hypothyroidism     -family history of coronary artery disease     - history of Raynaud's phenomena  ====================   Plan  ================  Patient was admitted to the hospital with generalized weakness.  Patient was thought to have atrial fibrillation although the first EKG showed sinus rhythm with premature atrial contractions.  Patient is in sinus rhythm now.  Patient's blood pressure is  somewhat borderline.  Will hold losartan.  Start low-dose beta-blocker with close observation of heart rate for bradycardia.    Shortness of breath-doing better.  In the past, patient was seen by pulmonologist.    Patient is undergoing pulmonary [facility] at [facility].     Mitral regurgitation-stable     Moderate aortic valve stenosis.  No gradient was noted across the aortic valve at cardiac catheterization.     Pulmonary hypertension probably secondary to mitral regurgitation.      Patient had attempted QAMAR prior to cardiac catheterization however patient has significant problems with dysphagia and GI performed upper endoscopy that revealed severe tortuosity of the esophagus achalasia and severe hiatal hernia with half of the stomach in the chest cavity.  QAMAR was not performed.     Patient's symptoms could be from large hiatal hernia in combination with mitral regurgitation which appears to be significant on left ventricular angiogram although mild on transthoracic echocardiogram.  Performing QAMAR would be extremely difficult and risky due to upper endoscopy findings.     Patient has significant pulmonary hypertension likely from mitral regurgitation.     GI follow-up with Dr. Scooter Bailey or Dr. Huerta regarding large hiatal hernia and to discuss surgical options.  Apparently Dr. Huerta did not think patient would be a surgical candidate.     Hypothyroidism-on levothyroxine     Medications were reviewed and updated.    History of shortness of breath-multifactorial including pulmonary hypertension mitral regurgitation hiatal hernia significant kyphoscoliosis.    Further plan will depend on patient's progress.    Reviewed and updated 4/15/2025.  //////////////////////////////////////////            Brian Malone MD  04/15/25  06:49 EDT

## 2025-04-15 NOTE — CASE MANAGEMENT/SOCIAL WORK
Continued Stay Note  [facility]     Patient Name: Anitra Nobles  MRN: [id number]  Today's Date: 4/15/2025    Admit Date: 4/14/2025    Plan: Anticipate routine home alone with [facility] (accepted, order in place). New rolling walker through [facility] (order in place). Current home O2 3L through [facility].   Discharge Plan       Row Name 04/15/25 1434       Plan    Plan Anticipate routine home alone with [facility] (accepted, order in place). New rolling walker through [employer] (order in place). Current home O2 3L through [facility].    Plan Comments CM received update from [facility] liaison Kay CAROLINA that patient has been accepted.                    Beena Degroot RN     Office Phone: [phone]  Office Cell: [phone]

## 2025-04-16 ENCOUNTER — INPATIENT HOSPITAL (AMBULATORY)
Dept: URBAN - METROPOLITAN AREA HOSPITAL 84 | Facility: HOSPITAL | Age: 85
End: 2025-04-16
Payer: MEDICARE

## 2025-04-16 ENCOUNTER — READMISSION MANAGEMENT (OUTPATIENT)
Dept: CALL CENTER | Facility: HOSPITAL | Age: 85
End: 2025-04-16
Payer: MEDICARE

## 2025-04-16 VITALS
WEIGHT: 93.03 LBS | BODY MASS INDEX: 18.76 KG/M2 | TEMPERATURE: 99.4 F | SYSTOLIC BLOOD PRESSURE: 94 MMHG | HEIGHT: 59 IN | HEART RATE: 56 BPM | RESPIRATION RATE: 18 BRPM | DIASTOLIC BLOOD PRESSURE: 72 MMHG | OXYGEN SATURATION: 100 %

## 2025-04-16 DIAGNOSIS — K62.3 RECTAL PROLAPSE: ICD-10-CM

## 2025-04-16 DIAGNOSIS — K44.9 DIAPHRAGMATIC HERNIA WITHOUT OBSTRUCTION OR GANGRENE: ICD-10-CM

## 2025-04-16 DIAGNOSIS — K92.1 MELENA: ICD-10-CM

## 2025-04-16 DIAGNOSIS — K21.9 GASTRO-ESOPHAGEAL REFLUX DISEASE WITHOUT ESOPHAGITIS: ICD-10-CM

## 2025-04-16 DIAGNOSIS — K59.00 CONSTIPATION, UNSPECIFIED: ICD-10-CM

## 2025-04-16 PROBLEM — E44.0 MODERATE PROTEIN-CALORIE MALNUTRITION: Status: ACTIVE | Noted: 2025-04-16

## 2025-04-16 PROBLEM — J98.4 RESTRICTIVE LUNG DISEASE: Status: ACTIVE | Noted: 2025-04-16

## 2025-04-16 PROBLEM — R00.2 PALPITATIONS: Status: ACTIVE | Noted: 2025-04-16

## 2025-04-16 PROBLEM — Z99.81 DEPENDENCE ON SUPPLEMENTAL OXYGEN: Status: ACTIVE | Noted: 2025-04-16

## 2025-04-16 PROBLEM — I47.19 ATRIAL TACHYCARDIA: Status: ACTIVE | Noted: 2025-04-16

## 2025-04-16 PROBLEM — I49.1 PREMATURE ATRIAL COMPLEXES: Status: ACTIVE | Noted: 2025-04-16

## 2025-04-16 LAB
ANION GAP SERPL CALCULATED.3IONS-SCNC: 7.1 MMOL/L (ref 5–15)
BUN SERPL-MCNC: 15 MG/DL (ref 8–23)
BUN/CREAT SERPL: 26.8 (ref 7–25)
CALCIUM SPEC-SCNC: 9.3 MG/DL (ref 8.6–10.5)
CHLORIDE SERPL-SCNC: 99 MMOL/L (ref 98–107)
CO2 SERPL-SCNC: 25.9 MMOL/L (ref 22–29)
CREAT SERPL-MCNC: 0.56 MG/DL (ref 0.57–1)
DEPRECATED RDW RBC AUTO: 50.4 FL (ref 37–54)
EGFRCR SERPLBLD CKD-EPI 2021: 90.7 ML/MIN/1.73
ERYTHROCYTE [DISTWIDTH] IN BLOOD BY AUTOMATED COUNT: 13.9 % (ref 12.3–15.4)
GLUCOSE SERPL-MCNC: 92 MG/DL (ref 65–99)
HCT VFR BLD AUTO: 36.7 % (ref 34–46.6)
HGB BLD-MCNC: 11.7 G/DL (ref 12–15.9)
MCH RBC QN AUTO: 31.3 PG (ref 26.6–33)
MCHC RBC AUTO-ENTMCNC: 31.9 G/DL (ref 31.5–35.7)
MCV RBC AUTO: 98.1 FL (ref 79–97)
PLATELET # BLD AUTO: 271 10*3/MM3 (ref 140–450)
PMV BLD AUTO: 8.8 FL (ref 6–12)
POTASSIUM SERPL-SCNC: 3.6 MMOL/L (ref 3.5–5.2)
RBC # BLD AUTO: 3.74 10*6/MM3 (ref 3.77–5.28)
SODIUM SERPL-SCNC: 132 MMOL/L (ref 136–145)
WBC NRBC COR # BLD AUTO: 6.19 10*3/MM3 (ref 3.4–10.8)

## 2025-04-16 PROCEDURE — 80048 BASIC METABOLIC PNL TOTAL CA: CPT | Performed by: FAMILY MEDICINE

## 2025-04-16 PROCEDURE — 99232 SBSQ HOSP IP/OBS MODERATE 35: CPT | Performed by: INTERNAL MEDICINE

## 2025-04-16 PROCEDURE — 85027 COMPLETE CBC AUTOMATED: CPT | Performed by: FAMILY MEDICINE

## 2025-04-16 PROCEDURE — 99223 1ST HOSP IP/OBS HIGH 75: CPT

## 2025-04-16 RX ORDER — METOPROLOL SUCCINATE 25 MG/1
12.5 TABLET, EXTENDED RELEASE ORAL
Qty: 15 TABLET | Refills: 3 | Status: SHIPPED | OUTPATIENT
Start: 2025-04-17

## 2025-04-16 RX ADMIN — METOPROLOL SUCCINATE 12.5 MG: 25 TABLET, EXTENDED RELEASE ORAL at 08:35

## 2025-04-16 RX ADMIN — Medication 10 ML: at 08:38

## 2025-04-16 RX ADMIN — ESCITALOPRAM 20 MG: 10 TABLET, FILM COATED ORAL at 08:35

## 2025-04-16 RX ADMIN — BRIMONIDINE TARTRATE 1 DROP: 2 SOLUTION/ DROPS OPHTHALMIC at 08:34

## 2025-04-16 RX ADMIN — PANTOPRAZOLE SODIUM 40 MG: 40 TABLET, DELAYED RELEASE ORAL at 08:35

## 2025-04-16 NOTE — PROGRESS NOTES
Referring Provider: Guillermo Wynn MD    Reason for follow-up:  Atrial fibrillation     Patient Care Team:  Guillermo Wynn MD as PCP - General (Family Medicine)  Mirna Wynn MD (Inactive) as PCP - Family Medicine  Brian Malone MD as Consulting Physician (Cardiology)    Subjective .      ROS    Patient is having rectal bleed.    Since I have last seen, the patient has been without any chest discomfort ,shortness of breath, palpitations, dizziness or syncope.  Denies having any headache ,abdominal pain ,nausea, vomiting , diarrhea constipation, loss of weight or loss of appetite.  Denies having any excessive bruising ,hematuria  Review of all systems negative except as indicated    History  Past Medical History:   Diagnosis Date    Anemia     Anxiety     Arthritis     Depression     GERD (gastroesophageal reflux disease)     Hypertension     Pulmonary hypertension 3/19/2018    Raynauds disease     Shortness of breath 7/20/2016    Thyroid disorder        Past Surgical History:   Procedure Laterality Date    APPENDECTOMY N/A 1985    Dr. Adame    BLADDER REPAIR  04/2019    BREAST BIOPSY Left 2012    BENIGN    CARDIAC CATHETERIZATION N/A 6/30/2023    Procedure: Right and Left Heart Cath with Coronar Angiography;  Surgeon: Brian Malone MD;  Location: [facility] CATH INVASIVE LOCATION;  Service: Cardiovascular;  Laterality: N/A;    CATARACT EXTRACTION  2008    Dr. Shaffer    COLONOSCOPY N/A 2014    ENDOSCOPY N/A 6/30/2023    Procedure: ESOPHAGOGASTRODUODENOSCOPY with Dilation;  Surgeon: Scooter Bailey MD;  Location: [facility] ENDOSCOPY;  Service: Gastroenterology;  Laterality: N/A;  Post- Hiatal hernia, achalasia, food in esophagus    RECTAL PROLAPSE REPAIR  04/2019    THYROIDECTOMY, PARTIAL  2002    UPPER GASTROINTESTINAL ENDOSCOPY N/A 2017    Dr. Huerta       Family History   Problem Relation Age of Onset    No Known Problems Mother     Heart disease Father     Ovarian cancer Sister     Colon polyps Sister      Colon cancer Brother     Colon polyps Brother        Social History     Tobacco Use    Smoking status: Former     Current packs/day: 0.00     Types: Cigarettes     Quit date:      Years since quittin.3     Passive exposure: Past    Smokeless tobacco: Never   Vaping Use    Vaping status: Never Used   Substance Use Topics    Alcohol use: Yes     Alcohol/week: 7.0 standard drinks of alcohol     Types: 7 Glasses of wine per week    Drug use: Never        Medications Prior to Admission   Medication Sig Dispense Refill Last Dose/Taking    Acetaminophen 500 MG capsule Take 1 capsule by mouth Every 6 (Six) Hours As Needed.   2025 Morning    bimatoprost (LUMIGAN) 0.01 % ophthalmic drops Administer 1 drop to both eyes Every Night.   2025    brimonidine (ALPHAGAN) 0.2 % ophthalmic solution Administer 1 drop to the right eye 3 (Three) Times a Day.   2025 Noon    Cranberry 450 MG tablet Take 1 tablet by mouth Daily.   2025    Cyanocobalamin (B-12) 1000 MCG capsule Take 1 capsule by mouth Daily.   2025    escitalopram (LEXAPRO) 20 MG tablet Take 1 tablet by mouth Daily.   2025    losartan (COZAAR) 50 MG tablet Take 1 tablet by mouth Daily.   2025    multivitamin with minerals tablet tablet Take 1 tablet by mouth Daily.   2025    O2 (OXYGEN) Inhale 3 L/min Continuous.   2025    omeprazole (priLOSEC) 40 MG capsule Take 1 capsule by mouth Daily.   2025    ondansetron ODT (ZOFRAN-ODT) 4 MG disintegrating tablet Place 1 tablet on the tongue Every 8 (Eight) Hours As Needed for Vomiting or Nausea. 20 tablet 0 Past Week    revefenacin (YUPELRI) 175 MCG/3ML nebulizer solution Take 3 mL by nebulization Daily.   2025    SYNTHROID 75 MCG tablet Take 1 tablet by mouth Daily.   2025    albuterol sulfate  (90 Base) MCG/ACT inhaler Inhale 2 puffs Every 4 (Four) Hours As Needed.   More than a month    budesonide (PULMICORT) 0.5 MG/2ML nebulizer solution Take 2 mL by  "nebulization 2 (Two) Times a Day As Needed.   More than a month    formoterol (PERFOROMIST) 20 MCG/2ML nebulizer solution Take 2 mL by nebulization 2 (Two) Times a Day As Needed. (Patient not taking: Reported on 4/14/2025)   Not Taking       Allergies  Patient has no known allergies.    Scheduled Meds:brimonidine, 1 drop, Right Eye, TID  enoxaparin sodium, 30 mg, Subcutaneous, Q24H  escitalopram, 20 mg, Oral, Daily  Ketotifen Fumarate, 1 drop, Right Eye, Nightly  [Held by provider] losartan, 50 mg, Oral, Daily  metoprolol succinate XL, 12.5 mg, Oral, Q24H  pantoprazole, 40 mg, Oral, Daily  sodium chloride, 10 mL, Intravenous, Q12H      Continuous Infusions:Pharmacy to Dose enoxaparin (LOVENOX),       PRN Meds:.  acetaminophen **OR** acetaminophen **OR** acetaminophen    arformoterol    senna-docusate sodium **AND** polyethylene glycol **AND** bisacodyl **AND** bisacodyl    budesonide    nitroglycerin    ondansetron    Pharmacy to Dose enoxaparin (LOVENOX)    sodium chloride    sodium chloride    Objective     VITAL SIGNS  Vitals:    04/15/25 1620 04/15/25 1948 04/16/25 0019 04/16/25 0330   BP: 139/81 146/76 109/59 134/72   BP Location: Left arm Left arm Left arm Left arm   Patient Position: Sitting Lying Lying Lying   Pulse: 74 66 57 62   Resp: 18 20 18 16   Temp: 98.3 °F (36.8 °C) 98.1 °F (36.7 °C) 98 °F (36.7 °C) 98.1 °F (36.7 °C)   TempSrc: Oral Oral Oral Axillary   SpO2: 90% 94% 100% 100%   Weight:       Height:           Flowsheet Rows      Flowsheet Row First Filed Value   Admission Height 149.9 cm (59\") Documented at 04/14/2025 1921   Admission Weight 42.3 kg (93 lb 4.1 oz) Documented at 04/14/2025 1921              Intake/Output Summary (Last 24 hours) at 4/16/2025 0700  Last data filed at 4/15/2025 1948  Gross per 24 hour   Intake 1080 ml   Output 300 ml   Net 780 ml        TELEMETRY: Sinus rhythm    Physical Exam:  The patient is alert, oriented and in no distress.  Vital signs as noted above.     Head and " neck revealed no carotid bruits or jugular venous distension.  No thyromegaly or lymphadenopathy is present.     Lungs clear.  No wheezing.  Breath sounds are normal bilaterally.     Heart normal first and second heart sounds.  No murmur..  No pericardial rub is present.  No gallop is present.     Abdomen soft and nontender.  No organomegaly is present.     Extremities revealed good peripheral pulses without any pedal edema.     Skin warm and dry.     Musculoskeletal system is grossly normal.     CNS grossly normal.    Reviewed and updated 4/16/2025.    Results Review:   I reviewed the patient's new clinical results.  Lab Results (last 24 hours)       Procedure Component Value Units Date/Time    Basic Metabolic Panel [[id number]]  (Abnormal) Collected: 04/16/25 0346    Specimen: Blood from Arm, Left Updated: 04/16/25 0418     Glucose 92 mg/dL      BUN 15 mg/dL      Creatinine 0.56 mg/dL      Sodium 132 mmol/L      Potassium 3.6 mmol/L      Chloride 99 mmol/L      CO2 25.9 mmol/L      Calcium 9.3 mg/dL      BUN/Creatinine Ratio 26.8     Anion Gap 7.1 mmol/L      eGFR 90.7 mL/min/1.73     Narrative:      GFR Categories in Chronic Kidney Disease (CKD)      GFR Category          GFR (mL/min/1.73)    Interpretation  G1                     90 or greater         Normal or high (1)  G2                      60-89                Mild decrease (1)  G3a                   45-59                Mild to moderate decrease  G3b                   30-44                Moderate to severe decrease  G4                    15-29                Severe decrease  G5                    14 or less           Kidney failure          (1)In the absence of evidence of kidney disease, neither GFR category G1 or G2 fulfill the criteria for CKD.    eGFR calculation 2021 CKD-EPI creatinine equation, which does not include race as a factor    CBC (No Diff) [[id number]]  (Abnormal) Collected: 04/16/25 0346    Specimen: Blood from Arm, Left Updated: 04/16/25  0403     WBC 6.19 10*3/mm3      RBC 3.74 10*6/mm3      Hemoglobin 11.7 g/dL      Comment: Result checked          Hematocrit 36.7 %      MCV 98.1 fL      MCH 31.3 pg      MCHC 31.9 g/dL      RDW 13.9 %      RDW-SD 50.4 fl      MPV 8.8 fL      Platelets 271 10*3/mm3     Occult Blood, Fecal By Immunoassay - Stool, Per Rectum [[id number]]  (Abnormal) Collected: 04/15/25 1735    Specimen: Stool from Per Rectum Updated: 04/15/25 1751     Occult Blood, Fecal by Immunoassay Positive            Imaging Results (Last 24 Hours)       ** No results found for the last 24 hours. **        LAB RESULTS (LAST 7 DAYS)    CBC  Results from last 7 days   Lab Units 04/16/25  0346 04/14/25  1946   WBC 10*3/mm3 6.19 6.22   RBC 10*6/mm3 3.74* 4.41   HEMOGLOBIN g/dL 11.7* 13.9   HEMATOCRIT % 36.7 43.1   MCV fL 98.1* 97.7*   PLATELETS 10*3/mm3 271 184       BMP  Results from last 7 days   Lab Units 04/16/25  0346 04/14/25  1946   SODIUM mmol/L 132* 134*   POTASSIUM mmol/L 3.6 4.0   CHLORIDE mmol/L 99 97*   CO2 mmol/L 25.9 23.5   BUN mg/dL 15 23   CREATININE mg/dL 0.56* 0.72   GLUCOSE mg/dL 92 86       CMP   Results from last 7 days   Lab Units 04/16/25  0346 04/14/25  1946   SODIUM mmol/L 132* 134*   POTASSIUM mmol/L 3.6 4.0   CHLORIDE mmol/L 99 97*   CO2 mmol/L 25.9 23.5   BUN mg/dL 15 23   CREATININE mg/dL 0.56* 0.72   GLUCOSE mg/dL 92 86   ALBUMIN g/dL  --  4.0   BILIRUBIN mg/dL  --  0.6   ALK PHOS U/L  --  77   AST (SGOT) U/L  --  25   ALT (SGPT) U/L  --  13         BNP        TROPONIN        CoAg        Creatinine Clearance  Estimated Creatinine Clearance: 50.7 mL/min (A) (by C-G formula based on SCr of 0.56 mg/dL (L)).    ABG        Radiology  CT Angiogram Chest Pulmonary Embolism  Result Date: 4/14/2025  Impression: 1.No evidence of pulmonary embolism. No acute cardiopulmonary process. 2.Stable cardiomegaly. 3.Stable large hiatal hernia. 4.Ancillary findings as described above. Electronically Signed: Laura Sales MD  4/14/2025 11:59  PM EDT  Workstation ID: [id number]    XR Chest 1 View  Result Date: 4/14/2025  No acute cardiopulmonary disease. Electronically Signed: Ethan Carvajal MD  4/14/2025 7:36 PM EDT  Workstation ID: [id number]          EKG            I personally viewed and interpreted the patient's EKG/Telemetry data:    ECHOCARDIOGRAM:    Results for orders placed during the hospital encounter of 04/14/25    Adult Transthoracic Echo Complete W/ Cont if Necessary Per Protocol    Interpretation Summary    Left ventricular systolic function is normal. Calculated left ventricular EF = 62%    The right ventricular cavity is mildly dilated.  RV systolic function is low normal.    There is severe biatrial enlargement.    Aortic valve is heavily calcified.  Moderate aortic valve stenosis is present.    Severe tricuspid valve regurgitation is present.    Estimated right ventricular systolic pressure from tricuspid regurgitation is markedly elevated (>55 mmHg).    Electronically signed by Gilberto Sigala MD, 04/15/25, 9:18 AM EDT.          STRESS TEST  Results for orders placed during the hospital encounter of 05/01/23    Stress Test With Myocardial Perfusion One Day    Interpretation Summary  Indications  Chest pain    This study was performed under the direct supervision of Jania Layne nurse practitioner.    Resting ECG  Sinus rhythm    The patient was injected with Lexiscan intravenously while constantly monitoring electrocardiogram and vital signs.  Patient did not have any chest discomfort ST abnormalities or ectopy with injection of Lexiscan.    Cardiolite was used as an imaging agent.    Cardiolite images showed uniform distribution of radionuclide without any evidence for myocardial ischemia.    Gated SPECT images revealed normal left ventricle size and contractility with ejection fraction of 87%.    Impression  ========  Lexiscan Cardiolite test is negative for myocardial ischemia.    Gated SPECT images revealed  normal left ventricular size and contractility with ejection fraction of 87%.        Cardiolite (Tc-99m sestamibi) stress test    CARDIAC CATHETERIZATION  Results for orders placed during the hospital encounter of 06/30/23    Cardiac Catheterization/Vascular Study    Conclusion  CARDIAC CATHETERIZATION REPORT    DATE OF PROCEDURE:  6/30/2023      INDICATION FOR PROCEDURE:  Shortness of breath  Mitral regurgitation  Aortic valve stenosis      PROCEDURE PERFORMED:    Right heart catheterization, left heart catheterization, coronary angiography, left ventriculography    @@  Moderate conscious sedation was utilized with intravenous Versed and fentanyl administered by registered nurse with continuous ECG, pulse oximetry and hemodynamic monitoring supervised by myself throughout the entire procedure.  Conscious sedation time   30 minutes    I was present with the patient for the duration of moderate sedation and supervised staff who had no other duties and monitored the patient for the entire procedure.    @@  PROCEDURE COMMENTS:  Under usual sterile precautions and 1% lidocaine infiltration right femoral vein and femoral artery were percutaneously punctured and a 7 and 5 Salvadorean vascular sheaths were respectively inserted.  Crestline-Brandon catheter was used to measure right-sided pressures pulmonary capital wedge pressure and cardiac output using thermodilution technique.  Crestline-Brandon catheter was removed and subsequently left and right coronary arteriography was performed followed by left ventricular angiogram.  Hemostasis was obtained and patient was returned to the room with intact pulses.  No complications were noted.  Patient tolerated the procedure well.    FINDINGS:    1. HEMODYNAMICS:  Mean right atrial pressure 6 right ventricle 55/6 pulmonary artery 56/22 mean pulmonary artery 34 pulmonary capillary wedge pressure is 13.  Left ventricle end-diastolic pressure is normal.  No gradient was noted across the aortic  valve.    2. LEFT VENTRICULOGRAPHY:  Left ventricular size and contractility is normal with ejection fraction of 60%.  Significant mitral regurgitation is present.    3. CORONARY ANGIOGRAPHY:  Left main coronary artery is normal.  Left anterior descending artery is normal.  Circumflex coronary artery is a codominant vessel and is normal.  Right coronary artery is a codominant vessel and is normal.    SUMMARY:  Pulmonary hypertension (pulmonary artery pressure 56/22 and mean pulmonary artery pressure 34.    Left ventricular size and contractility is normal with ejection fraction of 60%.  Significant mitral regurgitation is present.    No gradient was noted across the aortic valve.    Normal epicardial coronary arteries.    RECOMMENDATIONS:  Patient has significant shortness of breath.    Patient had attempted QAMAR prior to cardiac catheterization however patient has significant problems with dysphagia and GI performed upper endoscopy that revealed severe tortuosity of the esophagus achalasia and severe hiatal hernia with half of the stomach in the chest cavity.  QAMAR was not performed.    Patient's symptoms could be from large hiatal hernia in combination with mitral regurgitation which appears to be significant on left ventricular angiogram although mild on transthoracic echocardiogram.  Performing QAMAR would be extremely difficult and risky due to upper endoscopy findings.    Patient has significant pulmonary hypertension likely from mitral regurgitation.    We will review the data with cardiovascular surgeon however lack of ability to perform transesophageal echocardiogram is a big concern.    We will have general surgery consultation in coordination with GI service for treatment of large hiatal hernia.  =======  Upper endoscopy results as below.  Impression:  1.  Esophagus was full of liquid and some semisolid food and was massively dilated with severe ulcerations and signs of stasis throughout the entire esophagus.   At 25 cm from the incisors, there is a very sharp turn at 90 degrees followed by another 90 degree turn indicating a very tortuous esophagus.  There is no stricture in the esophagus was widely patent into the stomach at 30 cm.  2.  Very large hiatal hernia with half of the stomach and the patient's chest.  There was food sitting in the hiatal hernia.  It was a very tight narrowing entering the distal stomach through the diaphragmatic hiatus which is likely why the hiatal hernia is pocketed with food  3.  Some larger gastric polyps with the appearance of hyperplastic polyps in the fundus were present and some in the hiatal hernia itself  4.  Normal duodenal mucosa visualized to D3      Recommendations:  Given the patient's shortness of breath and early satiety with changes of her esophagus, I would recommend surgery for her large hiatal hernia.  This will likely improve her respiratory status and her early satiety.  P.o. twice daily PPI  Follow-up in GI clinic    Scooter Bailey MD  ]]]]]]]]]]]]]]]                OTHER:         Assessment & Plan     Principal Problem:    Generalized weakness  Active Problems:    Aortic stenosis    Hyperlipidemia    Hypothyroidism    Mitral valve insufficiency    Pulmonary hypertension    Shortness of breath    Raynaud's disease    Hiatal hernia    Hypertensive disorder    Positive D dimer    New onset a-fib    Chronic respiratory failure with hypoxia    Restrictive lung disease    CLAY (generalized anxiety disorder)        /////////////////////////////  History  ====================   -Shortness of breath and chest discomfort     - history of 2 to 3+ mitral regurgitation.     - Moderate aortic valve stenosis with gradient of 22/12 mmHg and valve area of 1.6 cm².  However at cardiac cath no gradient was noted across the aortic valve.     - Large hiatal hernia (see recent endoscopy)     Cardiac cath 6/30/2023  Pulmonary hypertension (pulmonary artery pressure 56/22 and mean pulmonary  artery pressure 34.  Left ventricular size and contractility is normal with ejection fraction of 60%.  Significant mitral regurgitation is present.  No gradient was noted across the aortic valve.  Normal epicardial coronary arteries.     Lexiscan Cardiolite test-normal-5/1/2023     Echocardiogram 5/1/2023 revealed  Left atrial enlargement  Mitral annular calcification  Moderate aortic valve stenosis with gradient of 22/12 mmHg and valve area of 1.6 cm².  Normal left ventricular function.     Attempted QAMAR 6/30/2023-not successful due to large hiatal hernia and significant tortuosity of the esophagus.     Upper endoscopy results as below.  Dr. Scooter Bailey.-6/30/2023  Impression:  1.  Esophagus was full of liquid and some semisolid food and was massively dilated with severe ulcerations and signs of stasis throughout the entire esophagus.  At 25 cm from the incisors, there is a very sharp turn at 90 degrees followed by another 90 degree turn indicating a very tortuous esophagus.  There is no stricture in the esophagus was widely patent into the stomach at 30 cm.  2.  Very large hiatal hernia with half of the stomach and the patient's chest.  There was food sitting in the hiatal hernia.  It was a very tight narrowing entering the distal stomach through the diaphragmatic hiatus which is likely why the hiatal hernia is pocketed with food  3.  Some larger gastric polyps with the appearance of hyperplastic polyps in the fundus were present and some in the hiatal hernia itself  4.  Normal duodenal mucosa visualized to D3     Recommendations:  Given the patient's shortness of breath and early satiety with changes of her esophagus, I would recommend surgery for her large hiatal hernia.  This will likely improve her respiratory status and her early satiety.  P.o. twice daily PPI  Follow-up in GI clinic     Seen by Dr. Huerta.  Did not think patient would be a surgical candidate for hiatal hernia.     -anemia     -anxiety  depression     -History of esophageal stricture.     - status post partial thyroidectomy ganglionectomy and appendectomy     -former smoker     -hypothyroidism     -family history of coronary artery disease     - history of Raynaud's phenomena  ====================   Plan  ================  Patient is having rectal bleed.  Patient is maintaining sinus rhythm.  Discontinue Lovenox.  GI consult anticipated.    Patient was admitted to the hospital with generalized weakness.  Patient was thought to have atrial fibrillation although the first EKG showed sinus rhythm with premature atrial contractions.  Patient is in sinus rhythm now.  Patient's blood pressure is somewhat borderline.  Will hold losartan.  Patient is on low-dose beta-blocker.  (12.5 mg twice daily.  Patient is maintaining sinus rhythm.    Shortness of breath-doing better.  In the past, patient was seen by pulmonologist.    Patient is undergoing pulmonary rehabilitation at [facility].     Mitral regurgitation-stable     Moderate aortic valve stenosis.  No gradient was noted across the aortic valve at cardiac catheterization.     Pulmonary hypertension probably secondary to mitral regurgitation.      Patient had attempted QAMAR prior to cardiac catheterization however patient has significant problems with dysphagia and GI performed upper endoscopy that revealed severe tortuosity of the esophagus achalasia and severe hiatal hernia with half of the stomach in the chest cavity.  QAMAR was not performed.     Patient's symptoms could be from large hiatal hernia in combination with mitral regurgitation which appears to be significant on left ventricular angiogram although mild on transthoracic echocardiogram.  Performing QAMAR would be extremely difficult and risky due to upper endoscopy findings.     Patient has significant pulmonary hypertension likely from mitral regurgitation.     GI follow-up with Dr. Scooter Bailey or Dr. Huerta regarding  large hiatal hernia and to discuss surgical options.  Apparently Dr. Huerta did not think patient would be a surgical candidate.     Hypothyroidism-on levothyroxine     Medications were reviewed and updated.  Patient is on subcu Lovenox (DVT prophylaxis) Lexapro metoprolol XL 12.5 mg twice daily pantoprazole.    History of shortness of breath-multifactorial including pulmonary hypertension mitral regurgitation hiatal hernia significant kyphoscoliosis.     Further plan will depend on patient's progress.    Reviewed and updated 4/16/2025.    //////////////////////////////////////////         Brian Malone MD  04/16/25  07:00 EDT

## 2025-04-16 NOTE — PROGRESS NOTES
Nutrition Services  Patient Name: Anitra Nobles  YOB: 1940  MRN: [id number]  Admission date: 4/14/2025    PROGRESS NOTE      Nutrition Intervention Updates: Continue ONS prescription.     Moderate chronic disease related malnutrition as evidenced by chronic poor oral intake as evidenced by 10% unintentional weight loss in last 6 months and moderate-severe fat and muscle wasting per NFPE.  (See MSA below).        Encounter Information: Pt complaint of rectal bleeding, GI following. Upon visit, pt laying down with family at bedside. Pt stated having a good appetite while in hospital and eating all her meals. Pt said appetite at home is lower typically. Pt states she eats smaller meals at home r/t hiatal hernia. Pt did note recent weight loss, but unsure how much. NFPE completed, consistent with nutrition diagnosis of malnutrition using AND/ASPEN criteria. See MSA below.        PO Diet: Diet: Regular/House; Texture: Regular (IDDSI 7); Fluid Consistency: Thin (IDDSI 0)   PO Supplements: Boost Original BID (Provides 480 kcals, 20 g protein if consumed)    PO Intake:  75% avg intake x last 5 meals       Current nutrition support: -   Nutrition support review: -       Labs (reviewed below): Reviewed. Managed per attending.        GI Function:  BM documented 4/15, occult stool -- GI following        Brief weight review 17# weight loss x 1 year. (15% loss)   Wt Readings from Last 10 Encounters:   04/15/25 0449 42.2 kg (93 lb 0.6 oz)   04/15/25 0350 42.2 kg (93 lb)   04/14/25 1921 42.3 kg (93 lb 4.1 oz)   11/27/24 1400 48.1 kg (106 lb)   09/26/24 2200 46.8 kg (103 lb 2.8 oz)   09/26/24 1726 46.8 kg (103 lb 2.8 oz)   08/29/24 1144 48.5 kg (107 lb)   05/02/24 1258 49.4 kg (109 lb)   03/25/24 0829 48.5 kg (107 lb)   02/22/24 1058 49.9 kg (110 lb)   11/30/23 1047 54 kg (119 lb)   10/31/23 1256 54 kg (119 lb)   07/24/23 1405 54.9 kg (121 lb)        Results from last 7 days   Lab Units 04/16/25  8855  04/14/25  1946   SODIUM mmol/L 132* 134*   POTASSIUM mmol/L 3.6 4.0   CHLORIDE mmol/L 99 97*   CO2 mmol/L 25.9 23.5   BUN mg/dL 15 23   CREATININE mg/dL 0.56* 0.72   CALCIUM mg/dL 9.3 10.0   BILIRUBIN mg/dL  --  0.6   ALK PHOS U/L  --  77   ALT (SGPT) U/L  --  13   AST (SGOT) U/L  --  25   GLUCOSE mg/dL 92 86     Results from last 7 days   Lab Units 04/16/25  0346   HEMOGLOBIN g/dL 11.7*   HEMATOCRIT % 36.7       Malnutrition Severity Assessment      Patient meets criteria for : (P) Moderate (non-severe) Malnutrition  Malnutrition Type (Last 8 Hours)       Malnutrition Severity Assessment       Row Name 04/16/25 1329       Malnutrition Severity Assessment    Malnutrition Type Chronic Disease - Related Malnutrition (P)       Row Name 04/16/25 1329       Unintentional Weight Loss     Unintentional Weight Loss Findings Moderate (P)     Unintentional Weight Loss  Weight loss of 10% in six months (P)       Row Name 04/16/25 1329       Muscle Loss    Loss of Muscle Mass Findings Moderate (P)     Yazdanism Region Moderate - slight depression (P)     Clavicle Bone Region Severe - protruding prominent bone (P)     Acromion Bone Region Severe - squared shoulders, bones, and acromion process protrusion prominent (P)     Patellar Region Moderate - patella more prominent, less muscle definition around patella (P)     Anterior Thigh Region Moderate - mild depression on inner thigh (P)     Posterior Calf Region Moderate - some roundness, slight firmness (P)       Row Name 04/16/25 1329       Fat Loss    Subcutaneous Fat Loss Findings Moderate (P)     Orbital Region  Moderate -  somewhat hollowness, slightly dark circles (P)     Upper Arm Region Severe - mostly skin, very little space between folds, fingers touch (P)       Row Name 04/16/25 1329       Criteria Met (Must meet criteria for severity in at least 2 of these categories: M Wasting, Fat Loss, Fluid, Secondary Signs, Wt. Status, Intake)    Patient meets criteria for  Moderate  (non-severe) Malnutrition (P)                            RD to follow up per protocol.    Electronically signed by:  Samantha Reyes  04/16/25 13:05 EDT

## 2025-04-16 NOTE — CASE MANAGEMENT/SOCIAL WORK
Start PACC Note    Home Health Referral    Evaluated patient on Home Care and services available. Patient offered choice of available HHC and agreeable to RN/PT services with [facility].    Care Types:   Isolation Precautions: No active isolations    Social Determinants of Health:  Tobacco Use: Medium Risk (4/14/2025)    Patient History     Smoking Tobacco Use: Former     Smokeless Tobacco Use: Never     Passive Exposure: Past     Social History     Substance and Sexual Activity   Alcohol Use Yes    Alcohol/week: 7.0 standard drinks of alcohol    Types: 7 Glasses of wine per week     Social History     Substance and Sexual Activity   Drug Use Never       Does the patient have any financial resource strain?   Does the patient have any food insecurities?   Does the patient have any housing instabilities?     If any of the above is noted as yes - consider a MSW evaluation once the patient returns home.    START PATIENT REGISTRATION INFORMATION  Order Information  Order Signing Physician: Guillermo Wynn MD  Service Ordered RN?: Yes  Service Ordered PT?: Yes  Service Ordered OT?: No  Service Ordered ST?: No  Service Ordered MSW?: No  Service Ordered HHA?: No  Following Physician: Guillermo Wynn MD  Following Physician Phone: [phone]  Overseeing Physician: Guillermo Wynn MD  (Required for Residents Only)  Agreeable to Follow? Yes  Date/Time of Call 04/16/25 16:56 EDT, Spoke with: per md order    Care Coordination  Same Day SOC?: No  Primary Care Physician: Guillermo Wynn MD  Primary Care Physician Phone: [phone]  Primary Care Physician Address: [address] [place] [address] [place] [address]  Visit Instructions: N/A  Service Discharge Location Type: Home  Service Facility Name: N/A  Service Floor Facility: N/A  Service Room No: N/A    Demographics  Patient Last Name: Giuliano  Patient First Name: Anitra  Language/Communication Barrier: none  Service Address: [address]  Service City: [facility]  [place]  Service State: [place]  Service Zip: [address]  Service Home Phone: [phone]  Other Phone Numbers:   Telephone Information:   Mobile [phone]     Emergency Contact:   Extended Emergency Contact Information  Primary Emergency Contact: Tere Hinds   [place]  Mobile Phone: [phone]  Relation: Daughter    Admission Information  Admit Date: 4/14/2025  Patient Status at Discharge: Inpatient  Admitting Diagnosis: Generalized weakness [R53.1]    Caregiver Information  Caregiver First Name:   Caregiver Last Name:   Caregiver Relationship to Patient:   Caregiver Phone Number:   Caregiver Notes:     HITECH  [employer] List  No      END PATIENT REGISTRATION INFORMATION    Start PACC Summary    Additional Comments: none    END PACC Summary    Discharge Date: Pending    Referral Source: [facility]ACE[facility] Vang    Signed By: Kay Jose RN, 4/16/2025, 16:56 EDT     Date/Time: 04/16/25 16:56 EDT    End PACC Note

## 2025-04-16 NOTE — PLAN OF CARE
Goal Outcome Evaluation:  Plan of Care Reviewed With: patient        Progress: no change   Pt resting, VSS, no complaints during this shift. 3L NC, BP stable

## 2025-04-16 NOTE — CASE MANAGEMENT/SOCIAL WORK
Continued Stay Note  [facility]     Patient Name: Anitra Nobles  MRN: [id number]  Today's Date: 4/16/2025    Admit Date: 4/14/2025    Plan: Anticipate routine home alone with [facility] (accepted, order in place). New rolling walker through [employer] (order in place). Current home O2 3L through [facility].   Discharge Plan       Row Name 04/16/25 1543       Plan    Plan Comments DC orders noted. CM updated [facility] liaison Kay CAROLINA and [employer] liaison Desi FORBES.               Expected Discharge Date and Time       Expected Discharge Date Expected Discharge Time    Apr 16, 2025           Beena Degroot RN     Office Phone: [phone]  Office Cell: [phone]

## 2025-04-16 NOTE — CONSULTS
GI CONSULT  NOTE:    Referring Provider: Dr. Wynn    Chief complaint: Positive Hemoccult, rectal prolapse    Subjective .     History of present illness: Anitra Nobles is a 84 y.o. female with a past medical history of  large hiatal hernia, rectal prolapse, restrictive lung disease, Raynaud's, pulmonary hypertension, and hypothyroidism who was directly admitted to [facility] per PCP for A-fib noted at [facility].  At that time, patient's main complaints were weakness.  Shortly after arrival to hospital, patient was hypotensive with SBP in the 60s.  GI was consulted for positive Hemoccult and rectal prolapse.  Patient reports x 2 episodes of bright red rectal bleeding yesterday.  Reports x 1 soft formed brown BM today.  Reports she had had surgery for rectal prolapse repair in the past, and she does not wish to undergo surgical correction again.  Denies rectal pain.  She had received Lovenox while inpatient, but she does not normally take anticoagulants or antiplatelets at home.  Patient had converted into normal sinus rhythm without medical intervention.  Thus, cardiology is not recommending long-term anticoagulants.  Patient reports she will normally skip 2-3 days between BMs at home.  She had tried MiraLAX/Benefiber in the past, which resulted in diarrhea.  She did not try to titrate either medication.  Denies abdominal pain, nausea, or vomiting.  Denies unintentional weight loss.  Reports heartburn well-controlled with omeprazole daily at home.      Endo History:  6/30/2023 EGD by Dr. Bailey esophagus full of liquid and some semisolid food. Very tortuous esophagus. No stricture in esophagus. Large HH with half of the stomach and patient's chest. Large gastric polyps with appearance of hyperplastic polyps in the fundus. Normal duodenum.  Colonoscopy, 3/12/2015, Moderate diverticulosis of the descending colon and sigmoid colon, Erythema and erosion in the distal rectum compatible with prolapse, Normal  mucosa in the whole colon and Grade/Stage II internal hemorrhoids  EGD, 3/21/2017, Stricture in the gastroesophageal junction. (Dilation), Hiatal Hernia in the, LA Grade A esophagitis in the gastroesophageal junction compatible with mild distal esophagitis, Erythema, friability, erosion and abnormal vascularity in the stomach body compatible with Sukhdeep's erosions and Normal mucosa in the duodenum    Past Medical History:  Past Medical History:   Diagnosis Date    Anemia     Anxiety     Arthritis     Depression     GERD (gastroesophageal reflux disease)     Hypertension     Pulmonary hypertension 3/19/2018    Raynauds disease     Shortness of breath 2016    Thyroid disorder        Past Surgical History:  Past Surgical History:   Procedure Laterality Date    APPENDECTOMY N/A     Dr. Adame    BLADDER REPAIR  2019    BREAST BIOPSY Left 2012    BENIGN    CARDIAC CATHETERIZATION N/A 2023    Procedure: Right and Left Heart Cath with Coronar Angiography;  Surgeon: Brian Malone MD;  Location: [facility] CATH INVASIVE LOCATION;  Service: Cardiovascular;  Laterality: N/A;    CATARACT EXTRACTION      Dr. Shaffer    COLONOSCOPY N/A     ENDOSCOPY N/A 2023    Procedure: ESOPHAGOGASTRODUODENOSCOPY with Dilation;  Surgeon: Scooter Bailey MD;  Location: [facility] ENDOSCOPY;  Service: Gastroenterology;  Laterality: N/A;  Post- Hiatal hernia, achalasia, food in esophagus    RECTAL PROLAPSE REPAIR  2019    THYROIDECTOMY, PARTIAL      UPPER GASTROINTESTINAL ENDOSCOPY N/A     Dr. Huerta       Social History:  Social History     Tobacco Use    Smoking status: Former     Current packs/day: 0.00     Types: Cigarettes     Quit date:      Years since quittin.3     Passive exposure: Past    Smokeless tobacco: Never   Vaping Use    Vaping status: Never Used   Substance Use Topics    Alcohol use: Yes     Alcohol/week: 7.0 standard drinks of alcohol     Types: 7 Glasses of wine per week    Drug use:  Never       Family History:  Family History   Problem Relation Age of Onset    No Known Problems Mother     Heart disease Father     Ovarian cancer Sister     Colon polyps Sister     Colon cancer Brother     Colon polyps Brother        Medications:  Medications Prior to Admission   Medication Sig Dispense Refill Last Dose/Taking    Acetaminophen 500 MG capsule Take 1 capsule by mouth Every 6 (Six) Hours As Needed.   4/14/2025 Morning    bimatoprost (LUMIGAN) 0.01 % ophthalmic drops Administer 1 drop to both eyes Every Night.   4/13/2025    brimonidine (ALPHAGAN) 0.2 % ophthalmic solution Administer 1 drop to the right eye 3 (Three) Times a Day.   4/14/2025 Noon    Cranberry 450 MG tablet Take 1 tablet by mouth Daily.   4/13/2025    Cyanocobalamin (B-12) 1000 MCG capsule Take 1 capsule by mouth Daily.   4/13/2025    escitalopram (LEXAPRO) 20 MG tablet Take 1 tablet by mouth Daily.   4/13/2025    losartan (COZAAR) 50 MG tablet Take 1 tablet by mouth Daily.   4/13/2025    multivitamin with minerals tablet tablet Take 1 tablet by mouth Daily.   4/13/2025    O2 (OXYGEN) Inhale 3 L/min Continuous.   4/14/2025    omeprazole (priLOSEC) 40 MG capsule Take 1 capsule by mouth Daily.   4/14/2025    ondansetron ODT (ZOFRAN-ODT) 4 MG disintegrating tablet Place 1 tablet on the tongue Every 8 (Eight) Hours As Needed for Vomiting or Nausea. 20 tablet 0 Past Week    revefenacin (YUPELRI) 175 MCG/3ML nebulizer solution Take 3 mL by nebulization Daily.   4/13/2025    SYNTHROID 75 MCG tablet Take 1 tablet by mouth Daily.   4/14/2025    albuterol sulfate  (90 Base) MCG/ACT inhaler Inhale 2 puffs Every 4 (Four) Hours As Needed.   More than a month    budesonide (PULMICORT) 0.5 MG/2ML nebulizer solution Take 2 mL by nebulization 2 (Two) Times a Day As Needed.   More than a month    formoterol (PERFOROMIST) 20 MCG/2ML nebulizer solution Take 2 mL by nebulization 2 (Two) Times a Day As Needed. (Patient not taking: Reported on  4/14/2025)   Not Taking       Scheduled Meds:brimonidine, 1 drop, Right Eye, TID  [Held by provider] enoxaparin sodium, 30 mg, Subcutaneous, Q24H  escitalopram, 20 mg, Oral, Daily  Ketotifen Fumarate, 1 drop, Right Eye, Nightly  [Held by provider] losartan, 50 mg, Oral, Daily  metoprolol succinate XL, 12.5 mg, Oral, Q24H  pantoprazole, 40 mg, Oral, Daily  sodium chloride, 10 mL, Intravenous, Q12H      Continuous Infusions:Pharmacy to Dose enoxaparin (LOVENOX),       PRN Meds:.  acetaminophen **OR** acetaminophen **OR** acetaminophen    arformoterol    senna-docusate sodium **AND** polyethylene glycol **AND** bisacodyl **AND** bisacodyl    budesonide    nitroglycerin    ondansetron    Pharmacy to Dose enoxaparin (LOVENOX)    sodium chloride    sodium chloride    ALLERGIES:  Patient has no known allergies.    ROS:  The following systems were reviewed and negative;   Constitution:  No fevers, chills, no unintentional weight loss  Skin: no rash, no jaundice  Eyes:  No blurry vision, no eye pain  HENT:  No change in hearing or smell  Resp:  No dyspnea or cough  CV:  No chest pain or palpitations  :  No dysuria, hematuria  Musculoskeletal:  No leg cramps or arthralgias  Neuro:  No tremor, no numbness  Psych:  No depression or confusion    Objective     Vital Signs:   Vitals:    04/16/25 0019 04/16/25 0330 04/16/25 0729 04/16/25 0835   BP: 109/59 134/72 122/74 155/83   BP Location: Left arm Left arm     Patient Position: Lying Lying     Pulse: 57 62 68 69   Resp: 18 16 18    Temp: 98 °F (36.7 °C) 98.1 °F (36.7 °C) 98 °F (36.7 °C)    TempSrc: Oral Axillary Oral    SpO2: 100% 100% 100%    Weight:       Height:           Physical Exam:       General Appearance:    Awake and alert, in no acute distress, sitting in recliner   Head:    Normocephalic, without obvious abnormality, atraumatic   Throat:   No oral lesions, no thrush, oral mucosa moist   Lungs:     Respirations regular, even and unlabored, nasal cannula   Chest  Wall:    No abnormalities observed   Abdomen:     Soft, non-tender, no rebound or guarding, non-distended       Extremities:   Moves all extremities, no edema, no cyanosis       Skin:   No rash, no jaundice       Neurologic:   Cranial nerves 2 - 12 grossly intact       Results Review:   I reviewed the patient's labs and imaging.  Results from last 7 days   Lab Units 04/16/25  0346 04/14/25  1946   WBC 10*3/mm3 6.19 6.22   HEMOGLOBIN g/dL 11.7* 13.9   PLATELETS 10*3/mm3 271 184     Results from last 7 days   Lab Units 04/16/25  0346 04/14/25  1946   SODIUM mmol/L 132* 134*   POTASSIUM mmol/L 3.6 4.0   CHLORIDE mmol/L 99 97*   CO2 mmol/L 25.9 23.5   BUN mg/dL 15 23   CREATININE mg/dL 0.56* 0.72   GLUCOSE mg/dL 92 86   ALBUMIN g/dL  --  4.0   BILIRUBIN mg/dL  --  0.6   ALK PHOS U/L  --  77   AST (SGOT) U/L  --  25   ALT (SGPT) U/L  --  13     Estimated Creatinine Clearance: 50.7 mL/min (A) (by C-G formula based on SCr of 0.56 mg/dL (L)).  Lab Results   Component Value Date    HGBA1C 5.28 04/14/2025         Infection     UA    Results from last 7 days   Lab Units 04/14/25 2125   NITRITE UA  Negative   WBC UA /HPF 0-2   BACTERIA UA /HPF None Seen   SQUAM EPITHEL UA /HPF 3-6*     Microbiology Results (last 10 days)       ** No results found for the last 240 hours. **          Imaging Results (Last 72 Hours)       Procedure Component Value Units Date/Time    CT Angiogram Chest Pulmonary Embolism [[id number]] Collected: 04/14/25 2357     Updated: 04/15/25 0001    Narrative:      CT ANGIOGRAM CHEST PULMONARY EMBOLISM    Date of Exam: 4/14/2025 11:55 PM EDT    Indication: elevated d. dimer.  in new afib..    Comparison: 4/14/2025, 3/14/2024.    Technique: Axial CT images were obtained of the chest after the uneventful intravenous administration of iodinated contrast utilizing pulmonary embolism protocol.  In addition, a 3-D volume rendered image was created for interpretation.  Sagittal and   coronal reconstructions were  performed.  Automated exposure control and iterative reconstruction methods were used.      Findings:    Pulmonary arteries: Adequate opacification of the pulmonary arteries. No evidence of acute pulmonary embolism.    Lungs and Pleura: The lungs are clear. No nodule. No consolidation. No pleural fluid. Redemonstration of a large hiatal hernia with the majority of the stomach seen above the left hemidiaphragm. The esophagus appears patulous and contains fluid, similar   as compared to the previous study.    Mediastinum/[place]: No mediastinal or hilar lymphadenopathy.    Lymph nodes: No axillary or supraclavicular adenopathy.    Cardiovascular: The heart appears enlarged, similar as compared to the previous study.. The pericardium is normal. The aorta and its arch branch vessels are unremarkable.       Upper Abdomen: The upper abdominal contents are unremarkable.          Bones and Soft Tissue: No suspicious osseous lesion.        Impression:      Impression:  1.No evidence of pulmonary embolism. No acute cardiopulmonary process.  2.Stable cardiomegaly.  3.Stable large hiatal hernia.  4.Ancillary findings as described above.            Electronically Signed: Laura Sales MD    4/14/2025 11:59 PM EDT    Workstation ID: [id number]    XR Chest 1 View [[id number]] Collected: 04/14/25 1935     Updated: 04/14/25 1938    Narrative:      XR CHEST 1 VW    Date of Exam: 4/14/2025 6:40 PM EDT    Indication: New Admission    Comparison: None available.    Findings: Large hiatal hernia again noted. No consolidation. No pneumothorax or pleural effusion. The clavicles are intact. Atheromatous disease of the aortic arch. No rib fractures.      Impression:      No acute cardiopulmonary disease.      Electronically Signed: Ethan Carvajal MD    4/14/2025 7:36 PM EDT    Workstation ID: [id number]          Patient is a 84-year-old female who was directly admitted to [facility] per PCP for [facility]fib [facility].  At that time, patient's main  complaints were weakness.  Shortly after arrival to hospital, patient was hypotensive with SBP in the 60s.  GI was consulted for positive Hemoccult and rectal prolapse.    Diarrhea 1.69  Lactate unremarkable  See above for other pertinent lab and imaging results.    ASSESSMENT:  Hematochezia  Rectal prolapse  Large hiatal hernia  GERD  Constipation  New onset A-fib  Elevated D-dimer  History of restrictive lung disease on 3 L nasal cannula at baseline  History of hypothyroidism    PLAN:  - X 2 episodes of hematochezia noted yesterday.  Patient had received Lovenox while inpatient and SBP was noted to be in 60s during A-fib. Possible ischemic colitis.  No plans for long-term anticoagulation as patient has converted to normal sinus rhythm without medical intervention.  No rectal bleeding noted with BM today.  - Patient does not wish to undergo surgical intervention for rectal prolapse, and she has not been deemed an appropriate surgical candidate for hiatal hernia repair due to restrictive lung disease in the past.  - No plans for further evaluation with endoscopies while inpatient.  Patient is to follow-up with GI as outpatient as scheduled on 6/5/2025 with CLINT Solares.  - Okay to discharge home from GI standpoint.  Recommend checking CBC as outpatient with PCP.  If rectal bleeding returns, can consider outpatient evaluation.  - Continue PPI daily.  - Titrate Benefiber and MiraLAX for goal of 1-2 soft formed Bms daily.  - Supportive care    I discussed the patients findings and my recommendations with the patient.    We appreciate the referral    Electronically signed by ALEX Dawkins, 04/16/25, 12:24 PM EDT.

## 2025-04-17 NOTE — CASE MANAGEMENT/SOCIAL WORK
Case Management Discharge Note      Final Note: Home with [facility]    Provided Post Acute Provider List?: Yes  Post Acute Provider List: Home Health  Provided Post Acute Provider Quality & Resource List?: Yes  Post Acute Provider Quality and Resource List: Home Health  Delivered To: Patient  Method of Delivery: In person    Selected Continued Care - Discharged on 4/16/2025 Admission date: 4/14/2025 - Discharge disposition: Home-Health Care Svc            Durable Medical Equipment Coordination complete.      Service Provider Services Address Phone Fax Patient Preferred    [facility] [address] [place] [address] [phone] --                Home Medical Care Coordination complete.      Service Provider Services Address Phone Fax Patient Preferred    [facility] [address] [address] [phone] Yes                 Transportation Services  Private: Car    Final Discharge Disposition Code: 06 - home with home health care

## 2025-04-17 NOTE — OUTREACH NOTE
Prep Survey      Flowsheet Row Responses   [facility] facility patient discharged from? [facility]   Is LACE score < 7 ? No   Eligibility Readm Mgmt   Discharge diagnosis Generalized weakness   Does the patient have one of the following disease processes/diagnoses(primary or secondary)? Other   Does the patient have Home health ordered? Yes   What is the Home health agency?  [facility]   Is there a DME ordered? Yes   What DME was ordered? [employer] for rolling walker   Prep survey completed? Yes            JANET CROW - Registered Nurse

## 2025-04-18 ENCOUNTER — TELEPHONE (OUTPATIENT)
Dept: CARDIOLOGY | Facility: CLINIC | Age: 85
End: 2025-04-18
Payer: MEDICARE

## 2025-04-18 NOTE — TELEPHONE ENCOUNTER
Caller: Anitra Nobles    Relationship: Self    Best call back number:       PATIENT IS NEEDING HOSPITAL FOLLOW UP

## 2025-04-21 ENCOUNTER — OFFICE VISIT (OUTPATIENT)
Dept: ORTHOPEDIC SURGERY | Facility: CLINIC | Age: 85
End: 2025-04-21
Payer: MEDICARE

## 2025-04-21 ENCOUNTER — READMISSION MANAGEMENT (OUTPATIENT)
Dept: CALL CENTER | Facility: HOSPITAL | Age: 85
End: 2025-04-21
Payer: MEDICARE

## 2025-04-21 VITALS — HEIGHT: 59 IN | BODY MASS INDEX: 20.04 KG/M2 | WEIGHT: 99.4 LBS | RESPIRATION RATE: 16 BRPM

## 2025-04-21 DIAGNOSIS — M19.011 OSTEOARTHRITIS OF RIGHT GLENOHUMERAL JOINT: ICD-10-CM

## 2025-04-21 DIAGNOSIS — M25.511 ACUTE PAIN OF RIGHT SHOULDER: Primary | ICD-10-CM

## 2025-04-21 PROCEDURE — 99203 OFFICE O/P NEW LOW 30 MIN: CPT | Performed by: ORTHOPAEDIC SURGERY

## 2025-04-21 NOTE — PROGRESS NOTES
Patient ID: Anitra Nobles is a 84 y.o. female.    Chief Complaint:    Chief Complaint   Patient presents with    Right Shoulder - Pain, Initial Evaluation     Pain 8        HPI:  This is an 84-year-old female here with longstanding right shoulder pain.  No prior shoulder surgery.  She has had treatment with periodic steroid injections that sounds like most recently about a month ago.  She is here from Dr. Fox.  Past Medical History:   Diagnosis Date    Anemia     Anxiety     Arthritis     Depression     GERD (gastroesophageal reflux disease)     Hypertension     Pulmonary hypertension 3/19/2018    Raynauds disease     Shortness of breath 2016    Thyroid disorder        Past Surgical History:   Procedure Laterality Date    APPENDECTOMY N/A     Dr. Adame    BLADDER REPAIR  2019    BREAST BIOPSY Left 2012    BENIGN    CARDIAC CATHETERIZATION N/A 2023    Procedure: Right and Left Heart Cath with Coronar Angiography;  Surgeon: Brian Malone MD;  Location: [facility] CATH INVASIVE LOCATION;  Service: Cardiovascular;  Laterality: N/A;    CATARACT EXTRACTION      Dr. Shaffer    COLONOSCOPY N/A     ENDOSCOPY N/A 2023    Procedure: ESOPHAGOGASTRODUODENOSCOPY with Dilation;  Surgeon: Scooter Bailey MD;  Location: [facility] ENDOSCOPY;  Service: Gastroenterology;  Laterality: N/A;  Post- Hiatal hernia, achalasia, food in esophagus    RECTAL PROLAPSE REPAIR  2019    THYROIDECTOMY, PARTIAL      UPPER GASTROINTESTINAL ENDOSCOPY N/A     Dr. Huerta       Family History   Problem Relation Age of Onset    No Known Problems Mother     Heart disease Father     Ovarian cancer Sister     Colon polyps Sister     Colon cancer Brother     Colon polyps Brother           Social History     Occupational History    Not on file   Tobacco Use    Smoking status: Former     Current packs/day: 0.00     Types: Cigarettes     Quit date:      Years since quittin.3     Passive exposure: Past     "Smokeless tobacco: Never   Vaping Use    Vaping status: Never Used   Substance and Sexual Activity    Alcohol use: Yes     Alcohol/week: 7.0 standard drinks of alcohol     Types: 7 Glasses of wine per week    Drug use: Never    Sexual activity: Defer      Review of Systems   Cardiovascular:  Negative for chest pain.   Musculoskeletal:  Positive for arthralgias.       Objective:    Resp 16   Ht 149.9 cm (59\")   Wt 45.1 kg (99 lb 6.4 oz)   BMI 20.08 kg/m²     Physical Examination:  Right shoulder demonstrates intact skin no bony tenderness moderate pain over the bicep groove and impingement area passive elevation 20 external rotation 20 with crepitance and pain and weakness on Speed [place] supraspinatus testing    Imaging:  right Shoulder X-Ray  Indication: Shoulder pain denies trauma  AP Y and Lateral views  Findings: End-stage degenerative joint disease with near complete loss of acromiohumeral distance with medialization and retroversion of the glenoid  no bony lesion  Soft tissues normal  decreased joint spaces  Hardware appropriately positioned not applicable      no prior studies available for comparison.    Assessment:  Right shoulder degenerative joint disease    Plan:  Further conservative or surgical options discussed.  She would be a candidate for reverse total shoulder arthroplasty in my opinion however she expressed that she has been told she is not a candidate for surgery due to pulmonary issues.  I have asked her for clarification at her next either primary care or pulmonology visit.  If it is acceptable risk to proceed with surgery she can see me for staging with CT scan      Procedures         Disclaimer: Part of this note may be an electronic transcription/translation of spoken language to printed text using the Dragon Dictation System  "

## 2025-04-21 NOTE — OUTREACH NOTE
Medical Week 1 Survey      Flowsheet Row Responses   [facility] patient discharged from? [facility]   Does the patient have one of the following disease processes/diagnoses(primary or secondary)? Other   Week 1 attempt successful? Yes   Call start time 1900   Call end time 1907   Discharge diagnosis Generalized weakness   Meds reviewed with patient/caregiver? Yes   Is the patient having any side effects they believe may be caused by any medication additions or changes? Yes   Side effects comments  Diarrhea   Does the patient have all medications ordered at discharge? Yes   Is the patient taking all medications as directed (includes completed medication regime)? Yes   Medication comments Discussed side effects of new medication.  States she has had diarrhea but it is starting to improve.  If it worsens she will contact her PCP due possible side effect.   Does the patient have a primary care provider?  Yes   Does the patient have an appointment with their PCP within 7 days of discharge? Yes   Has the patient kept scheduled appointments due by today? N/A   What is the Home health agency?  [facility]   Has home health visited the patient within 72 hours of discharge? No   Home health comments Phone # given to pt to call due to it is after hours.   What DME was ordered? [employer] for rolling walker   Has all DME been delivered? Yes   Psychosocial issues? No   Did the patient receive a copy of their discharge instructions? Yes   Nursing interventions Reviewed instructions with patient   What is the patient's perception of their health status since discharge? Improving   Is the patient/caregiver able to teach back signs and symptoms related to disease process for when to call PCP? Yes   Is the patient/caregiver able to teach back signs and symptoms related to disease process for when to call 911? Yes   Is the patient/caregiver able to teach back the hierarchy of who to call/visit for symptoms/problems? PCP, Specialist, Home  health nurse, Urgent Care, ED, 911 Yes   Additional teach back comments States She states she is feeling good and getting better every day.   Week 1 call completed? Yes   Wrap up additional comments If diarrhea does not improved she will contact her PCP   Call end time [id number]            Angie LEE - Licensed Nurse

## 2025-04-22 ENCOUNTER — PATIENT ROUNDING (BHMG ONLY) (OUTPATIENT)
Dept: ORTHOPEDIC SURGERY | Facility: CLINIC | Age: 85
End: 2025-04-22
Payer: MEDICARE

## 2025-04-24 NOTE — DISCHARGE SUMMARY
Date of Discharge:  4/24/2025    Discharge Diagnosis:     Multifocal atrial tachycardia/multiple PACs  Chronic hypoxic respiratory failure  Supplemental O2 dependency  Hematochezia  RLD  AS  Acquired hypothyroidism  MVI  HH  GERD with esophagitis  Raynaud's disease  Dyslipidemia  CKDII  HTN with CKDII  MDE, moderate recurrent  Physical deconditioning    Presenting Problem/History of Present Illness  Active Hospital Problems    Diagnosis  POA    **Generalized weakness [R53.1]  Yes    Premature atrial complexes [I49.1]  Yes     Priority: High    Palpitations [R00.2]  Yes     Priority: High    Atrial tachycardia [I47.19]  Yes     Priority: High    Hematochezia [K92.1]  Yes     Priority: High    Dependence on supplemental oxygen [Z99.81]  Not Applicable     Priority: High    Chronic respiratory failure with hypoxia [J96.11]  Yes     Priority: High    Restrictive lung disease [J98.4]  Yes     Priority: High    Hiatal hernia [K44.9]  Yes     Priority: High    Pulmonary hypertension [I27.20]  Yes     Priority: High    Restrictive lung disease [J98.4]  Yes    Moderate protein-calorie malnutrition [E44.0]  Yes    Positive D dimer [R79.89]  Yes    New onset a-fib [I48.91]  Yes    CLAY (generalized anxiety disorder) [F41.1]  Yes    Raynaud's disease [I73.00]  Yes    Hypothyroidism [E03.9]  Yes    Hyperlipidemia [E78.5]  Yes    Hypertensive disorder [I10]  Yes    Aortic stenosis [I35.0]  Yes    Shortness of breath [R06.02]  Yes    Mitral valve insufficiency [I34.0]  Yes      Resolved Hospital Problems   No resolved problems to display.          Hospital Course  Patient is a 84 y.o. female who presented with palpitations and weakness with evidence of atrial tachycardia with multiple PACs.  She responded well to rate control agents and was observed under the care of cardiology.  She did well and was deemed stable for discharge home with medications per the discharge reconciliation.  Close outpatient follow-up with us in the office  within 1 week and with cardiology within 2 to 4 weeks time.  Call with any decompensation    Procedures Performed         Consults:   Consults       Date and Time Order Name Status Description    4/16/2025 11:07 AM Inpatient Gastroenterology Consult Completed             Pertinent Test Results:No radiology results for the last 7 days    Imaging Results (Last 7 Days)       Procedure Component Value Units Date/Time    CT Angiogram Chest Pulmonary Embolism [[id number]] Collected: 04/14/25 2357     Updated: 04/15/25 0001    Narrative:      CT ANGIOGRAM CHEST PULMONARY EMBOLISM    Date of Exam: 4/14/2025 11:55 PM EDT    Indication: elevated d. dimer.  in new afib..    Comparison: 4/14/2025, 3/14/2024.    Technique: Axial CT images were obtained of the chest after the uneventful intravenous administration of iodinated contrast utilizing pulmonary embolism protocol.  In addition, a 3-D volume rendered image was created for interpretation.  Sagittal and   coronal reconstructions were performed.  Automated exposure control and iterative reconstruction methods were used.      Findings:    Pulmonary arteries: Adequate opacification of the pulmonary arteries. No evidence of acute pulmonary embolism.    Lungs and Pleura: The lungs are clear. No nodule. No consolidation. No pleural fluid. Redemonstration of a large hiatal hernia with the majority of the stomach seen above the left hemidiaphragm. The esophagus appears patulous and contains fluid, similar   as compared to the previous study.    Mediastinum/[place]: No mediastinal or hilar lymphadenopathy.    Lymph nodes: No axillary or supraclavicular adenopathy.    Cardiovascular: The heart appears enlarged, similar as compared to the previous study.. The pericardium is normal. The aorta and its arch branch vessels are unremarkable.       Upper Abdomen: The upper abdominal contents are unremarkable.          Bones and Soft Tissue: No suspicious osseous lesion.        Impression:       Impression:  1.No evidence of pulmonary embolism. No acute cardiopulmonary process.  2.Stable cardiomegaly.  3.Stable large hiatal hernia.  4.Ancillary findings as described above.            Electronically Signed: Laura Sales MD    4/14/2025 11:59 PM EDT    Workstation ID: [id number]    XR Chest 1 View [[id number]] Collected: 04/14/25 1935     Updated: 04/14/25 1938    Narrative:      XR CHEST 1 VW    Date of Exam: 4/14/2025 6:40 PM EDT    Indication: New Admission    Comparison: None available.    Findings: Large hiatal hernia again noted. No consolidation. No pneumothorax or pleural effusion. The clavicles are intact. Atheromatous disease of the aortic arch. No rib fractures.      Impression:      No acute cardiopulmonary disease.      Electronically Signed: Ethan Carvajal MD    4/14/2025 7:36 PM EDT    Workstation ID: [id number]                Condition on Discharge:  Fair    Vital Signs       Physical Exam:     General Appearance:    Alert, cooperative, in no acute distress   Head:    Normocephalic, without obvious abnormality, atraumatic   Eyes:           Conjunctivae and sclerae normal, no   icterus, no pallor, corneas clear, PERRLA   Throat:   No oral lesions, no thrush, oral mucosa moist   Neck:   No adenopathy, supple, trachea midline, no thyromegaly, no   carotid bruit, no JVD   Lungs:     Clear to auscultation,respirations regular, even and                  unlabored    Heart:    Regular rhythm and normal rate, normal S1 and S2, no            murmur, no gallop, no rub, no click   Chest Wall:    No abnormalities observed   Abdomen:     Normal bowel sounds, no masses, no organomegaly, soft        non-tender, non-distended, no guarding, no rebound                tenderness   Rectal:     Deferred   Extremities:   Moves all extremities well, no edema, no cyanosis, no             redness   Pulses:   Pulses palpable and equal bilaterally   Skin:   No bleeding, bruising or rash   Lymph nodes:   No palpable adenopathy    Neurologic:   Cranial nerves 2 - 12 grossly intact, sensation intact, DTR       present and equal bilaterally         Discharge Disposition  Home-Health Care [facility]    Discharge Medications     Discharge Medications        New Medications        Instructions Start Date   metoprolol succinate XL 25 MG 24 hr tablet  Commonly known as: TOPROL-XL   12.5 mg, Oral, Every 24 Hours Scheduled             Continue These Medications        Instructions Start Date   Acetaminophen 500 MG capsule   500 mg, Every 6 Hours PRN      albuterol sulfate  (90 Base) MCG/ACT inhaler  Commonly known as: PROVENTIL HFA;VENTOLIN HFA;PROAIR HFA   2 puffs, Every 4 Hours PRN      B-12 1000 MCG capsule   1 capsule, Daily      bimatoprost 0.01 % ophthalmic drops  Commonly known as: LUMIGAN   1 drop, Nightly      brimonidine 0.2 % ophthalmic solution  Commonly known as: ALPHAGAN   Administer 1 drop to the right eye 3 (Three) Times a Day.      budesonide 0.5 MG/2ML nebulizer solution  Commonly known as: PULMICORT   0.5 mg, 2 Times Daily PRN      Cranberry 450 MG tablet   1 tablet, Daily      escitalopram 20 MG tablet  Commonly known as: LEXAPRO   20 mg, Daily      O2  Commonly known as: OXYGEN   3 L/min, Continuous      omeprazole 40 MG capsule  Commonly known as: priLOSEC   40 mg, Daily      ondansetron ODT 4 MG disintegrating tablet  Commonly known as: ZOFRAN-ODT   4 mg, Translingual, Every 8 Hours PRN      revefenacin 175 MCG/3ML nebulizer solution  Commonly known as: YUPELRI   175 mcg, Daily - RT      Synthroid 75 MCG tablet  Generic drug: levothyroxine   75 mcg, Daily             Stop These Medications      formoterol 20 MCG/2ML nebulizer solution  Commonly known as: PERFOROMIST     losartan 50 MG tablet  Commonly known as: COZAAR     multivitamin with minerals tablet tablet              Discharge Diet:   Cardiac  Activity at Discharge:   As tolerated  Follow-up Appointments  Future Appointments   Date Time Provider Department Center    4/28/2025  1:30 PM Jania Layne APRN [facility]   6/4/2025  2:15 PM Brian Malone MD [facility]   6/17/2025 10:15 AM Raúl Nolan MD [facility] None   9/30/2025 10:00 AM Yajaira Cisneros MD [facility]     Additional Instructions for the Follow-ups that You Need to Schedule       Ambulatory Referral to Home Health   As directed      Face to Face Visit Date: 4/15/2025   Follow-up provider for Plan of Care?: I will be treating the patient on an ongoing basis.  Please send me the Plan of Care for signature.   Follow-up provider: EBEN WYNN [[id number]]   Reason/Clinical Findings: Post-hospital evaluation   Describe mobility limitations that make leaving home difficult: Tires easily   Nursing/Therapeutic Services Requested: Skilled Nursing (HH to eval and treat) Physical Therapy   Skilled nursing orders: Cardiopulmonary assessments (HH to eval and treat) Other   PT orders: Home safety assessment (HH to eval and treat) Strengthening Other (add comment)   Frequency: 1 Week 1                Test Results Pending at Discharge  Pending Results       None             Eben Wynn MD  04/24/25  08:54 EDT

## 2025-04-28 ENCOUNTER — OFFICE VISIT (OUTPATIENT)
Dept: CARDIOLOGY | Facility: CLINIC | Age: 85
End: 2025-04-28
Payer: MEDICARE

## 2025-04-28 VITALS
BODY MASS INDEX: 20.76 KG/M2 | SYSTOLIC BLOOD PRESSURE: 134 MMHG | HEIGHT: 59 IN | HEART RATE: 75 BPM | DIASTOLIC BLOOD PRESSURE: 75 MMHG | WEIGHT: 103 LBS

## 2025-04-28 DIAGNOSIS — I47.19 ATRIAL TACHYCARDIA: ICD-10-CM

## 2025-04-28 DIAGNOSIS — Z09 HOSPITAL DISCHARGE FOLLOW-UP: Primary | ICD-10-CM

## 2025-04-28 DIAGNOSIS — I10 HYPERTENSION, UNSPECIFIED TYPE: ICD-10-CM

## 2025-04-28 PROCEDURE — 3078F DIAST BP <80 MM HG: CPT | Performed by: NURSE PRACTITIONER

## 2025-04-28 PROCEDURE — 99214 OFFICE O/P EST MOD 30 MIN: CPT | Performed by: NURSE PRACTITIONER

## 2025-04-28 PROCEDURE — 3075F SYST BP GE 130 - 139MM HG: CPT | Performed by: NURSE PRACTITIONER

## 2025-04-28 RX ORDER — LOSARTAN POTASSIUM 50 MG/1
50 TABLET ORAL DAILY
Start: 2025-04-28

## 2025-04-30 ENCOUNTER — READMISSION MANAGEMENT (OUTPATIENT)
Dept: CALL CENTER | Facility: HOSPITAL | Age: 85
End: 2025-04-30
Payer: MEDICARE

## 2025-04-30 NOTE — OUTREACH NOTE
Medical Week 2 Survey      Flowsheet Row Responses   [facility] facility patient discharged from? [facility]   Does the patient have one of the following disease processes/diagnoses(primary or secondary)? Other   Week 2 attempt successful? No   Unsuccessful attempts Attempt 1            Aida Valenzuela Registered Nurse

## 2025-05-05 ENCOUNTER — READMISSION MANAGEMENT (OUTPATIENT)
Dept: CALL CENTER | Facility: HOSPITAL | Age: 85
End: 2025-05-05
Payer: MEDICARE

## 2025-05-05 NOTE — OUTREACH NOTE
Medical Week 2 Survey      Flowsheet Row Responses   [facility] patient discharged from? [facility]   Does the patient have one of the following disease processes/diagnoses(primary or secondary)? Other   Week 2 attempt successful? No   Unsuccessful attempts Attempt 2   Revoke Morelia Valenzuela Registered Nurse

## 2025-06-04 ENCOUNTER — OFFICE VISIT (OUTPATIENT)
Dept: CARDIOLOGY | Facility: CLINIC | Age: 85
End: 2025-06-04
Payer: MEDICARE

## 2025-06-04 VITALS
WEIGHT: 96 LBS | HEIGHT: 59 IN | BODY MASS INDEX: 19.35 KG/M2 | SYSTOLIC BLOOD PRESSURE: 121 MMHG | DIASTOLIC BLOOD PRESSURE: 79 MMHG

## 2025-06-04 DIAGNOSIS — I47.19 ATRIAL TACHYCARDIA: ICD-10-CM

## 2025-06-04 DIAGNOSIS — R06.02 SHORTNESS OF BREATH: ICD-10-CM

## 2025-06-04 DIAGNOSIS — I10 HYPERTENSION, UNSPECIFIED TYPE: ICD-10-CM

## 2025-06-04 DIAGNOSIS — E03.9 HYPOTHYROIDISM (ACQUIRED): ICD-10-CM

## 2025-06-04 DIAGNOSIS — I27.20 PULMONARY HYPERTENSION: ICD-10-CM

## 2025-06-04 DIAGNOSIS — E78.2 MIXED HYPERLIPIDEMIA: Primary | ICD-10-CM

## 2025-06-04 DIAGNOSIS — I35.0 NONRHEUMATIC AORTIC VALVE STENOSIS: ICD-10-CM

## 2025-06-04 DIAGNOSIS — E03.9 ACQUIRED HYPOTHYROIDISM: ICD-10-CM

## 2025-06-04 RX ORDER — AZITHROMYCIN 250 MG/1
TABLET, FILM COATED ORAL
COMMUNITY
Start: 2025-05-23

## 2025-06-04 RX ORDER — FORMOTEROL FUMARATE 20 UG/2ML
SOLUTION RESPIRATORY (INHALATION)
COMMUNITY

## 2025-06-04 NOTE — PROGRESS NOTES
Encounter Date:06/04/2025  Last seen 4/16/2025.      Patient ID: Anitra Nobles is a 84 y.o. female.    Chief complaint  Shortness of breath  Mitral regurgitation  Moderate aortic valve stenosis  Large hiatal hernia  Pulmonary hypertension    History of present illness  Patient recently was in the hospital with rectal bleed-improved and was discharged home.  No further rectal bleed.    Since I have last seen, the patient has been without any chest discomfort ,shortness of breath, palpitations, dizziness or syncope.  Denies having any headache ,abdominal pain ,nausea, vomiting , diarrhea constipation, loss of weight or loss of appetite.  Denies having any excessive bruising ,hematuria or blood in the stool.    Review of all systems negative except as indicated.    Reviewed ROS.    ////////////////////////////  History  ====================   -Shortness of breath and chest discomfort     - history of 2 to 3+ mitral regurgitation.     - Moderate aortic valve stenosis with gradient of 22/12 mmHg and valve area of 1.6 cm².  However at cardiac cath no gradient was noted across the aortic valve.     - Large hiatal hernia (see recent endoscopy)     Cardiac cath 6/30/2023  Pulmonary hypertension (pulmonary artery pressure 56/22 and mean pulmonary artery pressure 34.  Left ventricular size and contractility is normal with ejection fraction of 60%.  Significant mitral regurgitation is present.  No gradient was noted across the aortic valve.  Normal epicardial coronary arteries.     Lexiscan Cardiolite test-normal-5/1/2023     Echocardiogram 5/1/2023 revealed  Left atrial enlargement  Mitral annular calcification  Moderate aortic valve stenosis with gradient of 22/12 mmHg and valve area of 1.6 cm².  Normal left ventricular function.     Attempted QAMAR 6/30/2023-not successful due to large hiatal hernia and significant tortuosity of the esophagus.     Upper endoscopy results as below.  Dr. Helms  Lynn.-6/30/2023  Impression:  1.  Esophagus was full of liquid and some semisolid food and was massively dilated with severe ulcerations and signs of stasis throughout the entire esophagus.  At 25 cm from the incisors, there is a very sharp turn at 90 degrees followed by another 90 degree turn indicating a very tortuous esophagus.  There is no stricture in the esophagus was widely patent into the stomach at 30 cm.  2.  Very large hiatal hernia with half of the stomach and the patient's chest.  There was food sitting in the hiatal hernia.  It was a very tight narrowing entering the distal stomach through the diaphragmatic hiatus which is likely why the hiatal hernia is pocketed with food  3.  Some larger gastric polyps with the appearance of hyperplastic polyps in the fundus were present and some in the hiatal hernia itself  4.  Normal duodenal mucosa visualized to D3     Recommendations:  Given the patient's shortness of breath and early satiety with changes of her esophagus, I would recommend surgery for her large hiatal hernia.  This will likely improve her respiratory status and her early satiety.  P.o. twice daily PPI  Follow-up in GI clinic     Seen by Dr. Huerta.  Did not think patient would be a surgical candidate for hiatal hernia.     -anemia     -anxiety depression     -History of esophageal stricture.     - status post partial thyroidectomy ganglionectomy and appendectomy     -former smoker     -hypothyroidism     -family history of coronary artery disease     - history of Raynaud's phenomena  ====================   Plan  ================  Patient recently was in the hospital with rectal bleed.  No further episodes since discharge.    Shortness of breath-better.  Patient is on home oxygen.  In the past, patient was seen by pulmonologist.    Patient is undergoing pulmonary rehabilitation at [facility].     Mitral regurgitation-stable.    Moderate aortic valve stenosis.  No gradient  was noted across the aortic valve at cardiac catheterization.     Pulmonary hypertension probably secondary to mitral regurgitation.      Patient had attempted QAMAR prior to cardiac catheterization however patient has significant problems with dysphagia and GI performed upper endoscopy that revealed severe tortuosity of the esophagus achalasia and severe hiatal hernia with half of the stomach in the chest cavity.  QAMAR was not performed.     Patient's symptoms could be from large hiatal hernia in combination with mitral regurgitation which appears to be significant on left ventricular angiogram although mild on transthoracic echocardiogram.  Performing QAMAR would be extremely difficult and risky due to upper endoscopy findings.     Patient has significant pulmonary hypertension likely from mitral regurgitation.     GI follow-up with Dr. Scooter Bailey or Dr. Huerta regarding large hiatal hernia and to discuss surgical options.  Apparently Dr. Huerta did not think patient would be a surgical candidate.     Hypothyroidism-on levothyroxine.    Medications were reviewed and updated.  Patient is on subcu Lovenox (DVT prophylaxis) Lexapro metoprolol XL 12.5 mg twice daily pantoprazole.     History of shortness of breath-multifactorial including pulmonary hypertension mitral regurgitation hiatal hernia significant kyphoscoliosis.     Follow-up in the office in 6 months.    Further plan will depend on patient's progress.    Reviewed and updated 6/4/2025.  //////////////////////////////////////////          Diagnosis Plan   1. Mixed hyperlipidemia        2. Shortness of breath        3. Atrial tachycardia        4. Pulmonary hypertension        5. Acquired hypothyroidism        6. Hypertension, unspecified type        7. Nonrheumatic aortic valve stenosis        8. Hypothyroidism (acquired)        LAB RESULTS (LAST 7 DAYS)    CBC        BMP        CMP         BNP        TROPONIN        CoAg        Creatinine Clearance  CrCl  cannot be calculated (Patient's most recent lab result is older than the maximum 30 days allowed.).    ABG        Radiology  No radiology results for the last day                The following portions of the patient's history were reviewed and updated as appropriate: allergies, current medications, past family history, past medical history, past social history, past surgical history, and problem list.    Review of Systems   Constitutional: Positive for malaise/fatigue.   Cardiovascular:  Negative for chest pain, dyspnea on exertion, leg swelling and palpitations.   Respiratory:  Positive for shortness of breath. Negative for cough.    Gastrointestinal:  Negative for abdominal pain, nausea and vomiting.   Neurological:  Positive for weakness. Negative for dizziness, headaches, light-headedness and numbness.   All other systems reviewed and are negative.        Current Outpatient Medications:     Acetaminophen 500 MG capsule, Take 1 capsule by mouth Every 6 (Six) Hours As Needed., Disp: , Rfl:     albuterol sulfate  (90 Base) MCG/ACT inhaler, Inhale 2 puffs Every 4 (Four) Hours As Needed., Disp: , Rfl:     azithromycin (ZITHROMAX) 250 MG tablet, , Disp: , Rfl:     bimatoprost (LUMIGAN) 0.01 % ophthalmic drops, Administer 1 drop to both eyes Every Night., Disp: , Rfl:     brimonidine (ALPHAGAN) 0.2 % ophthalmic solution, Administer 1 drop to the right eye 3 (Three) Times a Day., Disp: , Rfl:     budesonide (PULMICORT) 0.5 MG/2ML nebulizer solution, Take 2 mL by nebulization 2 (Two) Times a Day As Needed., Disp: , Rfl:     Cranberry 450 MG tablet, Take 1 tablet by mouth Daily., Disp: , Rfl:     Cyanocobalamin (B-12) 1000 MCG capsule, Take 1 capsule by mouth Daily., Disp: , Rfl:     escitalopram (LEXAPRO) 20 MG tablet, Take 1 tablet by mouth Daily., Disp: , Rfl:     formoterol (Perforomist) 20 MCG/2ML nebulizer solution, , Disp: , Rfl:     losartan (Cozaar) 50 MG tablet, Take 1 tablet by mouth Daily., Disp: , Rfl:      metoprolol succinate XL (TOPROL-XL) 25 MG 24 hr tablet, Take 0.5 tablets by mouth Daily., Disp: 15 tablet, Rfl: 3    O2 (OXYGEN), Inhale 3 L/min Continuous., Disp: , Rfl:     omeprazole (priLOSEC) 40 MG capsule, Take 1 capsule by mouth Daily., Disp: , Rfl:     ondansetron ODT (ZOFRAN-ODT) 4 MG disintegrating tablet, Place 1 tablet on the tongue Every 8 (Eight) Hours As Needed for Vomiting or Nausea., Disp: 20 tablet, Rfl: 0    revefenacin (YUPELRI) 175 MCG/3ML nebulizer solution, Take 3 mL by nebulization Daily., Disp: , Rfl:     SYNTHROID 75 MCG tablet, Take 1 tablet by mouth Daily., Disp: , Rfl:     No Known Allergies    Family History   Problem Relation Age of Onset    No Known Problems Mother     Heart disease Father     Ovarian cancer Sister     Colon polyps Sister     Colon cancer Brother     Colon polyps Brother        Past Surgical History:   Procedure Laterality Date    APPENDECTOMY N/A 1985    Dr. Adame    BLADDER REPAIR  04/2019    BREAST BIOPSY Left 2012    BENIGN    CARDIAC CATHETERIZATION N/A 6/30/2023    Procedure: Right and Left Heart Cath with Coronar Angiography;  Surgeon: Brian Malone MD;  Location: [facility] CATH INVASIVE LOCATION;  Service: Cardiovascular;  Laterality: N/A;    CATARACT EXTRACTION  2008    Dr. Shaffer    COLONOSCOPY N/A 2014    ENDOSCOPY N/A 6/30/2023    Procedure: ESOPHAGOGASTRODUODENOSCOPY with Dilation;  Surgeon: Scooter Bailey MD;  Location: [facility] ENDOSCOPY;  Service: Gastroenterology;  Laterality: N/A;  Post- Hiatal hernia, achalasia, food in esophagus    RECTAL PROLAPSE REPAIR  04/2019    THYROIDECTOMY, PARTIAL  2002    UPPER GASTROINTESTINAL ENDOSCOPY N/A 2017    Dr. Huerta       Past Medical History:   Diagnosis Date    Anemia     Anxiety     Arthritis     Depression     GERD (gastroesophageal reflux disease)     Hypertension     Pulmonary hypertension 3/19/2018    Raynauds disease     Shortness of breath 7/20/2016    Thyroid disorder        Family History   Problem  "Relation Age of Onset    No Known Problems Mother     Heart disease Father     Ovarian cancer Sister     Colon polyps Sister     Colon cancer Brother     Colon polyps Brother        Social History     Socioeconomic History    Marital status:    Tobacco Use    Smoking status: Former     Current packs/day: 0.00     Types: Cigarettes     Quit date:      Years since quittin.4     Passive exposure: Past    Smokeless tobacco: Never   Vaping Use    Vaping status: Never Used   Substance and Sexual Activity    Alcohol use: Yes     Alcohol/week: 7.0 standard drinks of alcohol     Types: 7 Glasses of wine per week    Drug use: Never    Sexual activity: Defer         Procedures      Objective:       Physical Exam    /79 (BP Location: Right arm, Patient Position: Sitting, Cuff Size: Adult)   Ht 149.9 cm (59\")   Wt 43.5 kg (96 lb)   BMI 19.39 kg/m²   The patient is alert, oriented and in no distress.    Vital signs as noted above.    Head and neck revealed no carotid bruits or jugular venous distension.  No thyromegaly or lymphadenopathy is present.    Lungs clear.  No wheezing.  Breath sounds are normal bilaterally.    Heart normal first and second heart sounds.  No murmur..  No pericardial rub is present.  No gallop is present.    Abdomen soft and nontender.  No organomegaly is present.    Extremities revealed good peripheral pulses without any pedal edema.    Skin warm and dry.    Musculoskeletal system is grossly normal.    CNS grossly normal.    Reviewed and updated        "